# Patient Record
Sex: FEMALE | Race: BLACK OR AFRICAN AMERICAN | Employment: OTHER | URBAN - METROPOLITAN AREA
[De-identification: names, ages, dates, MRNs, and addresses within clinical notes are randomized per-mention and may not be internally consistent; named-entity substitution may affect disease eponyms.]

---

## 2017-02-22 ENCOUNTER — HOSPITAL ENCOUNTER (EMERGENCY)
Age: 72
Discharge: HOME | End: 2017-02-22
Attending: EMERGENCY MEDICINE
Payer: MEDICARE

## 2017-02-22 ENCOUNTER — APPOINTMENT (OUTPATIENT)
Dept: CT IMAGING | Age: 72
End: 2017-02-22
Attending: EMERGENCY MEDICINE
Payer: MEDICARE

## 2017-02-22 VITALS
HEIGHT: 60 IN | SYSTOLIC BLOOD PRESSURE: 145 MMHG | TEMPERATURE: 98.3 F | HEART RATE: 72 BPM | WEIGHT: 130 LBS | RESPIRATION RATE: 16 BRPM | OXYGEN SATURATION: 99 % | DIASTOLIC BLOOD PRESSURE: 85 MMHG | BODY MASS INDEX: 25.52 KG/M2

## 2017-02-22 DIAGNOSIS — R56.9 SEIZURE (HCC): Primary | ICD-10-CM

## 2017-02-22 LAB
ALBUMIN SERPL BCP-MCNC: 3.8 G/DL (ref 3.5–5)
ALBUMIN/GLOB SERPL: 0.8 {RATIO} (ref 1.1–2.2)
ALP SERPL-CCNC: 94 U/L (ref 45–117)
ALT SERPL-CCNC: 33 U/L (ref 12–78)
ANION GAP BLD CALC-SCNC: 9 MMOL/L (ref 5–15)
APPEARANCE UR: CLEAR
AST SERPL W P-5'-P-CCNC: 22 U/L (ref 15–37)
ATRIAL RATE: 101 BPM
BACTERIA URNS QL MICRO: NEGATIVE /HPF
BASOPHILS # BLD AUTO: 0 K/UL (ref 0–0.1)
BASOPHILS # BLD: 0 % (ref 0–1)
BILIRUB SERPL-MCNC: 0.8 MG/DL (ref 0.2–1)
BILIRUB UR QL: NEGATIVE
BUN SERPL-MCNC: 17 MG/DL (ref 6–20)
BUN/CREAT SERPL: 20 (ref 12–20)
CALCIUM SERPL-MCNC: 9.5 MG/DL (ref 8.5–10.1)
CALCULATED P AXIS, ECG09: 38 DEGREES
CALCULATED R AXIS, ECG10: -7 DEGREES
CALCULATED T AXIS, ECG11: 33 DEGREES
CHLORIDE SERPL-SCNC: 105 MMOL/L (ref 97–108)
CO2 SERPL-SCNC: 26 MMOL/L (ref 21–32)
COLOR UR: ABNORMAL
CREAT SERPL-MCNC: 0.84 MG/DL (ref 0.55–1.02)
DIAGNOSIS, 93000: NORMAL
EOSINOPHIL # BLD: 0 K/UL (ref 0–0.4)
EOSINOPHIL NFR BLD: 0 % (ref 0–7)
EPITH CASTS URNS QL MICRO: ABNORMAL /LPF
ERYTHROCYTE [DISTWIDTH] IN BLOOD BY AUTOMATED COUNT: 13.9 % (ref 11.5–14.5)
GLOBULIN SER CALC-MCNC: 4.6 G/DL (ref 2–4)
GLUCOSE SERPL-MCNC: 97 MG/DL (ref 65–100)
GLUCOSE UR STRIP.AUTO-MCNC: NEGATIVE MG/DL
HCT VFR BLD AUTO: 38.3 % (ref 35–47)
HGB BLD-MCNC: 12.5 G/DL (ref 11.5–16)
HGB UR QL STRIP: NEGATIVE
KETONES UR QL STRIP.AUTO: NEGATIVE MG/DL
LEUKOCYTE ESTERASE UR QL STRIP.AUTO: NEGATIVE
LYMPHOCYTES # BLD AUTO: 49 % (ref 12–49)
LYMPHOCYTES # BLD: 2.8 K/UL (ref 0.8–3.5)
MCH RBC QN AUTO: 27.5 PG (ref 26–34)
MCHC RBC AUTO-ENTMCNC: 32.6 G/DL (ref 30–36.5)
MCV RBC AUTO: 84.4 FL (ref 80–99)
MONOCYTES # BLD: 0.4 K/UL (ref 0–1)
MONOCYTES NFR BLD AUTO: 6 % (ref 5–13)
NEUTS SEG # BLD: 2.6 K/UL (ref 1.8–8)
NEUTS SEG NFR BLD AUTO: 45 % (ref 32–75)
NITRITE UR QL STRIP.AUTO: NEGATIVE
P-R INTERVAL, ECG05: 144 MS
PH UR STRIP: 6 [PH] (ref 5–8)
PLATELET # BLD AUTO: 184 K/UL (ref 150–400)
POTASSIUM SERPL-SCNC: 3.6 MMOL/L (ref 3.5–5.1)
PROT SERPL-MCNC: 8.4 G/DL (ref 6.4–8.2)
PROT UR STRIP-MCNC: ABNORMAL MG/DL
Q-T INTERVAL, ECG07: 344 MS
QRS DURATION, ECG06: 66 MS
QTC CALCULATION (BEZET), ECG08: 446 MS
RBC # BLD AUTO: 4.54 M/UL (ref 3.8–5.2)
RBC #/AREA URNS HPF: ABNORMAL /HPF (ref 0–5)
SODIUM SERPL-SCNC: 140 MMOL/L (ref 136–145)
SP GR UR REFRACTOMETRY: 1.02 (ref 1–1.03)
UA: UC IF INDICATED,UAUC: ABNORMAL
UROBILINOGEN UR QL STRIP.AUTO: 1 EU/DL (ref 0.2–1)
VENTRICULAR RATE, ECG03: 101 BPM
WBC # BLD AUTO: 5.8 K/UL (ref 3.6–11)
WBC URNS QL MICRO: ABNORMAL /HPF (ref 0–4)

## 2017-02-22 PROCEDURE — 93005 ELECTROCARDIOGRAM TRACING: CPT

## 2017-02-22 PROCEDURE — 81001 URINALYSIS AUTO W/SCOPE: CPT | Performed by: EMERGENCY MEDICINE

## 2017-02-22 PROCEDURE — 74011250636 HC RX REV CODE- 250/636: Performed by: EMERGENCY MEDICINE

## 2017-02-22 PROCEDURE — 80177 DRUG SCRN QUAN LEVETIRACETAM: CPT | Performed by: EMERGENCY MEDICINE

## 2017-02-22 PROCEDURE — 36415 COLL VENOUS BLD VENIPUNCTURE: CPT | Performed by: EMERGENCY MEDICINE

## 2017-02-22 PROCEDURE — 85025 COMPLETE CBC W/AUTO DIFF WBC: CPT | Performed by: EMERGENCY MEDICINE

## 2017-02-22 PROCEDURE — 99285 EMERGENCY DEPT VISIT HI MDM: CPT

## 2017-02-22 PROCEDURE — 80053 COMPREHEN METABOLIC PANEL: CPT | Performed by: EMERGENCY MEDICINE

## 2017-02-22 PROCEDURE — 96365 THER/PROPH/DIAG IV INF INIT: CPT

## 2017-02-22 PROCEDURE — 74011000258 HC RX REV CODE- 258: Performed by: EMERGENCY MEDICINE

## 2017-02-22 PROCEDURE — 70450 CT HEAD/BRAIN W/O DYE: CPT

## 2017-02-22 RX ORDER — ACETAMINOPHEN 160 MG/5ML
640 LIQUID ORAL
COMMUNITY
End: 2020-02-04

## 2017-02-22 RX ORDER — DOCUSATE SODIUM 50 MG/5ML
100 LIQUID ORAL 2 TIMES DAILY
COMMUNITY
End: 2020-01-23

## 2017-02-22 RX ORDER — LORATADINE 10 MG/1
10 TABLET ORAL
COMMUNITY

## 2017-02-22 RX ORDER — ASPIRIN 325 MG
2 TABLET, DELAYED RELEASE (ENTERIC COATED) ORAL
COMMUNITY
End: 2020-01-23

## 2017-02-22 RX ORDER — AMLODIPINE BESYLATE 2.5 MG/1
5 TABLET ORAL DAILY
COMMUNITY
End: 2020-02-04

## 2017-02-22 RX ORDER — LEVETIRACETAM 500 MG/1
1000 TABLET, EXTENDED RELEASE ORAL DAILY
COMMUNITY
End: 2020-01-23

## 2017-02-22 RX ADMIN — SODIUM CHLORIDE 1000 MG: 900 INJECTION, SOLUTION INTRAVENOUS at 09:38

## 2017-02-22 RX ADMIN — SODIUM CHLORIDE 1000 ML: 900 INJECTION, SOLUTION INTRAVENOUS at 09:38

## 2017-02-22 NOTE — ED NOTES
Discharge instructions given to [facility] representative. V/S stable @ time of discharge. Pt wheeled out of unit by [facility] representative.

## 2017-02-22 NOTE — ED TRIAGE NOTES
Pt from [facility] as respit pt. Pt has known epilepsy, had a grand mal seizure ~ 5 minutes, pt was getting dressed for the day. Pt was lying in the bed, did not fall, no evidence of aspiration. Pt has \"profound intellectual disability\" at baseline, can only speak a few words, significant gait imbalances and is wheelchair bound. .

## 2017-02-22 NOTE — PROGRESS NOTES
Noted TRST score. EMR reviewed. History significant for seizures, HTN, and neurological disorder. Patient presents [facility] s[facility]p seizure. Patient is w/ [facility] and placed for respite stay at facility. Noted contact name/number for [facility]. CM attempted to assess patient however nonverbal.     Case discussed w/ Pedro Orts. Informed [place] to f/u w/ [facility]. CM placed call to SW - message left 1115 regarding ETA. Call received from [place], [address] informed of ETA by 1200. Noted 1215 alternative call placed back to [facility] regarding status of ride. Informed patient was to discharge from [place] today and go home -  may have stopped at facility to  belongings before patient. Contact made w/ ED Registration and  on site [id number]. Updates provided aKvitha Ta - informed [facility] is all inclusive care and would provide facilitation back into community w/ family. Care Management Interventions  Mode of Transport at Discharge: [address]  [address] Time of Discharge: [address] (CM Consult):  Other (TRST)  MyChart Signup: No  Discharge Durable Medical Equipment: No  Physical Therapy Consult: No  Occupational Therapy Consult: No  Speech Therapy Consult: No  Current Support Network: Nursing Facility (respite stay at [facility])  Confirm Follow Up Transport: Other (see comment) ([facility] transport)  Plan discussed with Pt/Family/Caregiver: No  Discharge Location  Discharge Placement: Home

## 2017-02-22 NOTE — DISCHARGE INSTRUCTIONS
We hope that we have addressed all of your medical concerns. The examination and treatment you received in the Emergency Department were for an emergent problem and were not intended as complete care. It is important that you follow up with your healthcare provider(s) for ongoing care. If your symptoms worsen or do not improve as expected, and you are unable to reach your usual health care provider(s), you should return to the Emergency Department. Today's healthcare is undergoing tremendous change, and patient satisfaction surveys are one of the many tools to assess the quality of medical care. You may receive a survey from the [employer] regarding your experience in the Emergency Department. I hope that your experience has been completely positive, particularly the medical care that I provided. As such, please participate in the survey; anything less than excellent does not meet my expectations or intentions. [facility] and [address] [place] [address] participate in nationally recognized quality of care measures. If your blood pressure is greater than 120/80, as reported below, we urge that you seek medical care to address the potential of high blood pressure, commonly known as hypertension. Hypertension can be hereditary or can be caused by certain medical conditions, pain, stress, or \"white coat syndrome. \"       Please make an appointment with your health care provider(s) for follow up of your Emergency Department visit. VITALS:   Patient Vitals for the past 8 hrs:   Temp Pulse Resp BP SpO2   02/22/17 1000 - 73 17 139/89 99 %   02/22/17 0903 98.2 °F (36.8 °C) (!) 102 16 130/82 98 %          Thank you for allowing us to provide you with medical care today. We realize that you have many choices for your emergency care needs. Please choose us in the future for any continued health care needs.       Regards,           Kevin Valle, MD    [address].   Office: [phone]            Recent Results (from the past 24 hour(s))   CBC WITH AUTOMATED DIFF    Collection Time: 02/22/17  9:10 AM   Result Value Ref Range    WBC 5.8 3.6 - 11.0 K/uL    RBC 4.54 3.80 - 5.20 M/uL    HGB 12.5 11.5 - 16.0 g/dL    HCT 38.3 35.0 - 47.0 %    MCV 84.4 80.0 - 99.0 FL    MCH 27.5 26.0 - 34.0 PG    MCHC 32.6 30.0 - 36.5 g/dL    RDW 13.9 11.5 - 14.5 %    PLATELET 837 928 - 018 K/uL    NEUTROPHILS 45 32 - 75 %    LYMPHOCYTES 49 12 - 49 %    MONOCYTES 6 5 - 13 %    EOSINOPHILS 0 0 - 7 %    BASOPHILS 0 0 - 1 %    ABS. NEUTROPHILS 2.6 1.8 - 8.0 K/UL    ABS. LYMPHOCYTES 2.8 0.8 - 3.5 K/UL    ABS. MONOCYTES 0.4 0.0 - 1.0 K/UL    ABS. EOSINOPHILS 0.0 0.0 - 0.4 K/UL    ABS. BASOPHILS 0.0 0.0 - 0.1 K/UL   METABOLIC PANEL, COMPREHENSIVE    Collection Time: 02/22/17  9:10 AM   Result Value Ref Range    Sodium 140 136 - 145 mmol/L    Potassium 3.6 3.5 - 5.1 mmol/L    Chloride 105 97 - 108 mmol/L    CO2 26 21 - 32 mmol/L    Anion gap 9 5 - 15 mmol/L    Glucose 97 65 - 100 mg/dL    BUN 17 6 - 20 MG/DL    Creatinine 0.84 0.55 - 1.02 MG/DL    BUN/Creatinine ratio 20 12 - 20      GFR est AA >60 >60 ml/min/1.73m2    GFR est non-AA >60 >60 ml/min/1.73m2    Calcium 9.5 8.5 - 10.1 MG/DL    Bilirubin, total 0.8 0.2 - 1.0 MG/DL    ALT (SGPT) 33 12 - 78 U/L    AST (SGOT) 22 15 - 37 U/L    Alk.  phosphatase 94 45 - 117 U/L    Protein, total 8.4 (H) 6.4 - 8.2 g/dL    Albumin 3.8 3.5 - 5.0 g/dL    Globulin 4.6 (H) 2.0 - 4.0 g/dL    A-G Ratio 0.8 (L) 1.1 - 2.2     URINALYSIS W/ REFLEX CULTURE    Collection Time: 02/22/17  9:20 AM   Result Value Ref Range    Color YELLOW/STRAW      Appearance CLEAR CLEAR      Specific gravity 1.024 1.003 - 1.030      pH (UA) 6.0 5.0 - 8.0      Protein TRACE (A) NEG mg/dL    Glucose NEGATIVE  NEG mg/dL    Ketone NEGATIVE  NEG mg/dL    Bilirubin NEGATIVE  NEG      Blood NEGATIVE  NEG      Urobilinogen 1.0 0.2 - 1.0 EU/dL    Nitrites NEGATIVE NEG      Leukocyte Esterase NEGATIVE  NEG      WBC 0-4 0 - 4 /hpf    RBC 0-5 0 - 5 /hpf    Epithelial cells MODERATE (A) FEW /lpf    Bacteria NEGATIVE  NEG /hpf    UA:UC IF INDICATED CULTURE NOT INDICATED BY UA RESULT CNI         Ct Head Wo Cont    Result Date: 2/22/2017  HEAD CT WITHOUT CONTRAST: 2/22/2017 9:33 AM INDICATION: seizure pt unable to answer questions. HISTORY (per electronic medical record): Received from skilled nursing facility after a grand mal seizure lasting 5 minutes. No fall; lying in bed. Patient has known epilepsy. Profound intellectual disability at baseline, only able to speak a few words. Wheelchair-bound. COMPARISON: None. PROCEDURE: Axial images of the head were obtained without contrast. Coronal and sagittal reformats were performed. CT dose reduction was achieved through use of a standardized protocol tailored for this examination and automatic exposure control for dose modulation. Adaptive statistical iterative reconstruction (ASIR) was utilized. FINDINGS: The ventricles and sulci are appropriate in size and configuration for age. No loss of gray-white differentiation to suggest late acute or early subacute infarction. No mass effect or intracranial hemorrhage. IMPRESSION: No acute intracranial abnormality. Seizure: Care Instructions  Your Care Instructions    Seizures are caused by abnormal patterns of electrical signals in the brain. They are different for each person. Seizures can affect movement, speech, vision, or awareness. Some people have only slight shaking of a hand and do not pass out. Other people may pass out and have violent shaking of the whole body. Some people appear to stare into space. They are awake, but they can't respond normally. Later, they may not remember what happened. You may need tests to identify the type and cause of the seizures. A seizure may occur only once, or you may have them more than one time.  Taking medicines as directed and following up with your doctor may help keep you from having more seizures. The doctor has checked you carefully, but problems can develop later. If you notice any problems or new symptoms, get medical treatment right away. Follow-up care is a key part of your treatment and safety. Be sure to make and go to all appointments, and call your doctor if you are having problems. It's also a good idea to know your test results and keep a list of the medicines you take. How can you care for yourself at home? · Be safe with medicines. Take your medicines exactly as prescribed. Call your doctor if you think you are having a problem with your medicine. · Do not do any activity that could be dangerous to you or others until your doctor says it is safe to do so. For example, do not drive a car, operate machinery, swim, or climb ladders. · Be sure that anyone treating you for any health problem knows that you have had a seizure and what medicines you are taking for it. · Identify and avoid things that may make you more likely to have a seizure. These may include lack of sleep, alcohol or drug use, stress, or not eating. · Make sure you go to your follow-up appointment. When should you call for help? Call 911 anytime you think you may need emergency care. For example, call if:  · You have another seizure. · You have more than one seizure in 24 hours. · You have new symptoms, such as trouble walking, speaking, or thinking clearly. Call your doctor now or seek immediate medical care if:  · You are not acting normally. Watch closely for changes in your health, and be sure to contact your doctor if you have any problems. Where can you learn more? Go to http://maryam-heber.info/. Enter S798 in the search box to learn more about \"Seizure: Care Instructions. \"  Current as of: February 19, 2016  Content Version: 11.1  © 3738-8096 [employer].  Care instructions adapted under license by [employer] (which disclaims liability or warranty for this information). If you have questions about a medical condition or this instruction, always ask your healthcare professional. [address] any warranty or liability for your use of this information.

## 2017-02-22 NOTE — ED NOTES
Pt's facility called to inform that pt will be returning, staff reports that pt is to be discharged today. Staff states that they will call this RN back, waiting for call.      11:04 AM  Rhonda Andrew with [facility] to pick pt up. [phone]

## 2017-02-22 NOTE — ED NOTES
Bedside report received from [facility]. All questions answered. Pt resting comfortably. V/S stable, and no distress noted. Call bell within reach.  Pt awaiting [facility] to transport back to facility

## 2017-02-22 NOTE — ED PROVIDER NOTES
HPI Comments: 73 y.o. female with past medical history significant for HTN, neurological disorder, seizures, epilepsy, UTI, vitamin D deficiency, and gait instability who presents via EMS with chief complaint of seizure. Per EMS, pt presents from [facility] as a respite pt where the pt had a seizure for approximately 5 minutes while laying in bed. EMS reports that pt did not fall and did not hit their head. EMS reports that pt has intellectual disability and is wheelchair bound due to gait imblanaces. Pt doesn't have teeth and eats a soft diet. Pt takes keppra, amlodipine, and claritin. EMS unsure if pt had her morning medications  There are no other acute medical concerns at this time. PCP: None    Note written by Malini Bryson, as dictated by Juliocesar Gates MD 10:33 AM    Full history, physical exam, and ROS unable to be obtained due to: patient nonverbal      The history is provided by the EMS personnel. The history is limited by the condition of the patient. No  was used. Past Medical History:   Diagnosis Date    Epilepsy ([address]     Gait instability     Hypertension     Neurological disorder     Seizures (HCC)     UTI (urinary tract infection)     Vitamin D deficiency        History reviewed. No pertinent surgical history. History reviewed. No pertinent family history. Social History     Social History    Marital status: UNKNOWN     Spouse name: N/A    Number of children: N/A    Years of education: N/A     Occupational History    Not on file. Social History Main Topics    Smoking status: Unknown If Ever Smoked    Smokeless tobacco: Not on file    Alcohol use No    Drug use: Not on file    Sexual activity: Not on file     Other Topics Concern    Not on file     Social History Narrative    No narrative on file         ALLERGIES: Review of patient's allergies indicates no known allergies.     Review of Systems   Unable to perform ROS: Patient nonverbal       Vitals:    02/22/17 0903   BP: 130/82   Pulse: (!) 102   Resp: 16   Temp: 98.2 °F (36.8 °C)   SpO2: 98%   Weight: 59 kg (130 lb)   Height: 5' (1.524 m)            Physical Exam   Constitutional: She appears well-developed and well-nourished. No distress. HENT:   Head: Normocephalic and atraumatic. Eyes: Conjunctivae and EOM are normal. Pupils are equal, round, and reactive to light. Neck: Normal range of motion. Neck supple. Cardiovascular: Regular rhythm, normal heart sounds and intact distal pulses. Exam reveals no friction rub. No murmur heard. tachycardic   Pulmonary/Chest: Effort normal and breath sounds normal. No respiratory distress. She has no wheezes. She has no rales. She exhibits no tenderness. Abdominal: Soft. Bowel sounds are normal. She exhibits no distension. There is no tenderness. There is no rebound and no guarding. Musculoskeletal: Normal range of motion. She exhibits no edema or tenderness. Neurological: She is alert. No cranial nerve deficit. She exhibits normal muscle tone. Coordination normal.   Contracted; nonverbal   Skin: Skin is warm and dry. She is not diaphoretic. No pallor. Psychiatric: She has a normal mood and affect. Her behavior is normal.   Nursing note and vitals reviewed. MDM  Number of Diagnoses or Management Options  Diagnosis management comments: Seizure did not get meds today. Will check keppra level but wont come back now  .will load here. Check for infectious etiology as well though sounds like pt has hx of seizures and may just be break through.  Given unable to give hx ct head as could have had a fall and subdural       Amount and/or Complexity of Data Reviewed  Clinical lab tests: ordered and reviewed  Tests in the radiology section of CPT®: ordered and reviewed  Independent visualization of images, tracings, or specimens: yes (ekg)    Patient Progress  Patient progress: stable    ED Course       Procedures  EKG interpretation: (Preliminary)  Rhythm: sinus tachycardia; and regular . Rate (approx.): 110; Axis: normal; P wave: normal; QRS interval: normal ; ST/T wave: non-specific changes;    PROGRESS NOTE:  10:42 AM  Attempted to call cell phone of Pt's sister, person unreachable and no way to leave a voicemail on the number given. 11:00 AM  Pt with no seizures here. Dc back to nursing facility and follow up with neurology. Will give our neurology number as do not know who pt sees and she is nonverbal or she can see her neurologist. keppra level pending.

## 2017-02-22 NOTE — PROGRESS NOTES
Admission Medication Reconciliation:    Information obtained from: [facility]    Chief Complaint for this Admission:  Seizure    Allergies:  Review of patient's allergies indicates no known allergies. Comments/Recommendations: Reviewed SNR MAR for PTA medications  1) Added Acetaminophen, Levetiracetam, Docusate, Amlodipine, Ensure, Cholecalciferol, Claritin  2) Patient was unable to provide further history due to her current state   3) Nurse at [facility] reported that she has taken all of her medication yesterday    Prior to Admission Medications   Prescriptions Last Dose Informant Patient Reported? Taking? Cholecalciferol, Vitamin D3, 50,000 unit cap 2/21/2017  Yes Yes   Sig: Take 2 Caps by mouth. First day of each month   LACTOSE-REDUCED FOOD (ENSURE PLUS PO) 2/21/2017  Yes Yes   Sig: Take  by mouth. Drink 1 can two times a day   acetaminophen (TYLENOL) 160 mg/5 mL liquid 2/21/2017  Yes Yes   Sig: Take 640 mg by mouth three (3) times daily as needed for Fever or Pain. amLODIPine (NORVASC) 2.5 mg tablet 2/21/2017  Yes Yes   Sig: Take 2.5 mg by mouth daily. docusate (COLACE) 50 mg/5 mL liquid 2/21/2017  Yes Yes   Sig: Take 100 mg by mouth two (2) times a day. levETIRAcetam (KEPPRA XR) 500 mg ER tablet 2/21/2017  Yes Yes   Sig: Take 1,000 mg by mouth daily. loratadine (CLARITIN) 10 mg tablet 2/21/2017  Yes Yes   Sig: Take 10 mg by mouth daily as needed (Congestion, cough).       Facility-Administered Medications: None     Ramirez Faria  PharmD Candidate 2017  SMI[facility]

## 2017-02-23 LAB — LEVETIRACETAM SERPL-MCNC: 0.4 UG/ML (ref 10–40)

## 2020-01-23 ENCOUNTER — HOSPITAL ENCOUNTER (INPATIENT)
Age: 75
LOS: 12 days | Discharge: NURSING, REHAB OR LONG-TERM CARE | DRG: 470 | End: 2020-02-04
Attending: EMERGENCY MEDICINE | Admitting: INTERNAL MEDICINE
Payer: MEDICARE

## 2020-01-23 ENCOUNTER — APPOINTMENT (OUTPATIENT)
Dept: GENERAL RADIOLOGY | Age: 75
DRG: 470 | End: 2020-01-23
Attending: EMERGENCY MEDICINE
Payer: MEDICARE

## 2020-01-23 DIAGNOSIS — S72.001A CLOSED FRACTURE OF RIGHT HIP, INITIAL ENCOUNTER (HCC): Primary | ICD-10-CM

## 2020-01-23 PROBLEM — S72.009A HIP FRACTURE (HCC): Status: ACTIVE | Noted: 2020-01-23

## 2020-01-23 LAB
ABO + RH BLD: NORMAL
ALBUMIN SERPL-MCNC: 3.5 G/DL (ref 3.5–5)
ALBUMIN/GLOB SERPL: 0.7 {RATIO} (ref 1.1–2.2)
ALP SERPL-CCNC: 73 U/L (ref 45–117)
ALT SERPL-CCNC: 25 U/L (ref 12–78)
ANION GAP SERPL CALC-SCNC: 7 MMOL/L (ref 5–15)
APPEARANCE UR: CLEAR
APTT PPP: 26.2 SEC (ref 22.1–32)
AST SERPL-CCNC: 28 U/L (ref 15–37)
ATRIAL RATE: 95 BPM
BACTERIA URNS QL MICRO: ABNORMAL /HPF
BASOPHILS # BLD: 0 K/UL (ref 0–0.1)
BASOPHILS NFR BLD: 0 % (ref 0–1)
BILIRUB SERPL-MCNC: 1 MG/DL (ref 0.2–1)
BILIRUB UR QL: NEGATIVE
BLOOD GROUP ANTIBODIES SERPL: NORMAL
BUN SERPL-MCNC: 16 MG/DL (ref 6–20)
BUN/CREAT SERPL: 23 (ref 12–20)
CALCIUM SERPL-MCNC: 9.5 MG/DL (ref 8.5–10.1)
CALCULATED P AXIS, ECG09: 101 DEGREES
CALCULATED R AXIS, ECG10: -7 DEGREES
CALCULATED T AXIS, ECG11: -33 DEGREES
CHLORIDE SERPL-SCNC: 106 MMOL/L (ref 97–108)
CO2 SERPL-SCNC: 27 MMOL/L (ref 21–32)
COLOR UR: ABNORMAL
COMMENT, HOLDF: NORMAL
COMMENT, HOLDF: NORMAL
CREAT SERPL-MCNC: 0.71 MG/DL (ref 0.55–1.02)
DIAGNOSIS, 93000: NORMAL
DIFFERENTIAL METHOD BLD: NORMAL
EOSINOPHIL # BLD: 0.1 K/UL (ref 0–0.4)
EOSINOPHIL NFR BLD: 1 % (ref 0–7)
EPITH CASTS URNS QL MICRO: ABNORMAL /LPF
ERYTHROCYTE [DISTWIDTH] IN BLOOD BY AUTOMATED COUNT: 13.2 % (ref 11.5–14.5)
GLOBULIN SER CALC-MCNC: 4.9 G/DL (ref 2–4)
GLUCOSE SERPL-MCNC: 104 MG/DL (ref 65–100)
GLUCOSE UR STRIP.AUTO-MCNC: NEGATIVE MG/DL
HCT VFR BLD AUTO: 38.7 % (ref 35–47)
HGB BLD-MCNC: 11.9 G/DL (ref 11.5–16)
HGB UR QL STRIP: NEGATIVE
IMM GRANULOCYTES # BLD AUTO: 0 K/UL (ref 0–0.04)
IMM GRANULOCYTES NFR BLD AUTO: 0 % (ref 0–0.5)
INR PPP: 1 (ref 0.9–1.1)
KETONES UR QL STRIP.AUTO: NEGATIVE MG/DL
LEUKOCYTE ESTERASE UR QL STRIP.AUTO: ABNORMAL
LYMPHOCYTES # BLD: 2 K/UL (ref 0.8–3.5)
LYMPHOCYTES NFR BLD: 32 % (ref 12–49)
MCH RBC QN AUTO: 27.2 PG (ref 26–34)
MCHC RBC AUTO-ENTMCNC: 30.7 G/DL (ref 30–36.5)
MCV RBC AUTO: 88.4 FL (ref 80–99)
MONOCYTES # BLD: 0.6 K/UL (ref 0–1)
MONOCYTES NFR BLD: 9 % (ref 5–13)
NEUTS SEG # BLD: 3.4 K/UL (ref 1.8–8)
NEUTS SEG NFR BLD: 58 % (ref 32–75)
NITRITE UR QL STRIP.AUTO: NEGATIVE
NRBC # BLD: 0 K/UL (ref 0–0.01)
NRBC BLD-RTO: 0 PER 100 WBC
P-R INTERVAL, ECG05: 142 MS
PH UR STRIP: 5.5 [PH] (ref 5–8)
PLATELET # BLD AUTO: 150 K/UL (ref 150–400)
PMV BLD AUTO: 9.9 FL (ref 8.9–12.9)
POTASSIUM SERPL-SCNC: 3.6 MMOL/L (ref 3.5–5.1)
PROT SERPL-MCNC: 8.4 G/DL (ref 6.4–8.2)
PROT UR STRIP-MCNC: ABNORMAL MG/DL
PROTHROMBIN TIME: 10.3 SEC (ref 9–11.1)
Q-T INTERVAL, ECG07: 328 MS
QRS DURATION, ECG06: 50 MS
QTC CALCULATION (BEZET), ECG08: 412 MS
RBC # BLD AUTO: 4.38 M/UL (ref 3.8–5.2)
RBC #/AREA URNS HPF: ABNORMAL /HPF (ref 0–5)
SAMPLES BEING HELD,HOLD: NORMAL
SAMPLES BEING HELD,HOLD: NORMAL
SODIUM SERPL-SCNC: 140 MMOL/L (ref 136–145)
SP GR UR REFRACTOMETRY: 1.03 (ref 1–1.03)
SPECIMEN EXP DATE BLD: NORMAL
THERAPEUTIC RANGE,PTTT: NORMAL SECS (ref 58–77)
UR CULT HOLD, URHOLD: NORMAL
UROBILINOGEN UR QL STRIP.AUTO: 2 EU/DL (ref 0.2–1)
VENTRICULAR RATE, ECG03: 95 BPM
WBC # BLD AUTO: 6 K/UL (ref 3.6–11)
WBC URNS QL MICRO: ABNORMAL /HPF (ref 0–4)

## 2020-01-23 PROCEDURE — 74011250636 HC RX REV CODE- 250/636: Performed by: INTERNAL MEDICINE

## 2020-01-23 PROCEDURE — 85025 COMPLETE CBC W/AUTO DIFF WBC: CPT

## 2020-01-23 PROCEDURE — 71045 X-RAY EXAM CHEST 1 VIEW: CPT

## 2020-01-23 PROCEDURE — 81001 URINALYSIS AUTO W/SCOPE: CPT

## 2020-01-23 PROCEDURE — 74011250637 HC RX REV CODE- 250/637: Performed by: INTERNAL MEDICINE

## 2020-01-23 PROCEDURE — 80053 COMPREHEN METABOLIC PANEL: CPT

## 2020-01-23 PROCEDURE — 77030038269 HC DRN EXT URIN PURWCK BARD -A

## 2020-01-23 PROCEDURE — 65270000029 HC RM PRIVATE

## 2020-01-23 PROCEDURE — 36415 COLL VENOUS BLD VENIPUNCTURE: CPT

## 2020-01-23 PROCEDURE — 93005 ELECTROCARDIOGRAM TRACING: CPT

## 2020-01-23 PROCEDURE — 99285 EMERGENCY DEPT VISIT HI MDM: CPT

## 2020-01-23 PROCEDURE — 85730 THROMBOPLASTIN TIME PARTIAL: CPT

## 2020-01-23 PROCEDURE — 85610 PROTHROMBIN TIME: CPT

## 2020-01-23 PROCEDURE — 73502 X-RAY EXAM HIP UNI 2-3 VIEWS: CPT

## 2020-01-23 PROCEDURE — 86900 BLOOD TYPING SEROLOGIC ABO: CPT

## 2020-01-23 RX ORDER — FACIAL-BODY WIPES
10 EACH TOPICAL
COMMUNITY

## 2020-01-23 RX ORDER — MORPHINE SULFATE 2 MG/ML
1 INJECTION, SOLUTION INTRAMUSCULAR; INTRAVENOUS
Status: DISCONTINUED | OUTPATIENT
Start: 2020-01-23 | End: 2020-01-24

## 2020-01-23 RX ORDER — ONDANSETRON 2 MG/ML
4 INJECTION INTRAMUSCULAR; INTRAVENOUS
Status: DISCONTINUED | OUTPATIENT
Start: 2020-01-23 | End: 2020-01-24

## 2020-01-23 RX ORDER — SODIUM CHLORIDE 0.9 % (FLUSH) 0.9 %
5-40 SYRINGE (ML) INJECTION AS NEEDED
Status: DISCONTINUED | OUTPATIENT
Start: 2020-01-23 | End: 2020-01-24

## 2020-01-23 RX ORDER — SODIUM CHLORIDE 0.9 % (FLUSH) 0.9 %
5-40 SYRINGE (ML) INJECTION EVERY 8 HOURS
Status: DISCONTINUED | OUTPATIENT
Start: 2020-01-23 | End: 2020-01-24

## 2020-01-23 RX ORDER — ACETAMINOPHEN 325 MG/1
650 TABLET ORAL
Status: DISCONTINUED | OUTPATIENT
Start: 2020-01-23 | End: 2020-01-24

## 2020-01-23 RX ORDER — DOCUSATE SODIUM 100 MG/1
100 CAPSULE, LIQUID FILLED ORAL DAILY
COMMUNITY

## 2020-01-23 RX ORDER — LEVETIRACETAM 750 MG/1
1500 TABLET, EXTENDED RELEASE ORAL DAILY
COMMUNITY
End: 2020-02-04

## 2020-01-23 RX ORDER — NALOXONE HYDROCHLORIDE 0.4 MG/ML
0.4 INJECTION, SOLUTION INTRAMUSCULAR; INTRAVENOUS; SUBCUTANEOUS AS NEEDED
Status: DISCONTINUED | OUTPATIENT
Start: 2020-01-23 | End: 2020-01-24

## 2020-01-23 RX ORDER — LEVETIRACETAM 500 MG/1
1000 TABLET ORAL DAILY
Status: DISCONTINUED | OUTPATIENT
Start: 2020-01-23 | End: 2020-01-24

## 2020-01-23 RX ADMIN — MORPHINE SULFATE 1 MG: 2 INJECTION, SOLUTION INTRAMUSCULAR; INTRAVENOUS at 20:52

## 2020-01-23 NOTE — ED PROVIDER NOTES
69 y.o. female with past medical history significant for HTN, Epilepsy, and cerebral Palsy who presents from [facility] via EMS with chief complaint of hip pain. Patient is a poor historian, non-verbal at baseline. Per EMS, patient's sister noticed that \"she had difficulty ambulating noting patient was experiencing pain to her right hip. \" Unknown if patient had recent GLF. Patient received an X-Ray at [facility] showing possible right hip fracture. Patient presents to [facility] ED for evaluation of right hip pain and possible right hip fracture. There are no other acute medical concerns at this time. Full history, physical exam, and ROS unable to be obtained due to:  Non-verbal at baseline. Social hx: No tobacco use, No EtOH use, No illicit drug use. Note written by Malini Bush, as dictated by Liborio Owusu MD 2:10 PM     The history is provided by the EMS personnel. No  was used. Past Medical History:   Diagnosis Date    Epilepsy ([facility]     Gait instability     Hypertension     Neurological disorder     Seizures (HCC)     UTI (urinary tract infection)     Vitamin D deficiency        No past surgical history on file. No family history on file.     Social History     Socioeconomic History    Marital status: UNKNOWN     Spouse name: Not on file    Number of children: Not on file    Years of education: Not on file    Highest education level: Not on file   Occupational History    Not on file   Social Needs    Financial resource strain: Not on file    Food insecurity:     Worry: Not on file     Inability: Not on file    Transportation needs:     Medical: Not on file     Non-medical: Not on file   Tobacco Use    Smoking status: Unknown If Ever Smoked   Substance and Sexual Activity    Alcohol use: No    Drug use: Not on file    Sexual activity: Not on file   Lifestyle    Physical activity:     Days per week: Not on file     Minutes per session: Not on file    Stress: Not on file   Relationships    Social connections:     Talks on phone: Not on file     Gets together: Not on file     Attends Sikhism service: Not on file     Active member of club or organization: Not on file     Attends meetings of clubs or organizations: Not on file     Relationship status: Not on file    Intimate partner violence:     Fear of current or ex partner: Not on file     Emotionally abused: Not on file     Physically abused: Not on file     Forced sexual activity: Not on file   Other Topics Concern    Not on file   Social History Narrative    Not on file         ALLERGIES: Patient has no known allergies. Review of Systems   Unable to perform ROS: Patient nonverbal       Vitals:    01/23/20 1405   BP: (!) 129/102   Pulse: 86   Resp: 16   Temp: 99.9 °F (37.7 °C)   SpO2: 97%            Physical Exam  Vitals signs and nursing note reviewed. Constitutional:       General: She is not in acute distress. Appearance: She is well-developed. HENT:      Head: Normocephalic and atraumatic. Nose: Nose normal.   Eyes:      General: No scleral icterus. Conjunctiva/sclera: Conjunctivae normal.      Pupils: Pupils are equal, round, and reactive to light. Neck:      Musculoskeletal: Normal range of motion and neck supple. Thyroid: No thyromegaly. Vascular: No JVD. Trachea: No tracheal deviation. Comments: No carotid bruits noted. Cardiovascular:      Rate and Rhythm: Normal rate and regular rhythm. Heart sounds: Normal heart sounds. No murmur. No friction rub. No gallop. Pulmonary:      Effort: Pulmonary effort is normal. No respiratory distress. Breath sounds: Normal breath sounds. No wheezing or rales. Chest:      Chest wall: No tenderness. Abdominal:      General: Bowel sounds are normal. There is no distension. Palpations: Abdomen is soft. There is no mass. Tenderness: There is no tenderness.  There is no guarding or rebound. Musculoskeletal: Normal range of motion. General: No tenderness. Comments: No edema to bilateral lower extremities. Lymphadenopathy:      Cervical: No cervical adenopathy. Skin:     General: Skin is warm and dry. Findings: No erythema or rash. Neurological:      Mental Status: She is alert. Cranial Nerves: No cranial nerve deficit. Coordination: Coordination normal.      Deep Tendon Reflexes: Reflexes are normal and symmetric. Comments: Moving arms. Noncommunicative. Doesn't respond to verbal commands. Psychiatric:         Behavior: Behavior normal.         Thought Content: Thought content normal.         Judgment: Judgment normal.     Note written by Hilda Nolan, as dictated by Sang Dover MD 2:10 PM      MDM  Number of Diagnoses or Management Options  Closed fracture of right hip, initial encounter [facility]): new and requires workup     Amount and/or Complexity of Data Reviewed  Clinical lab tests: ordered and reviewed  Tests in the radiology section of CPT®: ordered and reviewed  Decide to obtain previous medical records or to obtain history from someone other than the patient: yes  Review and summarize past medical records: yes  Discuss the patient with other providers: yes  Independent visualization of images, tracings, or specimens: yes    Risk of Complications, Morbidity, and/or Mortality  Presenting problems: high  Diagnostic procedures: high  Management options: high    Patient Progress  Patient progress: stable         Procedures  ED EKG interpretation:  Rhythm: sinus rhythm; and regular . Rate (approx.): 95 bpm; Poor tracing, baseline artifact, no ectopy. Note written by Malini Elizalde, as dictated by Sang Dover MD 2:10 PM     PROGRESS NOTE:  4:30 PM  Provider notes pt has right hip fracture on X-Ray. Pt is nonverbal so provider does not know if pt has hx of hip fracture, there is no hx of hip fracture in pt's chart.  Of note at baseline pt does ambulate with a walker at her nursing home. 4:37 PM  Provider sent picture of pt's X-Ray to Dr. Emili Last (per Dr. Clare Cao request). Hospitalist Benji Collins for Admission  4:39 PM    ED Room Number: ER05/05  Patient Name and age:  Vincent Bowles 69 y.o.  female  Working Diagnosis:   1. Closed fracture of right hip, initial encounter [facility])      Readmission: no  Isolation Requirements:  no  Recommended Level of Care:  med/surg  Code Status:  full  Other:    Department:[facility] Adult ED - 21     Patient is ambulatory in the nursing home with a walker. Consulting the hospitalist for admission. Photograph of the x-ray has been sent by perfect serve to Dr. Jackeline Beavers and to Pilo Mckee. PROGRESS NOTE:  5:21 PM  Provider is consulting Hospitalist for admission, Ortho is here and will likely do pt's hip replacement.

## 2020-01-23 NOTE — ED TRIAGE NOTES
Arrives via EMS from [facility] for c/o known right hip fracture on xray at facility. Unknown if recent fall. Two days ago sister noted difficulty walking with pain to right hip. Hx Cerebral Palsy, non-verbal. Normally ambulates short distances, wheelchair for long distances.

## 2020-01-23 NOTE — CONSULTS
Geriatric Fracture Consult  Patient: Rick Duenas  YOB: 1945   MRN: [id number]      Consult Date: 1/23/2020     Chief Complaint: Right hip pain  ED Presentation Time: < 8 hours  Mechanism of Injury: Unknown  Ambulatory Status: Per discussion with patient's sister, Juana Somers normally walks without assisted devices but is unstable. When at [place] during the day, she uses wheelchair  Past Medical History:   Past Medical History:   Diagnosis Date    Epilepsy [facility])     Gait instability     Hypertension     Neurological disorder     Se[facility] [address]     UTI (urinary tract infection)     Vitamin D deficiency        Allergies: No Known Allergies   Past Surgical History: No past surgical history on file.    Social History:   Social History     Socioeconomic History    Marital status: UNKNOWN     Spouse name: Not on file    Number of children: Not on file    Years of education: Not on file    Highest education level: Not on file   Occupational History    Not on file   Social Needs    Financial resource strain: Not on file    Food insecurity:     Worry: Not on file     Inability: Not on file    Transportation needs:     Medical: Not on file     Non-medical: Not on file   Tobacco Use    Smoking status: Unknown If Ever Smoked   Substance and Sexual Activity    Alcohol use: No    Drug use: Not on file    Sexual activity: Not on file   Lifestyle    Physical activity:     Days per week: Not on file     Minutes per session: Not on file    Stress: Not on file   Relationships    Social connections:     Talks on phone: Not on file     Gets together: Not on file     Attends Ep[employer] service: Not on file     Active member of club or organization: Not on file     Attends meetings of clubs or organizations: Not on file     Relationship status: Not on file    Intimate partner violence:     Fear of current or ex partner: Not on file     Emotionally abused: Not on file     Physically abused: Not on file     Forced sexual activity: Not on file   Other Topics Concern    Not on file   Social History Narrative    Not on file      Dwelling Status: Lives with her sister Merari Negro, [place])  Current Anticoagulant Medications: none  History of falls: unknown  Prior Fractures: unknown    Labs:    Lab Results   Component Value Date/Time    HGB 11.9 01/23/2020 04:31 PM    WBC 6.0 01/23/2020 04:31 PM    Albumin 3.5 01/23/2020 02:30 PM     X-RAYS:   XR HIP RT W OR WO PELV 2-3 VWS 1/23/2020 16:32  INDICATION: Trauma. Right hip pain. COMPARISON: None. FINDINGS: An AP view of the pelvis and a frogleg lateral view of the right hip  demonstrate a subcapital right proximal femur fracture with varus angulation. There is osteopenia. IMPRESSION: Subcapital right proximal femur fracture. Physical Exam:   General: 69yo female, mentally and physically disabled, non-verbal  CNS: non-verbal, somnolent, unable to assess    Vascular: pedal pulses normal and no edema   Skin: no obvious wounds on lower extremities  Extremities: laying on right side, right LE appears shortened, pain with right hip motion  Neurologic: unable to assess    Assessment: Subcapital right proximal femur fracture    Plan: This fracture is best treated surgically with partial hip replacement. I spoke via phone with her sister, Merari Negro [phone] who consents to treatment and will be here in the morning to sign surgical consent (Right hip hemiarthroplasty). Posted case with OR for 3:30 on 1/24/20. Will make her NPO after midnight.  Discussed with attending orthopedist, Dr. Darya Zamora: All contact information in chart is incorrect and needs to be updated  Emergency contact number [phone] is a wrong number  Home phone [phone] is a facility where she has not lived in several years       Signed by: ANITRA Rodrigez   Today's Date: January 23, 2020

## 2020-01-23 NOTE — H&P
History & Physical    Primary Care Provider: None  Source of Information: chart and NH     History of Presenting Illness:   Jassi Borjas is a 69 y.o. female who has a history of epilepsy and cerebral palsy and is presenting from a facility [facility] with hip pain. Patient is not able to give me history as she is nonverbal.  It was patient sister who told the EMS that the patient is complaining of pain and difficulty walking because of her the right hip nobody knows that if she had fallen. She was sent here by the nursing home because they did a right hip fracture after the complaint but the sister ended noticed that there was hip fracture on the right side. She was sent to [facility] for the same    Not able to rule out any other medical problems or any other symptoms as the patient is nonverbal       Review of Systems:  Nonverbal not able to obtain review of system    Past Medical History:   Diagnosis Date    Epilepsy ([facility] [address]     Gait instability     Hypertension     Neurological disorder     Seizures ([facility] [address]     UTI (urinary tract infection)     Vitamin D deficiency       No past surgical history on file. Prior to Admission medications    Medication Sig Start Date End Date Taking? Authorizing Provider   bisacodyL (DULCOLAX, BISACODYL,) 10 mg suppository Insert 10 mg into rectum daily as needed. Yes Provider, Historical   docusate sodium (COLACE) 100 mg capsule Take 100 mg by mouth daily. Yes Provider, Historical   levETIRAcetam (KEPPRA XR) 750 mg ER tablet Take 1,500 mg by mouth daily. Yes Provider, Historical   loratadine (CLARITIN) 10 mg tablet Take 10 mg by mouth daily as needed (Congestion, cough). Yes Provider, Historical   acetaminophen (TYLENOL) 160 mg/5 mL liquid Take 640 mg by mouth three (3) times daily as needed for Fever or Pain (Mild pain (PSR 1-3), low fever). Provider, Historical   amLODIPine (NORVASC) 2.5 mg tablet Take 5 mg by mouth daily. Provider, Historical     No Known Allergies   No family history on file. SOCIAL HISTORY:  Patient resides:  Independently    Assisted Living    SNF x   With family care       Smoking history:   None x   Former    Chronic      Alcohol history:   None x   Social    Chronic      Ambulates:   Independently    w/cane    w/walker    w/wc xx   CODE STATUS:  DNR    Full x   Other      Objective:     Physical Exam:     Visit Vitals  /72   Pulse 86   Temp 99.9 °F (37.7 °C)   Resp 16   SpO2 97%           General:  Alert, nonverbal does not respond to verbal commands. Is moving all extremities. Head:  Normocephalic, without obvious abnormality, atraumatic. Eyes:  Conjunctivae/corneas clear. PERRL, EOMs intact. Nose: Nares normal. Septum midline. Mucosa normal. No drainage or sinus tenderness. Throat: Lips, mucosa, and tongue normal. Teeth and gums normal.   Neck: Supple, symmetrical, trachea midline, no adenopathy, thyroid: no enlargement/tenderness/nodules, no carotid bruit and no JVD. Back:   Symmetric, no curvature. ROM normal. No CVA tenderness. Lungs:   Clear to auscultation bilaterally. Chest wall:  No tenderness or deformity. Heart:  Regular rate and rhythm, S1, S2 normal, no murmur, click, rub or gallop. Abdomen:   Soft, non-tender. Bowel sounds normal. No masses,  No organomegaly. Extremities:  Contractures and externally rotated right lower extremity   Pulses: 2+ and symmetric all extremities. Skin: Skin color, texture, turgor normal. No rashes or lesions   Neurologic: CNII-XII intact. EKG:  normal EKG, normal sinus rhythm, unchanged from previous tracings.       Data Review:     Recent Days:  Recent Labs     01/23/20  1631   WBC 6.0   HGB 11.9   HCT 38.7        Recent Labs     01/23/20  1430      K 3.6      CO2 27   *   BUN 16   CREA 0.71   CA 9.5   ALB 3.5   SGOT 28   ALT 25     No results for input(s): PH, PCO2, PO2, HCO3, FIO2 in the last 72 hours. 24 Hour Results:  Recent Results (from the past 24 hour(s))   METABOLIC PANEL, COMPREHENSIVE    Collection Time: 01/23/20  2:30 PM   Result Value Ref Range    Sodium 140 136 - 145 mmol/L    Potassium 3.6 3.5 - 5.1 mmol/L    Chloride 106 97 - 108 mmol/L    CO2 27 21 - 32 mmol/L    Anion gap 7 5 - 15 mmol/L    Glucose 104 (H) 65 - 100 mg/dL    BUN 16 6 - 20 MG/DL    Creatinine 0.71 0.55 - 1.02 MG/DL    BUN/Creatinine ratio 23 (H) 12 - 20      GFR est AA >60 >60 ml/min/1.73m2    GFR est non-AA >60 >60 ml/min/1.73m2    Calcium 9.5 8.5 - 10.1 MG/DL    Bilirubin, total 1.0 0.2 - 1.0 MG/DL    ALT (SGPT) 25 12 - 78 U/L    AST (SGOT) 28 15 - 37 U/L    Alk. phosphatase 73 45 - 117 U/L    Protein, total 8.4 (H) 6.4 - 8.2 g/dL    Albumin 3.5 3.5 - 5.0 g/dL    Globulin 4.9 (H) 2.0 - 4.0 g/dL    A-G Ratio 0.7 (L) 1.1 - 2.2     SAMPLES BEING HELD    Collection Time: 01/23/20  2:30 PM   Result Value Ref Range    SAMPLES BEING HELD 1RED     COMMENT        Add-on orders for these samples will be processed based on acceptable specimen integrity and analyte stability, which may vary by analyte.    EKG, 12 LEAD, INITIAL    Collection Time: 01/23/20  3:15 PM   Result Value Ref Range    Ventricular Rate 95 BPM    Atrial Rate 95 BPM    P-R Interval 142 ms    QRS Duration 50 ms    Q-T Interval 328 ms    QTC Calculation (Bezet) 412 ms    Calculated P Axis 101 degrees    Calculated R Axis -7 degrees    Calculated T Axis -33 degrees    Diagnosis       Normal sinus rhythm  Inferior infarct (cited on or before 23-JAN-2020)  When compared with ECG of 22-FEB-2017 08:56,  ST now depressed in Inferior leads  T wave inversion now evident in Inferior leads  Nonspecific T wave abnormality, worse in Lateral leads  Baseline artifact  Confirmed by Robert Brooks MD, Richard Amador ([id number]) on 1/23/2020 4:21:32 PM     CBC WITH AUTOMATED DIFF    Collection Time: 01/23/20  4:31 PM   Result Value Ref Range    WBC 6.0 3.6 - 11.0 K/uL    RBC 4.38 3.80 - 5.20 M/uL    HGB 11.9 11.5 - 16.0 g/dL    HCT 38.7 35.0 - 47.0 %    MCV 88.4 80.0 - 99.0 FL    MCH 27.2 26.0 - 34.0 PG    MCHC 30.7 30.0 - 36.5 g/dL    RDW 13.2 11.5 - 14.5 %    PLATELET 794 191 - 621 K/uL    MPV 9.9 8.9 - 12.9 FL    NRBC 0.0 0  WBC    ABSOLUTE NRBC 0.00 0.00 - 0.01 K/uL    NEUTROPHILS 58 32 - 75 %    LYMPHOCYTES 32 12 - 49 %    MONOCYTES 9 5 - 13 %    EOSINOPHILS 1 0 - 7 %    BASOPHILS 0 0 - 1 %    IMMATURE GRANULOCYTES 0 0.0 - 0.5 %    ABS. NEUTROPHILS 3.4 1.8 - 8.0 K/UL    ABS. LYMPHOCYTES 2.0 0.8 - 3.5 K/UL    ABS. MONOCYTES 0.6 0.0 - 1.0 K/UL    ABS. EOSINOPHILS 0.1 0.0 - 0.4 K/UL    ABS. BASOPHILS 0.0 0.0 - 0.1 K/UL    ABS. IMM. GRANS. 0.0 0.00 - 0.04 K/UL    DF AUTOMATED     SAMPLES BEING HELD    Collection Time: 01/23/20  4:31 PM   Result Value Ref Range    SAMPLES BEING HELD 1PST,1BLUE     COMMENT        Add-on orders for these samples will be processed based on acceptable specimen integrity and analyte stability, which may vary by analyte. Imaging:     Assessment:     Active Problems:    Hip fracture ([facility] [address] (1/23/2020)           Plan:     1. Subcapital right proximal femur fracture. Ortho has been consulted. No family bedside.   We are admitting to Ortho unit n.p.o. after midnight  Not much risk factors identified at this point of time also with a limited history    #2 epilepsy  Continue Keppra    #3  Hypertension  Hold amlodipine    #4 cerebral palsy    DVT prophylaxis with SCD for now and start Lovenox post surgery  Full code  Disposal to be decided       Signed By: Tessa Solorio MD     January 23, 2020

## 2020-01-23 NOTE — PROGRESS NOTES
Admission Medication Reconciliation:    Information obtained from:  [facility] documents   RxQuery data available¹:  NO    Comments/Recommendations: Updated PTA meds/reviewed patient's allergies. PTA med list compiled from [facility]  transfer documents, patient is nonverbal and there is no one at bedside to assist with information. Administration times not updated. Transfer documents returned to ER-05. Medication changes (since last review): Added:  Dulcolax suppository    Revised:  APAP: added parameters  Amlodipine dose increased  Docusate liquid to capsule form  Keppra dose increased    Thank you for allowing me to participate in the care of your patient. Abdiaziz BarreraD, RN #[id number] [facility] benefit data reflects medications filled and processed through the patient's insurance, however   this data does NOT capture whether the medication was picked up or is currently being taken by the patient. Allergies:  Patient has no known allergies. Significant PMH/Disease States:   Past Medical History:   Diagnosis Date    Epilepsy ([facility] [address]     Gait instability     Hypertension     Neurological disorder     Seizures ([facility] [address]     UTI (urinary tract infection)     Vitamin D deficiency      Chief Complaint for this Admission:    Chief Complaint   Patient presents with    Hip Injury     Prior to Admission Medications:   Prior to Admission Medications   Prescriptions Last Dose Informant Taking?   acetaminophen (TYLENOL) 160 mg/5 mL liquid   No   Sig: Take 640 mg by mouth three (3) times daily as needed for Fever or Pain (Mild pain (PSR 1-3), low fever). amLODIPine (NORVASC) 2.5 mg tablet   No   Sig: Take 5 mg by mouth daily. bisacodyL (DULCOLAX, BISACODYL,) 10 mg suppository   Yes   Sig: Insert 10 mg into rectum daily as needed. docusate sodium (COLACE) 100 mg capsule   Yes   Sig: Take 100 mg by mouth daily.    levETIRAcetam (KEPPRA XR) 750 mg ER tablet   Yes   Sig: Take 1,500 mg by mouth daily. loratadine (CLARITIN) 10 mg tablet   Yes   Sig: Take 10 mg by mouth daily as needed (Congestion, cough). Facility-Administered Medications: None       Please contact the main inpatient pharmacy with any questions or concerns at [phone] and we will direct you to the clinical pharmacist covering this patient's care while in-house.    OSCAR Meraz

## 2020-01-24 ENCOUNTER — APPOINTMENT (OUTPATIENT)
Dept: GENERAL RADIOLOGY | Age: 75
DRG: 470 | End: 2020-01-24
Attending: PHYSICIAN ASSISTANT
Payer: MEDICARE

## 2020-01-24 ENCOUNTER — APPOINTMENT (OUTPATIENT)
Dept: GENERAL RADIOLOGY | Age: 75
DRG: 470 | End: 2020-01-24
Attending: ORTHOPAEDIC SURGERY
Payer: MEDICARE

## 2020-01-24 ENCOUNTER — ANESTHESIA EVENT (OUTPATIENT)
Dept: SURGERY | Age: 75
DRG: 470 | End: 2020-01-24
Payer: MEDICARE

## 2020-01-24 ENCOUNTER — ANESTHESIA (OUTPATIENT)
Dept: SURGERY | Age: 75
DRG: 470 | End: 2020-01-24
Payer: MEDICARE

## 2020-01-24 LAB
ALBUMIN SERPL-MCNC: 3.1 G/DL (ref 3.5–5)
ALBUMIN/GLOB SERPL: 0.7 {RATIO} (ref 1.1–2.2)
ALP SERPL-CCNC: 65 U/L (ref 45–117)
ALT SERPL-CCNC: 22 U/L (ref 12–78)
ANION GAP SERPL CALC-SCNC: 8 MMOL/L (ref 5–15)
AST SERPL-CCNC: 24 U/L (ref 15–37)
BASOPHILS # BLD: 0 K/UL (ref 0–0.1)
BASOPHILS NFR BLD: 0 % (ref 0–1)
BILIRUB SERPL-MCNC: 0.8 MG/DL (ref 0.2–1)
BUN SERPL-MCNC: 14 MG/DL (ref 6–20)
BUN/CREAT SERPL: 25 (ref 12–20)
CALCIUM SERPL-MCNC: 9.2 MG/DL (ref 8.5–10.1)
CHLORIDE SERPL-SCNC: 106 MMOL/L (ref 97–108)
CO2 SERPL-SCNC: 27 MMOL/L (ref 21–32)
CREAT SERPL-MCNC: 0.55 MG/DL (ref 0.55–1.02)
DIFFERENTIAL METHOD BLD: ABNORMAL
EOSINOPHIL # BLD: 0.1 K/UL (ref 0–0.4)
EOSINOPHIL NFR BLD: 2 % (ref 0–7)
ERYTHROCYTE [DISTWIDTH] IN BLOOD BY AUTOMATED COUNT: 13.1 % (ref 11.5–14.5)
GLOBULIN SER CALC-MCNC: 4.3 G/DL (ref 2–4)
GLUCOSE SERPL-MCNC: 96 MG/DL (ref 65–100)
HCT VFR BLD AUTO: 37.4 % (ref 35–47)
HGB BLD-MCNC: 11.8 G/DL (ref 11.5–16)
IMM GRANULOCYTES # BLD AUTO: 0 K/UL (ref 0–0.04)
IMM GRANULOCYTES NFR BLD AUTO: 0 % (ref 0–0.5)
LYMPHOCYTES # BLD: 1.7 K/UL (ref 0.8–3.5)
LYMPHOCYTES NFR BLD: 29 % (ref 12–49)
MCH RBC QN AUTO: 27.3 PG (ref 26–34)
MCHC RBC AUTO-ENTMCNC: 31.6 G/DL (ref 30–36.5)
MCV RBC AUTO: 86.6 FL (ref 80–99)
MONOCYTES # BLD: 0.8 K/UL (ref 0–1)
MONOCYTES NFR BLD: 14 % (ref 5–13)
NEUTS SEG # BLD: 3.1 K/UL (ref 1.8–8)
NEUTS SEG NFR BLD: 55 % (ref 32–75)
NRBC # BLD: 0 K/UL (ref 0–0.01)
NRBC BLD-RTO: 0 PER 100 WBC
PLATELET # BLD AUTO: 141 K/UL (ref 150–400)
PMV BLD AUTO: 9.3 FL (ref 8.9–12.9)
POTASSIUM SERPL-SCNC: 3.2 MMOL/L (ref 3.5–5.1)
PROT SERPL-MCNC: 7.4 G/DL (ref 6.4–8.2)
RBC # BLD AUTO: 4.32 M/UL (ref 3.8–5.2)
SODIUM SERPL-SCNC: 141 MMOL/L (ref 136–145)
WBC # BLD AUTO: 5.7 K/UL (ref 3.6–11)

## 2020-01-24 PROCEDURE — 74011250636 HC RX REV CODE- 250/636: Performed by: NURSE ANESTHETIST, CERTIFIED REGISTERED

## 2020-01-24 PROCEDURE — 77030038269 HC DRN EXT URIN PURWCK BARD -A

## 2020-01-24 PROCEDURE — 77030011640 HC PAD GRND REM COVD -A: Performed by: ORTHOPAEDIC SURGERY

## 2020-01-24 PROCEDURE — 74011250636 HC RX REV CODE- 250/636: Performed by: NURSE PRACTITIONER

## 2020-01-24 PROCEDURE — 65270000029 HC RM PRIVATE

## 2020-01-24 PROCEDURE — 76010000131 HC OR TIME 2 TO 2.5 HR: Performed by: ORTHOPAEDIC SURGERY

## 2020-01-24 PROCEDURE — 77030018836 HC SOL IRR NACL ICUM -A: Performed by: ORTHOPAEDIC SURGERY

## 2020-01-24 PROCEDURE — 76210000017 HC OR PH I REC 1.5 TO 2 HR: Performed by: ORTHOPAEDIC SURGERY

## 2020-01-24 PROCEDURE — 74011000258 HC RX REV CODE- 258: Performed by: NURSE PRACTITIONER

## 2020-01-24 PROCEDURE — 77030020365 HC SOL INJ SOD CL 0.9% 50ML: Performed by: ORTHOPAEDIC SURGERY

## 2020-01-24 PROCEDURE — 0SRR01A REPLACEMENT OF RIGHT HIP JOINT, FEMORAL SURFACE WITH METAL SYNTHETIC SUBSTITUTE, UNCEMENTED, OPEN APPROACH: ICD-10-PCS | Performed by: ORTHOPAEDIC SURGERY

## 2020-01-24 PROCEDURE — 74011000250 HC RX REV CODE- 250: Performed by: NURSE ANESTHETIST, CERTIFIED REGISTERED

## 2020-01-24 PROCEDURE — 74011250636 HC RX REV CODE- 250/636: Performed by: INTERNAL MEDICINE

## 2020-01-24 PROCEDURE — 73501 X-RAY EXAM HIP UNI 1 VIEW: CPT

## 2020-01-24 PROCEDURE — 74011000258 HC RX REV CODE- 258: Performed by: INTERNAL MEDICINE

## 2020-01-24 PROCEDURE — 36415 COLL VENOUS BLD VENIPUNCTURE: CPT

## 2020-01-24 PROCEDURE — 77030036660

## 2020-01-24 PROCEDURE — 77030006822 HC BLD SAW SAG BRSM -B: Performed by: ORTHOPAEDIC SURGERY

## 2020-01-24 PROCEDURE — 85025 COMPLETE CBC W/AUTO DIFF WBC: CPT

## 2020-01-24 PROCEDURE — 77030008684 HC TU ET CUF COVD -B: Performed by: ANESTHESIOLOGY

## 2020-01-24 PROCEDURE — 77030002933 HC SUT MCRYL J&J -A: Performed by: ORTHOPAEDIC SURGERY

## 2020-01-24 PROCEDURE — 80053 COMPREHEN METABOLIC PANEL: CPT

## 2020-01-24 PROCEDURE — 77030018846 HC SOL IRR STRL H20 ICUM -A: Performed by: ORTHOPAEDIC SURGERY

## 2020-01-24 PROCEDURE — 77030008462 HC STPLR SKN PROX J&J -A: Performed by: ORTHOPAEDIC SURGERY

## 2020-01-24 PROCEDURE — C1776 JOINT DEVICE (IMPLANTABLE): HCPCS | Performed by: ORTHOPAEDIC SURGERY

## 2020-01-24 PROCEDURE — 74011250636 HC RX REV CODE- 250/636: Performed by: ORTHOPAEDIC SURGERY

## 2020-01-24 PROCEDURE — 77030031139 HC SUT VCRL2 J&J -A: Performed by: ORTHOPAEDIC SURGERY

## 2020-01-24 PROCEDURE — 74011250637 HC RX REV CODE- 250/637: Performed by: PHYSICIAN ASSISTANT

## 2020-01-24 PROCEDURE — 77030020788: Performed by: ORTHOPAEDIC SURGERY

## 2020-01-24 PROCEDURE — 76060000035 HC ANESTHESIA 2 TO 2.5 HR: Performed by: ORTHOPAEDIC SURGERY

## 2020-01-24 PROCEDURE — 77030013079 HC BLNKT BAIR HGGR 3M -A: Performed by: ANESTHESIOLOGY

## 2020-01-24 PROCEDURE — 77030026438 HC STYL ET INTUB CARD -A: Performed by: ANESTHESIOLOGY

## 2020-01-24 PROCEDURE — 74011250636 HC RX REV CODE- 250/636: Performed by: PHYSICIAN ASSISTANT

## 2020-01-24 PROCEDURE — 74011000250 HC RX REV CODE- 250: Performed by: ORTHOPAEDIC SURGERY

## 2020-01-24 DEVICE — SELF CENTERING BI-POLAR HEAD 28MM ID 43MM OD
Type: IMPLANTABLE DEVICE | Site: HIP | Status: FUNCTIONAL
Brand: SELF CENTERING

## 2020-01-24 DEVICE — SUMMIT FEMORAL STEM 12/14 TAPER TAPER ED W/POROCOAT SIZE 4 STD 140MM
Type: IMPLANTABLE DEVICE | Site: HIP | Status: FUNCTIONAL
Brand: SUMMIT POROCOAT

## 2020-01-24 DEVICE — ARTICUL/EZE FEMORAL HEAD DIAMETER 28MM +1.5 12/14 TAPER
Type: IMPLANTABLE DEVICE | Site: HIP | Status: FUNCTIONAL
Brand: ARTICUL/EZE

## 2020-01-24 DEVICE — STEM FEM PRSS FT HIP BPLR/UPLR CEM: Type: IMPLANTABLE DEVICE | Status: FUNCTIONAL

## 2020-01-24 RX ORDER — FACIAL-BODY WIPES
10 EACH TOPICAL
Status: DISCONTINUED | OUTPATIENT
Start: 2020-01-24 | End: 2020-01-24 | Stop reason: SDUPTHER

## 2020-01-24 RX ORDER — MORPHINE SULFATE 2 MG/ML
1 INJECTION, SOLUTION INTRAMUSCULAR; INTRAVENOUS
Status: ACTIVE | OUTPATIENT
Start: 2020-01-24 | End: 2020-01-25

## 2020-01-24 RX ORDER — ACETAMINOPHEN 325 MG/1
650 TABLET ORAL EVERY 6 HOURS
Status: DISCONTINUED | OUTPATIENT
Start: 2020-01-24 | End: 2020-01-29 | Stop reason: SDUPTHER

## 2020-01-24 RX ORDER — FACIAL-BODY WIPES
10 EACH TOPICAL DAILY PRN
Status: DISCONTINUED | OUTPATIENT
Start: 2020-01-26 | End: 2020-02-04 | Stop reason: HOSPADM

## 2020-01-24 RX ORDER — FENTANYL CITRATE 50 UG/ML
INJECTION, SOLUTION INTRAMUSCULAR; INTRAVENOUS AS NEEDED
Status: DISCONTINUED | OUTPATIENT
Start: 2020-01-24 | End: 2020-01-24 | Stop reason: HOSPADM

## 2020-01-24 RX ORDER — ROCURONIUM BROMIDE 10 MG/ML
INJECTION, SOLUTION INTRAVENOUS AS NEEDED
Status: DISCONTINUED | OUTPATIENT
Start: 2020-01-24 | End: 2020-01-24 | Stop reason: HOSPADM

## 2020-01-24 RX ORDER — POLYETHYLENE GLYCOL 3350 17 G/17G
17 POWDER, FOR SOLUTION ORAL DAILY
Status: DISCONTINUED | OUTPATIENT
Start: 2020-01-25 | End: 2020-02-04 | Stop reason: HOSPADM

## 2020-01-24 RX ORDER — AMOXICILLIN 250 MG
1 CAPSULE ORAL 2 TIMES DAILY
Status: DISCONTINUED | OUTPATIENT
Start: 2020-01-24 | End: 2020-02-04 | Stop reason: HOSPADM

## 2020-01-24 RX ORDER — SODIUM CHLORIDE 0.9 % (FLUSH) 0.9 %
5-40 SYRINGE (ML) INJECTION AS NEEDED
Status: DISCONTINUED | OUTPATIENT
Start: 2020-01-24 | End: 2020-02-04 | Stop reason: HOSPADM

## 2020-01-24 RX ORDER — ENOXAPARIN SODIUM 100 MG/ML
40 INJECTION SUBCUTANEOUS EVERY 24 HOURS
Status: DISCONTINUED | OUTPATIENT
Start: 2020-01-25 | End: 2020-02-04 | Stop reason: HOSPADM

## 2020-01-24 RX ORDER — PHENYLEPHRINE HCL IN 0.9% NACL 0.4MG/10ML
SYRINGE (ML) INTRAVENOUS AS NEEDED
Status: DISCONTINUED | OUTPATIENT
Start: 2020-01-24 | End: 2020-01-24 | Stop reason: HOSPADM

## 2020-01-24 RX ORDER — ONDANSETRON 2 MG/ML
INJECTION INTRAMUSCULAR; INTRAVENOUS AS NEEDED
Status: DISCONTINUED | OUTPATIENT
Start: 2020-01-24 | End: 2020-01-24 | Stop reason: HOSPADM

## 2020-01-24 RX ORDER — DEXAMETHASONE SODIUM PHOSPHATE 4 MG/ML
INJECTION, SOLUTION INTRA-ARTICULAR; INTRALESIONAL; INTRAMUSCULAR; INTRAVENOUS; SOFT TISSUE AS NEEDED
Status: DISCONTINUED | OUTPATIENT
Start: 2020-01-24 | End: 2020-01-24 | Stop reason: HOSPADM

## 2020-01-24 RX ORDER — SODIUM CHLORIDE 9 MG/ML
125 INJECTION, SOLUTION INTRAVENOUS CONTINUOUS
Status: DISCONTINUED | OUTPATIENT
Start: 2020-01-24 | End: 2020-01-24

## 2020-01-24 RX ORDER — LORATADINE 10 MG/1
10 TABLET ORAL
Status: DISCONTINUED | OUTPATIENT
Start: 2020-01-24 | End: 2020-02-04 | Stop reason: HOSPADM

## 2020-01-24 RX ORDER — HYDROXYZINE HYDROCHLORIDE 10 MG/1
10 TABLET, FILM COATED ORAL
Status: DISCONTINUED | OUTPATIENT
Start: 2020-01-24 | End: 2020-02-04 | Stop reason: HOSPADM

## 2020-01-24 RX ORDER — AMLODIPINE BESYLATE 5 MG/1
5 TABLET ORAL DAILY
Status: DISCONTINUED | OUTPATIENT
Start: 2020-01-25 | End: 2020-01-28

## 2020-01-24 RX ORDER — FERROUS SULFATE, DRIED 160(50) MG
1 TABLET, EXTENDED RELEASE ORAL
Status: DISCONTINUED | OUTPATIENT
Start: 2020-01-25 | End: 2020-02-04 | Stop reason: HOSPADM

## 2020-01-24 RX ORDER — CEFAZOLIN SODIUM/WATER 2 G/20 ML
2 SYRINGE (ML) INTRAVENOUS EVERY 8 HOURS
Status: COMPLETED | OUTPATIENT
Start: 2020-01-24 | End: 2020-01-25

## 2020-01-24 RX ORDER — LIDOCAINE HYDROCHLORIDE 20 MG/ML
INJECTION, SOLUTION EPIDURAL; INFILTRATION; INTRACAUDAL; PERINEURAL AS NEEDED
Status: DISCONTINUED | OUTPATIENT
Start: 2020-01-24 | End: 2020-01-24 | Stop reason: HOSPADM

## 2020-01-24 RX ORDER — TRAMADOL HYDROCHLORIDE 50 MG/1
50 TABLET ORAL
Status: DISCONTINUED | OUTPATIENT
Start: 2020-01-24 | End: 2020-01-30

## 2020-01-24 RX ORDER — ASPIRIN 325 MG
325 TABLET, DELAYED RELEASE (ENTERIC COATED) ORAL EVERY 12 HOURS
Status: DISCONTINUED | OUTPATIENT
Start: 2020-01-24 | End: 2020-01-25

## 2020-01-24 RX ORDER — SODIUM CHLORIDE, SODIUM LACTATE, POTASSIUM CHLORIDE, CALCIUM CHLORIDE 600; 310; 30; 20 MG/100ML; MG/100ML; MG/100ML; MG/100ML
INJECTION, SOLUTION INTRAVENOUS
Status: DISCONTINUED | OUTPATIENT
Start: 2020-01-24 | End: 2020-01-24 | Stop reason: HOSPADM

## 2020-01-24 RX ORDER — POTASSIUM CHLORIDE AND SODIUM CHLORIDE 900; 300 MG/100ML; MG/100ML
INJECTION, SOLUTION INTRAVENOUS CONTINUOUS
Status: DISCONTINUED | OUTPATIENT
Start: 2020-01-24 | End: 2020-01-25

## 2020-01-24 RX ORDER — CEFAZOLIN SODIUM 1 G/3ML
INJECTION, POWDER, FOR SOLUTION INTRAMUSCULAR; INTRAVENOUS AS NEEDED
Status: DISCONTINUED | OUTPATIENT
Start: 2020-01-24 | End: 2020-01-24 | Stop reason: HOSPADM

## 2020-01-24 RX ORDER — NALOXONE HYDROCHLORIDE 0.4 MG/ML
0.4 INJECTION, SOLUTION INTRAMUSCULAR; INTRAVENOUS; SUBCUTANEOUS AS NEEDED
Status: DISCONTINUED | OUTPATIENT
Start: 2020-01-24 | End: 2020-02-04 | Stop reason: HOSPADM

## 2020-01-24 RX ORDER — PROPOFOL 10 MG/ML
INJECTION, EMULSION INTRAVENOUS AS NEEDED
Status: DISCONTINUED | OUTPATIENT
Start: 2020-01-24 | End: 2020-01-24 | Stop reason: HOSPADM

## 2020-01-24 RX ORDER — SODIUM CHLORIDE 0.9 % (FLUSH) 0.9 %
5-40 SYRINGE (ML) INJECTION EVERY 8 HOURS
Status: DISCONTINUED | OUTPATIENT
Start: 2020-01-24 | End: 2020-02-04 | Stop reason: HOSPADM

## 2020-01-24 RX ADMIN — FENTANYL CITRATE 50 MCG: 50 INJECTION, SOLUTION INTRAMUSCULAR; INTRAVENOUS at 12:19

## 2020-01-24 RX ADMIN — PROPOFOL 100 MG: 10 INJECTION, EMULSION INTRAVENOUS at 11:30

## 2020-01-24 RX ADMIN — SENNOSIDES AND DOCUSATE SODIUM 1 TABLET: 8.6; 5 TABLET ORAL at 19:54

## 2020-01-24 RX ADMIN — FENTANYL CITRATE 50 MCG: 50 INJECTION, SOLUTION INTRAMUSCULAR; INTRAVENOUS at 11:30

## 2020-01-24 RX ADMIN — LEVETIRACETAM 1000 MG: 100 INJECTION, SOLUTION INTRAVENOUS at 02:02

## 2020-01-24 RX ADMIN — Medication 10 ML: at 02:02

## 2020-01-24 RX ADMIN — ONDANSETRON HYDROCHLORIDE 4 MG: 2 INJECTION, SOLUTION INTRAMUSCULAR; INTRAVENOUS at 13:16

## 2020-01-24 RX ADMIN — CEFAZOLIN 2 G: 330 INJECTION, POWDER, FOR SOLUTION INTRAMUSCULAR; INTRAVENOUS at 11:47

## 2020-01-24 RX ADMIN — ASPIRIN 325 MG: 325 TABLET, DELAYED RELEASE ORAL at 19:54

## 2020-01-24 RX ADMIN — Medication 2 G: at 19:54

## 2020-01-24 RX ADMIN — LEVETIRACETAM 1000 MG: 100 INJECTION, SOLUTION INTRAVENOUS at 15:47

## 2020-01-24 RX ADMIN — SUGAMMADEX 200 MG: 100 INJECTION, SOLUTION INTRAVENOUS at 13:16

## 2020-01-24 RX ADMIN — ACETAMINOPHEN 650 MG: 325 TABLET ORAL at 19:54

## 2020-01-24 RX ADMIN — Medication 80 MCG: at 12:55

## 2020-01-24 RX ADMIN — ROCURONIUM BROMIDE 25 MG: 10 SOLUTION INTRAVENOUS at 12:32

## 2020-01-24 RX ADMIN — MEPERIDINE HYDROCHLORIDE 25 MG: 50 INJECTION INTRAMUSCULAR; INTRAVENOUS; SUBCUTANEOUS at 13:24

## 2020-01-24 RX ADMIN — LIDOCAINE HYDROCHLORIDE 60 MG: 20 INJECTION, SOLUTION EPIDURAL; INFILTRATION; INTRACAUDAL; PERINEURAL at 11:30

## 2020-01-24 RX ADMIN — SODIUM CHLORIDE, POTASSIUM CHLORIDE, SODIUM LACTATE AND CALCIUM CHLORIDE: 600; 310; 30; 20 INJECTION, SOLUTION INTRAVENOUS at 11:13

## 2020-01-24 RX ADMIN — SODIUM CHLORIDE 125 ML/HR: 900 INJECTION, SOLUTION INTRAVENOUS at 14:30

## 2020-01-24 RX ADMIN — ROCURONIUM BROMIDE 25 MG: 10 SOLUTION INTRAVENOUS at 11:30

## 2020-01-24 RX ADMIN — DEXAMETHASONE SODIUM PHOSPHATE 8 MG: 4 INJECTION, SOLUTION INTRAMUSCULAR; INTRAVENOUS at 11:56

## 2020-01-24 RX ADMIN — POTASSIUM CHLORIDE AND SODIUM CHLORIDE: 900; 300 INJECTION, SOLUTION INTRAVENOUS at 19:52

## 2020-01-24 NOTE — PERIOP NOTES
Pt resting quietly, vitals stable. Pt opens eyes to name, some noted arm and leg movement but is overall very contracted. No signs of pain or distress when repositioned for pure wick use. Will continue to monitor. 1434: Spoke with Darrin AYOUB, pt can go back to room 606.    1532: Pt to go to 5s, ok per Darrin AYOUB via [employer]. Orders placed. 1600: Sister Sascha Jenkins at bedside, she states patient will occaisonally talk to her, she can normally tell if patient is having pain based on facial expression and guarding. She states patient can swallow pills in apple sauce but sometimes refuses, she also feeds herself with a spoon. Will relay this info for 5s nurse.
TRANSFER - OUT REPORT:    Verbal report given to Barbara PORTILLO(name) on Benito Wilson  being transferred to (unit) for routine post - op       Report consisted of patients Situation, Background, Assessment and   Recommendations(SBAR). Time Pre op antibiotic given:1147  Anesthesia Stop time: 5530    Information from the following report(s) SBAR and OR Summary was reviewed with the receiving nurse. Opportunity for questions and clarification was provided. Is the patient on 02? NO       L/Min        Other     Is the patient on a monitor? NO    Is the nurse transporting with the patient? NO    Surgical Waiting Area notified of patient's transfer from PACU?  YES      The following personal items collected during your admission accompanied patient upon transfer:   Dental Appliance:    Vision: Visual Aid: None  Hearing Aid:    Jewelry:    Clothing:    Other Valuables:    Valuables sent to safe:
Alert-The patient is alert, awake and responds to voice. The patient is oriented to time, place, and person. The triage nurse is able to obtain subjective information.

## 2020-01-24 NOTE — BRIEF OP NOTE
BRIEF OPERATIVE NOTE    Date of Procedure: 1/24/2020   Preoperative Diagnosis: RIGHT HIP FRACTURE  Postoperative Diagnosis: RIGHT HIP FRACTURE    Procedure(s):  RIGHT HIP HEMIARTHROPLASTY  Surgeon(s) and Role:     * Fely Myers MD - Primary         Surgical Assistant: Homar Sibley below    Surgical Staff:  Circ-1: Valentino Huynh RN  Circ-Relief: Jona Capellan RN  Radiology Technician: Shikha Walton  Scrub Tech-1: Roma Tovar  Scrub RN-Relief: Valentino Huynh RN  Retractor Jones: Kurt Barry  Surg Asst-1: Carla Santoyo  Event Time In Time Out   Incision Start 01/24/2020 1158    Incision Close       Anesthesia: General   Estimated Blood Loss: 100cc  Specimens: * No specimens in log *   Findings: displaced femoral neck   Complications: none  Implants:   Implant Name Type Inv.  Item Serial No.  Lot No. LRB No. Used Action   HEAD FEM [facility] [id number] GRY --  - SNA  HEAD FEM [facility] [id number] GRY --  NA [facility] OR[employer]OP[employer] [id number] Right 1 Implanted   STEM FEM 12/14 TAPR 4 -- [place] - SNA  STEM FEM 12/14 TAPR 4 -- [facility] NA [facility] [id number] Right [id number] Implanted   HEAD FEM +1.5 25MM 12/14 [place] - SNA  HEAD FEM +1.5 25MM 12/14 [place] NA [facility] [employer] [id number] Right [id number] Implanted

## 2020-01-24 NOTE — PROGRESS NOTES
Problem: Falls - Risk of  Goal: *Absence of Falls  Description  Document Debbie Maher Fall Risk and appropriate interventions in the flowsheet. Outcome: Progressing Towards Goal  Note: Fall Risk Interventions:  Mobility Interventions: Communicate number of staff needed for ambulation/transfer, PT Consult for mobility concerns, PT Consult for assist device competence, Utilize gait belt for transfers/ambulation    Mentation Interventions: Adequate sleep, hydration, pain control, Door open when patient unattended, Evaluate medications/consider consulting pharmacy    Medication Interventions: Evaluate medications/consider consulting pharmacy    Elimination Interventions: Call light in reach, Toileting schedule/hourly rounds              Problem: Risk for Spread of Infection  Goal: Prevent transmission of infectious organism to others  Description  Prevent the transmission of infectious organisms to other patients, staff members, and visitors. Outcome: Progressing Towards Goal     Problem: Pressure Injury - Risk of  Goal: *Prevention of pressure injury  Description  Document Romeo Scale and appropriate interventions in the flowsheet.   Outcome: Progressing Towards Goal  Note: Pressure Injury Interventions:       Moisture Interventions: Absorbent underpads, Apply protective barrier, creams and emollients, Check for incontinence Q2 hours and as needed    Activity Interventions: Increase time out of bed, Pressure redistribution bed/mattress(bed type)    Mobility Interventions: Float heels, Pressure redistribution bed/mattress (bed type)    Nutrition Interventions: Document food/fluid/supplement intake

## 2020-01-24 NOTE — PROGRESS NOTES
[address] [facility] Adult  Hospitalist Group                                                                                          Hospitalist Progress Note  Soto Alvarado MD  Answering service: [phone] from in house phone        Date of Service:  2020  NAME:  Roselia Irwin  :  1945  MRN:  [id number]      Admission Summary:   Roselia Irwin is a 69 y.o. female who has a history of epilepsy and cerebral palsy and is presenting from a facility [facility] with hip pain. Patient is not able to give me history as she is nonverbal.  It was patient sister who told the EMS that the patient is complaining of pain and difficulty walking because of her the right hip nobody knows that if she had fallen. She was sent here by the nursing home because they did a right hip fracture after the complaint but the sister ended noticed that there was hip fracture on the right side. She was sent to [place] for the same    Interval history / Subjective:      Patient seen and examined today. Drowsy postop     Assessment & Plan:     . Subcapital right proximal femur fracture. Status post right hip hemiarthroplasty by Ortho on 2020. Start Lovenox  management per Ortho    #2 epilepsy  Continue Keppra     #3  Hypertension  Hold amlodipine     #4 cerebral palsy      Code status: full   DVT prophylaxis: Lovenox    Care Plan discussed with: Patient/Family  Disposition: TBD     Hospital Problems  Never Reviewed          Codes Class Noted POA    Hip fracture [facility]) ICD-10-CM: V25.106N  ICD-9-CM: 820.8  2020 Unknown                Review of Systems:   A comprehensive review of systems was negative except for that written in the HPI. Vital Signs:    Last 24hrs VS reviewed since prior progress note.  Most recent are:  Visit Vitals  BP (P) 130/78   Pulse (P) 100   Temp (P) 98.2 °F (36.8 °C)   Resp (P) 14   Wt 50.9 kg (112 lb 3.4 oz)   SpO2 (P) 95%   BMI 21.92 kg/m²         Intake/Output Summary (Last 24 hours) at 1/24/2020 1748  Last data filed at 1/24/2020 1429  Gross per 24 hour   Intake 1100 ml   Output 275 ml   Net 825 ml        Physical Examination:             Constitutional:  non verbal and drowsy    ENT:  Oral mucous moist, oropharynx benign. Resp:  CTA bilaterally. No wheezing/rhonchi/rales. No accessory muscle use   CV:  Regular rhythm, normal rate, no murmurs, gallops, rubs    GI:  Soft, non distended, non tender. normoactive bowel sounds, no hepatosplenomegaly     Musculoskeletal:  No edema, warm, 2+ pulses throughout    Neurologic: Contractures            Data Review:    Review and/or order of clinical lab test      Labs:     Recent Labs     01/24/20  0325 01/23/20  1631   WBC 5.7 6.0   HGB 11.8 11.9   HCT 37.4 38.7   * 150     Recent Labs     01/24/20  0325 01/23/20  1430    140   K 3.2* 3.6    106   CO2 27 27   BUN 14 16   CREA 0.55 0.71   GLU 96 104*   CA 9.2 9.5     Recent Labs     01/24/20  0325 01/23/20  1430   SGOT 24 28   ALT 22 25   AP 65 73   TBILI 0.8 1.0   TP 7.4 8.4*   ALB 3.1* 3.5   GLOB 4.3* 4.9*     Recent Labs     01/23/20  1631   INR 1.0   PTP 10.3   APTT 26.2      No results for input(s): FE, TIBC, PSAT, FERR in the last 72 hours. No results found for: FOL, RBCF   No results for input(s): PH, PCO2, PO2 in the last 72 hours. No results for input(s): CPK, CKNDX, TROIQ in the last 72 hours.     No lab exists for component: CPKMB  No results found for: CHOL, CHOLX, CHLST, CHOLV, HDL, HDLP, LDL, LDLC, DLDLP, TGLX, TRIGL, TRIGP, CHHD, CHHDX  No results found for: GLUCPOC  Lab Results   Component Value Date/Time    Color DARK YELLOW 01/23/2020 02:30 PM    Appearance CLEAR 01/23/2020 02:30 PM    Specific gravity 1.027 01/23/2020 02:30 PM    Specific gravity 1.024 02/22/2017 09:20 AM    pH (UA) 5.5 01/23/2020 02:30 PM    Protein TRACE (A) 01/23/2020 02:30 PM    Glucose NEGATIVE  01/23/2020 02:30 PM    Ketone NEGATIVE  01/23/2020 02:30 PM    Bilirubin NEGATIVE 01/23/2020 02:30 PM    Urobilinogen 2.0 (H) 01/23/2020 02:30 PM    Nitrites NEGATIVE  01/23/2020 02:30 PM    Leukocyte Esterase TRACE (A) 01/23/2020 02:30 PM    Epithelial cells FEW 01/23/2020 02:30 PM    Bacteria 1+ (A) 01/23/2020 02:30 PM    WBC 0-4 01/23/2020 02:30 PM    RBC 0-5 01/23/2020 02:30 PM         Medications Reviewed:     Current Facility-Administered Medications   Medication Dose Route Frequency    [START ON 1/25/2020] amLODIPine (NORVASC) tablet 5 mg  5 mg Oral DAILY    loratadine (CLARITIN) tablet 10 mg  10 mg Oral DAILY PRN    0.9% sodium chloride infusion  125 mL/hr IntraVENous CONTINUOUS    sodium chloride (NS) flush 5-40 mL  5-40 mL IntraVENous Q8H    sodium chloride (NS) flush 5-40 mL  5-40 mL IntraVENous PRN    naloxone (NARCAN) injection 0.4 mg  0.4 mg IntraVENous PRN    [START ON 1/25/2020] calcium-vitamin D (OS-BROOKLYN) 500 mg-200 unit tablet  1 Tab Oral TID WITH MEALS    senna-docusate (PERICOLACE) 8.6-50 mg per tablet 1 Tab  1 Tab Oral BID    [START ON 1/25/2020] polyethylene glycol (MIRALAX) packet 17 g  17 g Oral DAILY    [START ON 1/26/2020] bisacodyL (DULCOLAX) suppository 10 mg  10 mg Rectal DAILY PRN    acetaminophen (TYLENOL) tablet 650 mg  650 mg Oral Q6H    traMADol (ULTRAM) tablet 50 mg  50 mg Oral Q6H PRN    morphine injection 1 mg  1 mg IntraVENous Q4H PRN    hydroxyzine HCL (ATARAX) tablet 10 mg  10 mg Oral Q8H PRN    ceFAZolin (ANCEF) 2 g/20 mL in sterile water IV syringe  2 g IntraVENous Q8H    aspirin delayed-release tablet 325 mg  325 mg Oral Q12H     ______________________________________________________________________  EXPECTED LENGTH OF STAY: - - -  ACTUAL LENGTH OF STAY:          1                 Esme Barton MD

## 2020-01-24 NOTE — ED NOTES
Pt turned and positioned on her back. Pt appears to have pain with movement. Orders released and will administer pain medication.

## 2020-01-24 NOTE — PROGRESS NOTES
TRANSFER - IN REPORT:    Verbal report received from [address] [place] [address] (name) on Joceline Goldsmith  being received from ED (unit) for routine progression of care      Report consisted of patients Situation, Background, Assessment and   Recommendations(SBAR). Information from the following report(s) SBAR, Kardex, ED Summary and MAR was reviewed with the receiving nurse. Opportunity for questions and clarification was provided. Assessment completed upon patients arrival to unit and care assumed.

## 2020-01-24 NOTE — ANESTHESIA PREPROCEDURE EVALUATION
Relevant Problems   No relevant active problems       Anesthetic History   No history of anesthetic complications            Review of Systems / Medical History  Patient summary reviewed, nursing notes reviewed and pertinent labs reviewed    Pulmonary  Within defined limits                 Neuro/Psych   Within defined limits  seizures        Comments: Cerebral palsy Cardiovascular  Within defined limits  Hypertension                   GI/Hepatic/Renal  Within defined limits              Endo/Other  Within defined limits           Other Findings              Physical Exam    Airway  Mallampati: II  TM Distance: > 6 cm  Neck ROM: decreased range of motion        Cardiovascular               Dental         Pulmonary                 Abdominal         Other Findings            Anesthetic Plan    ASA: 3  Anesthesia type: general          Induction: Intravenous  Anesthetic plan and risks discussed with: Patient and Family      Per surgeon

## 2020-01-24 NOTE — ROUTINE PROCESS
TRANSFER - OUT REPORT: 
 
Verbal report given to Estelita(name) on Kim Woodward  being transferred to [facility](unit) for routine progression of care Report consisted of patients Situation, Background, Assessment and  
Recommendations(SBAR). Information from the following report(s) SBAR, Kardex, ED Summary and MAR was reviewed with the receiving nurse. Lines:  
Peripheral IV 01/23/20 Right Hand (Active) Opportunity for questions and clarification was provided. Patient transported with: 
 [facility]

## 2020-01-24 NOTE — PROGRESS NOTES
ORTHO FRACTURE PROGRESS NOTE    2020  Admit Date:   2020    Post Op day: Day of Surgery    Subjective:    Katina Villaseñor is non-communicative but appears comfortable. Spoke with sister regarding surgical plan and possibility of surgery time moving up with Dr Efren Goldmann. Sister agrees and is willing to do verbal phone consent as she cannot make it here this morning. No new issues noted overnight.   NPO    PT/OT:   Gait:                    Vital Signs:    Patient Vitals for the past 8 hrs:   BP Temp Pulse Resp SpO2 Weight   20 1041      50.9 kg (112 lb 3.4 oz)   20 0823 121/84 99.2 °F (37.3 °C) 80 17 97 %    20 0315 103/65 99.7 °F (37.6 °C) 65 18 96 %      Temp (24hrs), Av.1 °F (37.3 °C), Min:97.7 °F (36.5 °C), Max:99.9 °F (37.7 °C)      Pain Control:   Pain Assessment  Pain Scale 1: Adult Nonverbal Pain Scale    Meds:    Current Facility-Administered Medications   Medication Dose Route Frequency    levETIRAcetam (KEPPRA) 1,000 mg in 0.9% sodium chloride 100 mL IVPB  1,000 mg IntraVENous DAILY    sodium chloride (NS) flush 5-40 mL  5-40 mL IntraVENous Q8H    sodium chloride (NS) flush 5-40 mL  5-40 mL IntraVENous PRN    acetaminophen (TYLENOL) tablet 650 mg  650 mg Oral Q4H PRN    naloxone (NARCAN) injection 0.4 mg  0.4 mg IntraVENous PRN    morphine injection 1 mg  1 mg IntraVENous Q4H PRN    ondansetron (ZOFRAN) injection 4 mg  4 mg IntraVENous Q4H PRN       LAB:    Recent Labs     20  0325 20  1631   HCT 37.4 38.7   HGB 11.8 11.9   INR  --  1.0       Transfuse PRBC's:      Assessment & Physician's Comment:  Right leg somewhat shortened compared to left  Does not respond to sensory or motor function requests  Neurovascular checks within normal limits  Orientation:  Disoriented (baseline)    Active Problems:    Hip fracture ([address] (2020)        Plan:    Consent for right hip hemiarthroplasty w Dr Efren Goldmann to be signed via phone by patient sister  Remain NPO  To OR this am  Mechanical DVT prophylaxis for now  Following    Darcy Dewitt PA-C   Orthopedic Trauma Service  [facility]

## 2020-01-24 NOTE — ANESTHESIA POSTPROCEDURE EVALUATION
Post-Anesthesia Evaluation and Assessment    Patient: Ross Medina MRN: [id number]  SSN: [id number]    YOB: 1945  Age: 69 y.o. Sex: female      I have evaluated the patient and they are stable and ready for discharge from the PACU. Cardiovascular Function/Vital Signs  Visit Vitals  /67 (BP 1 Location: Right arm, BP Patient Position: At rest)   Pulse 69   Temp 36.9 °C (98.4 °F)   Resp 12   Wt 50.9 kg (112 lb 3.4 oz)   SpO2 100%   BMI 21.92 kg/m²       Patient is status post General anesthesia for Procedure(s):  RIGHT HIP HEMIARTHROPLASTY. Nausea/Vomiting: None    Postoperative hydration reviewed and adequate. Pain:  Pain Scale 1: Visual (01/24/20 1339)  Pain Intensity 1: 0 (01/24/20 1339)   Managed    Neurological Status:   Neuro (WDL): Exceptions to WDL (01/24/20 1339)  Neuro  Neurologic State: Drowsy (01/24/20 1339)  Orientation Level: Unable to verbalize (01/24/20 1339)  Cognition: Unable to assess (comment) (01/24/20 1339)  Speech: (non verbal) (01/24/20 1339)  LUE Motor Response: Spontaneous ;Weak (01/24/20 1339)  LLE Motor Response: Spontaneous ;Weak (01/24/20 1339)  RUE Motor Response: Spontaneous ;Weak (01/24/20 1339)  RLE Motor Response: Spontaneous ;Weak (01/24/20 1339)   At baseline    Mental Status, Level of Consciousness: Alert and  oriented to person, place, and time    Pulmonary Status:   O2 Device: Nasal cannula (01/24/20 1341)   Adequate oxygenation and airway patent    Complications related to anesthesia: None    Post-anesthesia assessment completed. No concerns    Signed By: Geovany Pickering MD     January 24, 2020              Procedure(s):  RIGHT HIP HEMIARTHROPLASTY. general    <BSHSIANPOST>    Vitals Value Taken Time   /67 1/24/2020  2:00 PM   Temp 36.9 °C (98.4 °F) 1/24/2020  1:39 PM   Pulse 79 1/24/2020  2:01 PM   Resp 11 1/24/2020  2:01 PM   SpO2 100 % 1/24/2020  2:01 PM   Vitals shown include unvalidated device data.

## 2020-01-25 LAB
ANION GAP SERPL CALC-SCNC: 8 MMOL/L (ref 5–15)
BACTERIA SPEC CULT: NORMAL
BACTERIA SPEC CULT: NORMAL
BUN SERPL-MCNC: 11 MG/DL (ref 6–20)
BUN/CREAT SERPL: 17 (ref 12–20)
CALCIUM SERPL-MCNC: 8.6 MG/DL (ref 8.5–10.1)
CHLORIDE SERPL-SCNC: 113 MMOL/L (ref 97–108)
CO2 SERPL-SCNC: 20 MMOL/L (ref 21–32)
CREAT SERPL-MCNC: 0.65 MG/DL (ref 0.55–1.02)
GLUCOSE SERPL-MCNC: 111 MG/DL (ref 65–100)
HGB BLD-MCNC: 9.5 G/DL (ref 11.5–16)
POTASSIUM SERPL-SCNC: 4.3 MMOL/L (ref 3.5–5.1)
SERVICE CMNT-IMP: NORMAL
SODIUM SERPL-SCNC: 141 MMOL/L (ref 136–145)

## 2020-01-25 PROCEDURE — 74011250637 HC RX REV CODE- 250/637: Performed by: PHYSICIAN ASSISTANT

## 2020-01-25 PROCEDURE — 80048 BASIC METABOLIC PNL TOTAL CA: CPT

## 2020-01-25 PROCEDURE — 97161 PT EVAL LOW COMPLEX 20 MIN: CPT

## 2020-01-25 PROCEDURE — 85018 HEMOGLOBIN: CPT

## 2020-01-25 PROCEDURE — 36415 COLL VENOUS BLD VENIPUNCTURE: CPT

## 2020-01-25 PROCEDURE — 65270000029 HC RM PRIVATE

## 2020-01-25 PROCEDURE — 74011250636 HC RX REV CODE- 250/636: Performed by: HOSPITALIST

## 2020-01-25 PROCEDURE — 74011250636 HC RX REV CODE- 250/636: Performed by: INTERNAL MEDICINE

## 2020-01-25 PROCEDURE — 74011250636 HC RX REV CODE- 250/636: Performed by: PHYSICIAN ASSISTANT

## 2020-01-25 RX ORDER — SODIUM CHLORIDE 9 MG/ML
50 INJECTION, SOLUTION INTRAVENOUS CONTINUOUS
Status: DISCONTINUED | OUTPATIENT
Start: 2020-01-25 | End: 2020-01-30

## 2020-01-25 RX ADMIN — Medication 10 ML: at 21:07

## 2020-01-25 RX ADMIN — ENOXAPARIN SODIUM 40 MG: 40 INJECTION SUBCUTANEOUS at 13:07

## 2020-01-25 RX ADMIN — Medication 10 ML: at 13:08

## 2020-01-25 RX ADMIN — ACETAMINOPHEN 650 MG: 325 TABLET ORAL at 11:21

## 2020-01-25 RX ADMIN — ACETAMINOPHEN 650 MG: 325 TABLET ORAL at 00:47

## 2020-01-25 RX ADMIN — ACETAMINOPHEN 650 MG: 325 TABLET ORAL at 06:43

## 2020-01-25 RX ADMIN — POTASSIUM CHLORIDE AND SODIUM CHLORIDE: 900; 300 INJECTION, SOLUTION INTRAVENOUS at 04:08

## 2020-01-25 RX ADMIN — Medication 2 G: at 03:50

## 2020-01-25 RX ADMIN — SODIUM CHLORIDE 75 ML/HR: 900 INJECTION, SOLUTION INTRAVENOUS at 15:05

## 2020-01-25 RX ADMIN — TRAMADOL HYDROCHLORIDE 50 MG: 50 TABLET, FILM COATED ORAL at 03:50

## 2020-01-25 NOTE — PROGRESS NOTES
Primary Nurse Vaishnavi Patch and Barbara Mchugh RN performed a dual skin assessment on this patient Impairment noted- see wound doc flow sheet  Romeo score is 16  Skin on right arm;   Turned on right side

## 2020-01-25 NOTE — PROGRESS NOTES
[facility] FRACTURE PROGRESS NOTE    2020  Admit Date:   2020    Post Op day: 1 Day Post-Op    Subjective:    Rosendo De Los Santos remains non-communicative. Does not appear in distress. No events noted overnight.      PT/OT:   Gait:                    Vital Signs:    Patient Vitals for the past 8 hrs:   BP Temp Pulse Resp SpO2   20 0827 113/75 98 °F (36.7 °C) 84 15 95 %   20 0326 138/77 98.8 °F (37.1 °C) 80 14 100 %     Temp (24hrs), Av.4 °F (36.9 °C), Min:98 °F (36.7 °C), Max:98.8 °F (37.1 °C)      Pain Control:   Pain Assessment  Pain Scale 1: Adult Nonverbal Pain Scale  Pain Intensity 1: 0    Meds:    Current Facility-Administered Medications   Medication Dose Route Frequency    amLODIPine (NORVASC) tablet 5 mg  5 mg Oral DAILY    loratadine (CLARITIN) tablet 10 mg  10 mg Oral DAILY PRN    sodium chloride (NS) flush 5-40 mL  5-40 mL IntraVENous Q8H    sodium chloride (NS) flush 5-40 mL  5-40 mL IntraVENous PRN    naloxone (NARCAN) injection 0.4 mg  0.4 mg IntraVENous PRN    calcium-vitamin D (OS-BROOKLYN) 500 mg-200 unit tablet  1 Tab Oral TID WITH MEALS    senna-docusate (PERICOLACE) 8.6-50 mg per tablet 1 Tab  1 Tab Oral BID    polyethylene glycol (MIRALAX) packet 17 g  17 g Oral DAILY    [START ON 2020] bisacodyL (DULCOLAX) suppository 10 mg  10 mg Rectal DAILY PRN    acetaminophen (TYLENOL) tablet 650 mg  650 mg Oral Q6H    traMADol (ULTRAM) tablet 50 mg  50 mg Oral Q6H PRN    morphine injection 1 mg  1 mg IntraVENous Q4H PRN    hydroxyzine HCL (ATARAX) tablet 10 mg  10 mg Oral Q8H PRN    aspirin delayed-release tablet 325 mg  325 mg Oral Q12H    0.9% sodium chloride with KCl 40 mEq/L infusion   IntraVENous CONTINUOUS    enoxaparin (LOVENOX) injection 40 mg  40 mg SubCUTAneous Q24H       LAB:    Recent Labs     20  0407 20  0325 20  1631   HCT  --  37.4 38.7   HGB 9.5* 11.8 11.9   INR  --   --  1.0       Transfuse PRBC's:      Assessment & Physician's Comment:  Non-communicative; lying in right lateral decub position  Right thigh dressing clean without noted drainage  Does not respond to sensory/motor function requests  Distal pulses palpable  Dressing is clean, dry, and intact  Neurovascular checks within normal limits  Orientation:  Disoriented (baseline)    Principal Problem:    Hip fracture ([address] (1/23/2020)        Plan:    PT/OT as able; was minimal ambulator at baseline  Tylenol for pain  Lovenox for DVT prophylaxis  Should remain on at least some fluids as she us unlikely to take liquids PO on her own at this time  Medical management for DVT prophylaxis  Case Management for disposition Chandrakant Castellanos PA-C   Orthopedic Trauma Service  [facility]

## 2020-01-25 NOTE — PROGRESS NOTES
Problem: Mobility Impaired (Adult and Pediatric)  Goal: *Acute Goals and Plan of Care (Insert Text)  Description  FUNCTIONAL STATUS PRIOR TO ADMISSION: Pt non-verbal and unable to provide history. Per chart, pt lives in nursing facility where she ambulated short distances with unsteady gait, used w/c for increased distance    [address]: The patient lived in [address] [place] [address]  Initiated 1/25/2020  1. Patient will move from supine to sit and sit to supine  in bed with moderate assistance  within 7 day(s). 2.  Patient will transfer from bed to chair and chair to bed with mod assistance using the least restrictive device within 7 day(s). 3.  Patient will perform sit to stand with moderate assistance  within 7 day(s). 4.  Patient will ambulate with moderate assistance  for 5 feet with the least restrictive device within 7 day(s). Outcome: Not Met   PHYSICAL THERAPY EVALUATION  Patient: Quinn Madrigal (59 y.o. female)  Date: 1/25/2020  Primary Diagnosis: Hip fracture ([facility] [S72.009A]  Procedure(s) (LRB):  RIGHT HIP HEMIARTHROPLASTY (Right) 1 Day Post-Op   Precautions: fall, WBAT R LE         ASSESSMENT  Based on the objective data described below, the patient presents with global weakness, bilat UE/ LE spasticity (h/o CP), impaired balance, decreased tolerance to activity, pain behavior with movement, decreased function with mobility, increased risk for fall. Pt admitted from NH with R femur fx now s/p hemiarthroplasty POD#1. Pt mobilized with mod/ max A x 2 overall. Tolerated EOB and brief standing trial. Noted facial grimace with movement of R LE. Pt presents below functional baseline. Will benefit from mobility progression as tolerated in acute setting followed by SNF level rehab at d/c.     Current Level of Function Impacting Discharge (mobility/balance): bed mob max A x 2, static sitting balance min A, transfer sit to stand mod A x 2    Functional Outcome Measure: The patient scored 5/100 on the Barthel outcome measure which is indicative of near total dependency of caregiver assist for mobilization and completion of ADLs. Other factors to consider for discharge: pt      Patient will benefit from skilled therapy intervention to address the above noted impairments. PLAN :  Recommendations and Planned Interventions: bed mobility training, transfer training, gait training, therapeutic exercises, patient and family training/education and therapeutic activities      Frequency/Duration: Patient will be followed by physical therapy:  5 times a week to address goals. Recommendation for discharge: (in order for the patient to meet his/her long term goals)  Therapy up to 5 days/week in SNF setting    This discharge recommendation:  Has not yet been discussed the attending provider and/or case management    IF patient discharges home will need the following DME: to be determined (TBD)         SUBJECTIVE:   Patient non-verbal    OBJECTIVE DATA SUMMARY:   HISTORY:    Past Medical History:   Diagnosis Date    [address] ([facility]     Gait instability     Hypertension     Neurological disorder     Seizures ([facility]     UTI (urinary tract infection)     Vitamin D deficiency    History reviewed. No pertinent surgical history. Personal factors and/or comorbidities impacting plan of care: h/o CP and epilepsy, non-verbal         EXAMINATION/PRESENTATION/DECISION MAKING:   Critical Behavior:  Neurologic State: Alert  Orientation Level: Unable to verbalize  Cognition: Unable to assess (comment)(nonverbal)     Hearing:   Auditory  Auditory Impairment: None  Edema: mild edema R LE  Range Of Motion:  AROM: (decr hip/ knee ext bilat, functional for standing with A)           PROM: (decreased bilat LEs, h/o CP)           Strength:    Strength: Generally decreased, functional(decr R>L LE; functional for static stand with A)                    Tone & Sensation:   Tone: Abnormal(increased flexor tone bilat LEs)              Sensation: (unable to test)               Coordination:  Coordination: (unable to test)  Vision:      Functional Mobility:  Bed Mobility:     Supine to Sit: Maximum assistance;Assist x2; Additional time;Bed Modified  Sit to Supine: Maximum assistance;Assist x2  Scooting: Maximum assistance  Transfers:  Sit to Stand: Moderate assistance;Assist x2(assist for lift/ balance)  Stand to Sit: Minimum assistance;Assist x2                       Balance:   Sitting: Impaired(slumped posture, post lean)  Sitting - Static: Fair (occasional)  Sitting - Dynamic: Poor (constant support)  Standing: Impaired; With support  Standing - Static: Poor;Constant support(min A x 2)  Standing - Dynamic : Not tested            Functional Measure:  Barthel Index:    Bathin  Bladder: 0  Bowels: 0  Groomin  Dressin  Feedin  Mobility: 0  Stairs: 0  Toilet Use: 0  Transfer (Bed to Chair and Back): 5  Total: 5/100       The Barthel ADL Index: Guidelines  1. The index should be used as a record of what a patient does, not as a record of what a patient could do. 2. The main aim is to establish degree of independence from any help, physical or verbal, however minor and for whatever reason. 3. The need for supervision renders the patient not independent. 4. A patient's performance should be established using the best available evidence. Asking the patient, friends/relatives and nurses are the usual sources, but direct observation and common sense are also important. However direct testing is not needed. 5. Usually the patient's performance over the preceding 24-48 hours is important, but occasionally longer periods will be relevant. 6. Middle categories imply that the patient supplies over 50 per cent of the effort. 7. Use of aids to be independent is allowed. Pelon Bills., Barthel, D.W. (8610). Functional evaluation: the Barthel Index. [address] [facility] (14)2.   Shamar SoteloNaval Medical Center San Diego, ULISES ISBELL. Dru Cherry., Barbie Mayers., Aman Scales (1999). Measuring the change indisability after inpatient rehabilitation; comparison of the responsiveness of the Barthel Index and Functional [place] Measure. Journal of Neurology, Neurosurgery, and Psychiatry, 66(4), 666-256. ALANNAH Weiss, ESTEFANI Vela, & Robbin Adams M.A. (2004.) Assessment of post-stroke quality of life in cost-effectiveness studies: The usefulness of the Barthel Index and the EuroQoL-5D. Quality of Life Research, 15, 259-56          Physical Therapy Evaluation Charge Determination   History Examination Presentation Decision-Making   MEDIUM  Complexity : 1-2 comorbidities / personal factors will impact the outcome/ POC  LOW Complexity : 1-2 Standardized tests and measures addressing body structure, function, activity limitation and / or participation in recreation  LOW Complexity : Stable, uncomplicated  LOW Complexity : FOTO score of       Based on the above components, the patient evaluation is determined to be of the following complexity level: LOW     Pain Rating:  Pt demo facial grimace with movement of R LE    Activity Tolerance:   Fair  Please refer to the flowsheet for vital signs taken during this treatment. After treatment patient left in no apparent distress:   Supine in bed, Heels elevated for pressure relief, Call bell within reach, Bed / chair alarm activated and Side rails x 3    COMMUNICATION/EDUCATION:   The patients plan of care was discussed with: Registered Nurse. Patient is unable to participate in goal setting and plan of care.     Thank you for this referral.  Pooja Singh, PT   Time Calculation: 16 mins

## 2020-01-25 NOTE — PROGRESS NOTES
[address] [facility] Adult  Hospitalist Group                                                                                          Hospitalist Progress Note  Brice Pinto MD  Answering service: [phone] from in house phone        Date of Service:  2020  NAME:  Amanda Bach  :  1945  MRN:  [id number]      Admission Summary:   Amanda Bach is a 69 y.o. female who has a history of epilepsy and cerebral palsy and is presenting from a facility Replaced by [facility] with hip pain. Patient is not able to give me history as she is nonverbal.  It was patient sister who told the EMS that the patient is complaining of pain and difficulty walking because of her the right hip nobody knows that if she had fallen. She was sent here by the nursing home because they did a right hip fracture after the complaint but the sister ended noticed that there was hip fracture on the right side. She was sent to [facility] for the same    Interval history / Subjective:    F/u right proximal femur fracture  No new issues  Seems pain controlled  Hb dropped to 9.5 today     Assessment & Plan:     Subcapital right proximal femur fracture. Status post right hip hemiarthroplasty by Ortho on 2020.  -On  BID and Lovenox both for ?, will ask ortho  -management per Ortho, appreciate discussion with Ortho team  -PT/OT    epilepsy  Continue Keppra     Hypertension  Hold amlodipine     cerebral palsy  -supportive care    PT/OT SNF      Code status: full   DVT prophylaxis: Lovenox    Plan: SNF    Care Plan discussed with: Patient/Family  Disposition: TBD     Hospital Problems  Date Reviewed: 2020          Codes Class Noted POA    * (Principal) Hip fracture ([facility] ICD-10-CM: Z78.048V  ICD-9-CM: 820.8  2020 Yes                Review of Systems:   A comprehensive review of systems was negative except for that written in the HPI. Vital Signs:    Last 24hrs VS reviewed since prior progress note.  Most recent are:  Visit Vitals  /74 (BP 1 Location: Right arm, BP Patient Position: At rest)   Pulse 79   Temp 98.3 °F (36.8 °C)   Resp 14   Wt 50.9 kg (112 lb 3.4 oz)   SpO2 96%   BMI 21.92 kg/m²         Intake/Output Summary (Last 24 hours) at 1/25/2020 1855  Last data filed at 1/25/2020 1755  Gross per 24 hour   Intake    Output 700 ml   Net -700 ml        Physical Examination:             Constitutional:  non verbal and drowsy    ENT:  Oral mucous moist, oropharynx benign. Resp:  CTA bilaterally. No wheezing/rhonchi/rales. No accessory muscle use   CV:  Regular rhythm, normal rate, no murmurs, gallops, rubs    GI:  Soft, non distended, non tender. normoactive bowel sounds, no hepatosplenomegaly     Musculoskeletal:  No edema, warm, 2+ pulses throughout    Neurologic: Contractures            Data Review:    Review and/or order of clinical lab test      Labs:     Recent Labs     01/25/20  0407 01/24/20  0325 01/23/20  1631   WBC  --  5.7 6.0   HGB 9.5* 11.8 11.9   HCT  --  37.4 38.7   PLT  --  141* 150     Recent Labs     01/25/20  0407 01/24/20  0325 01/23/20  1430    141 140   K 4.3 3.2* 3.6   * 106 106   CO2 20* 27 27   BUN 11 14 16   CREA 0.65 0.55 0.71   * 96 104*   CA 8.6 9.2 9.5     Recent Labs     01/24/20  0325 01/23/20  1430   SGOT 24 28   ALT 22 25   AP 65 73   TBILI 0.8 1.0   TP 7.4 8.4*   ALB 3.1* 3.5   GLOB 4.3* 4.9*     Recent Labs     01/23/20  1631   INR 1.0   PTP 10.3   APTT 26.2      No results for input(s): FE, TIBC, PSAT, FERR in the last 72 hours. No results found for: FOL, RBCF   No results for input(s): PH, PCO2, PO2 in the last 72 hours. No results for input(s): CPK, CKNDX, TROIQ in the last 72 hours.     No lab exists for component: CPKMB  No results found for: CHOL, CHOLX, CHLST, CHOLV, HDL, HDLP, LDL, LDLC, DLDLP, TGLX, TRIGL, TRIGP, CHHD, CHHDX  No results found for: [facility]  Lab Results   Component Value Date/Time    Color DARK YELLOW 01/23/2020 02:30 PM Appearance CLEAR 01/23/2020 02:30 PM    Specific gravity 1.027 01/23/2020 02:30 PM    Specific gravity 1.024 02/22/2017 09:20 AM    pH (UA) 5.5 01/23/2020 02:30 PM    Protein TRACE (A) 01/23/2020 02:30 PM    Glucose NEGATIVE  01/23/2020 02:30 PM    Ketone NEGATIVE  01/23/2020 02:30 PM    Bilirubin NEGATIVE  01/23/2020 02:30 PM    Urobilinogen 2.0 (H) 01/23/2020 02:30 PM    Nitrites NEGATIVE  01/23/2020 02:30 PM    Leukocyte Esterase TRACE (A) 01/23/2020 02:30 PM    Epithelial cells FEW 01/23/2020 02:30 PM    Bacteria 1+ (A) 01/23/2020 02:30 PM    WBC 0-4 01/23/2020 02:30 PM    RBC 0-5 01/23/2020 02:30 PM         Medications Reviewed:     Current Facility-Administered Medications   Medication Dose Route Frequency    0.9% sodium chloride infusion  75 mL/hr IntraVENous CONTINUOUS    amLODIPine (NORVASC) tablet 5 mg  5 mg Oral DAILY    loratadine (CLARITIN) tablet 10 mg  10 mg Oral DAILY PRN    sodium chloride (NS) flush 5-40 mL  5-40 mL IntraVENous Q8H    sodium chloride (NS) flush 5-40 mL  5-40 mL IntraVENous PRN    naloxone (NARCAN) injection 0.4 mg  0.4 mg IntraVENous PRN    calcium-vitamin D (OS-BROOKLYN) 500 mg-200 unit tablet  1 Tab Oral TID WITH MEALS    senna-docusate (PERICOLACE) 8.6-50 mg per tablet 1 Tab  1 Tab Oral BID    polyethylene glycol (MIRALAX) packet 17 g  17 g Oral DAILY    [START ON 1/26/2020] bisacodyL (DULCOLAX) suppository 10 mg  10 mg Rectal DAILY PRN    acetaminophen (TYLENOL) tablet 650 mg  650 mg Oral Q6H    traMADol (ULTRAM) tablet 50 mg  50 mg Oral Q6H PRN    hydroxyzine HCL (ATARAX) tablet 10 mg  10 mg Oral Q8H PRN    aspirin delayed-release tablet 325 mg  325 mg Oral Q12H    enoxaparin (LOVENOX) injection 40 mg  40 mg SubCUTAneous Q24H     ______________________________________________________________________  EXPECTED LENGTH OF STAY: - - -  ACTUAL LENGTH OF STAY:          2                 Theo Tavera MD

## 2020-01-25 NOTE — PROGRESS NOTES
Bedside and Verbal shift change report given to Saúl Duval RN (oncoming nurse) by Sandhya Curry RN (offgoing nurse). Report included the following information SBAR, Kardex and MAR.

## 2020-01-26 LAB — HGB BLD-MCNC: 10.2 G/DL (ref 11.5–16)

## 2020-01-26 PROCEDURE — 97165 OT EVAL LOW COMPLEX 30 MIN: CPT

## 2020-01-26 PROCEDURE — 74011250636 HC RX REV CODE- 250/636: Performed by: HOSPITALIST

## 2020-01-26 PROCEDURE — 97535 SELF CARE MNGMENT TRAINING: CPT

## 2020-01-26 PROCEDURE — 85018 HEMOGLOBIN: CPT

## 2020-01-26 PROCEDURE — 74011250637 HC RX REV CODE- 250/637: Performed by: PHYSICIAN ASSISTANT

## 2020-01-26 PROCEDURE — 74011250636 HC RX REV CODE- 250/636: Performed by: INTERNAL MEDICINE

## 2020-01-26 PROCEDURE — 65270000029 HC RM PRIVATE

## 2020-01-26 PROCEDURE — 36415 COLL VENOUS BLD VENIPUNCTURE: CPT

## 2020-01-26 RX ADMIN — ENOXAPARIN SODIUM 40 MG: 40 INJECTION SUBCUTANEOUS at 12:54

## 2020-01-26 RX ADMIN — Medication 10 ML: at 13:48

## 2020-01-26 RX ADMIN — Medication 10 ML: at 04:47

## 2020-01-26 RX ADMIN — ACETAMINOPHEN 650 MG: 325 TABLET ORAL at 04:44

## 2020-01-26 RX ADMIN — Medication 10 ML: at 22:03

## 2020-01-26 RX ADMIN — SODIUM CHLORIDE 75 ML/HR: 900 INJECTION, SOLUTION INTRAVENOUS at 04:46

## 2020-01-26 RX ADMIN — ACETAMINOPHEN 650 MG: 325 TABLET ORAL at 11:31

## 2020-01-26 RX ADMIN — SODIUM CHLORIDE 50 ML/HR: 900 INJECTION, SOLUTION INTRAVENOUS at 17:59

## 2020-01-26 RX ADMIN — Medication 10 ML: at 11:31

## 2020-01-26 RX ADMIN — OYSTER SHELL CALCIUM WITH VITAMIN D 1 TABLET: 500; 200 TABLET, FILM COATED ORAL at 08:00

## 2020-01-26 RX ADMIN — ACETAMINOPHEN 650 MG: 325 TABLET ORAL at 22:03

## 2020-01-26 NOTE — PROGRESS NOTES
Bedside and Verbal shift change report given to Patrick Vega (oncoming nurse) by Milan Bolaños (offgoing nurse). Report included the following information SBAR.

## 2020-01-26 NOTE — OP NOTES
[address] [place]   OPERATIVE REPORT    Name:  Lonnie Rankin  MR#:  [id number]  :  1945  ACCOUNT #:  [de-identified]  DATE OF SERVICE:  2020      PREOPERATIVE DIAGNOSIS:  Displaced right femoral neck fracture. POSTOPERATIVE DIAGNOSIS:  Displaced right femoral neck fracture. PROCEDURE PERFORMED:  Hemiarthroplasty, right hip. SURGEON:  Adelina Hernandez MD    ASSISTANT:  Bre Matos. ANESTHESIA:  General.    COMPLICATIONS:  None. SPECIMENS REMOVED:  None. IMPLANTS:  DePuy size 4 standard offset [place] stem with a 1.5 hip ball and bipolar head. ESTIMATED BLOOD LOSS:  150 mL. DRAIN:  None. PREOPERATIVE ANTIBIOTIC:  Ancef. COUNTS:  Sponge, instrument, and needle counts were correct at the end of the procedure. INDICATIONS:  This is a 61-year-old woman. She presented to the emergency room and was found to have a displaced femoral neck fracture. Age was undetermined as the patient was nonverbal.    Options were discussed with the power of , and for comfort and return to ambulatory status, it was elected to proceed with a hemiarthroplasty. PROCEDURE:  Anesthetic was initiated. Preoperative dose of antibiotic was given. The right side was confirmed as the operative side, prepped and draped in the usual sterile fashion. Skin was covered with [place] occlusive dressing. Direct anterior exposure was made to the patient's hip through the sartorius tensor interval.  The anterior hip vasculature was suture ligated. Capsule was identified and opened from distal to proximal, protecting the acetabular labrum. Capsule was teed distally. Retractors were placed intraarticular. Femoral neck fracture completely displaced was identified. The femoral neck cleanup osteotomy was made and the head and osteotomy fragment were removed from the acetabulum. The acetabulum was exposed. The ligamentum teres was removed.   All debris was cleared and the bipolar head trial was placed. This was removed. The femur was then externally rotated and released the capsule from inside of the greater trochanter, extended the hip, placed deep retractors, entered the medullary canal of the femur with a box osteotome, broached to a size 4 calcar plane, placed the shortest hip ball and bipolar head and reduced. Fluoroscopic image confirmed satisfactory placement of the implant. The hip was then dislocated and repositioned. The real stem was impacted, the real hip ball was placed, the hip was reduced. Final images obtained. The deep wound was copiously irrigated. The capsule was closed over the femoral head with #2 Vicryl sutures. Irrigated the deep wound again, closed the fascia of the tensor fascia lanre with #2 Vicryl sutures interrupted. Soft tissues were infiltrated with local anesthetic. Skin and subcu were irrigated and closed in standard fashion. Sterile dressings applied. There were no complications. No specimen was sent. Procedure was hemiarthroplasty, right hip. The patient tolerated the procedure well. About 150-200 mL blood loss. There were no complications. The patient was taken to the recovery room stable.         Carlos Manuel Francisco MD [email or web address]  D:  01/26/2020 14:13  T:  01/26/2020 16:24  JOB #:  [id number]

## 2020-01-26 NOTE — PROGRESS NOTES
Nurse Karen Armstrong gave bedside report to oncoming nurse Elton Elizabeth RN by [facility] and adam

## 2020-01-26 NOTE — PROGRESS NOTES
Problem: Self Care Deficits Care Plan (Adult)  Goal: *Acute Goals and Plan of Care (Insert Text)  Description  Occupational Therapy Goals  Initiated: 1/26/2020   1. Patient will perform grooming with min A sitting in chair/chair position in bed within 7 day(s). 2.  Patient will perform upper body dressing with min A within 7 days. 3.  Patient will perform self feeding for 25% of her meals within 7 day(s). 4.  Patient will perform toilet transfers with mod A within 7 day(s). 5.  Patient will perform all aspects of toileting with mod A within 7 day(s). FUNCTIONAL STATUS PRIOR TO ADMISSION: Pt lives with her sister Tomer Oliva ([phone]). Contacted sister this morning to provided PLOF as chart was confusing. Patient lives with her sister and PACE support at home in the morning time to help prepare her for [facility] which is adult day care center. Pt is minimally ambulatory but does proper her w/c independently using her feet. She does ambulate in the home short distances. She is able to assist with upper body dressing but is total for LB ADL. She is total A for bathing activities. She does transfer to and from commode with supervision to min A. She goes to day care 5 days per week. She is nonverbal at baseline. She has a ramp on her home and does not require to climb stairs. All meals are provided by family and facility. She eats only using a spoon. At times, she is assisted with feeding due to time constraints for  in the mornings but she is able to feed herself. She only has a w/c at home and no other functioning equipment.      HOME SUPPORT: see above     Outcome: Progressing Towards Goal     OCCUPATIONAL THERAPY EVALUATION  Patient: Emily Pineda (37 y.o. female)  Date: 1/26/2020  Primary [facility]: [facility] [S72.009A]  Procedure(s) (LRB):  RIGHT HIP HEMIARTHROPLASTY (Right) 2 Days Post-Op   Precautions:   Fall, WBAT, Seizure(nonverbal)    ASSESSMENT  Based on the objective data described below, the patient presents with decreased independence with all self care and functional mobility following admission for fall resulting with right femur fracture. She is functional below her baseline following conversation with sister. Please see above comments regarding PLOF. This date, pt was able to progress to EOB with min A and remained sitting EOB for about 10 minutes requiring CG to supervision at times. She then returned to bed with bed placed in chair position. Patient happy and content during intervention. She also showed no non-verbal signs of pain with EOB activities. Recommend discharge home if family can provided 24 hour care but unclear if family can as she also cares for 79 y/o mother. She may require discharge to SNF setting. Current Level of Function Impacting Discharge (ADLs/self-care): total A    Functional Outcome Measure: The patient scored 5 on the Barthel Index outcome measure which is indicative of significant impairment with ADL activities. Other factors to consider for discharge: cognitive status, PLOF     Patient will benefit from skilled therapy intervention to address the above noted impairments. PLAN :  Recommendations and Planned Interventions: self care training, functional mobility training, therapeutic exercise, balance training, therapeutic activities, endurance activities, patient education, home safety training, and family training/education    Frequency/Duration: Patient will be followed by occupational therapy 3 times a week to address goals.     Recommendation for discharge: (in order for the patient to meet his/her long term goals)  Therapy up to 5 days/week in SNF setting or an intensive home health therapy program    This discharge recommendation:  Has been made in collaboration with the attending provider and/or case management    IF patient discharges home will need the following DME: TBD       SUBJECTIVE:   Patient stated Pt mumbling but no audible respones    OBJECTIVE DATA SUMMARY:   HISTORY:   Past Medical History:   Diagnosis Date    Epilepsy ([address]     Gait instability     Hypertension     Neurological disorder     Seizures (HCC)     UTI (urinary tract infection)     Vitamin D deficiency    History reviewed. No pertinent surgical history. Expanded or extensive additional review of patient history:     Home Situation  Home Environment: Private residence  [address] Name: (Ramp at home)  Wheelchair Ramp: Yes  One/Two Story Residence: One story  Living Alone: No  Support Systems: Family member(s)  Patient Expects to be Discharged to[de-identified] Rehabilitation facility  Current DME Used/Available at Home: Wheelchair  Tub or Shower Type: Tub/Shower combination    Hand dominance: Right    EXAMINATION OF PERFORMANCE DEFICITS:  Cognitive/Behavioral Status:  Neurologic State: Alert  Orientation Level: Oriented to person(answers to her name)  Cognition: (mostly nonverbal)  Perception: Appears intact  Perseveration: No perseveration noted  Safety/Judgement: Lack of insight into deficits    Skin: see nursing notes; dressing intact over hip    Edema: none noted    Hearing:   Auditory  Auditory Impairment: None    Vision/Perceptual:                           Acuity: Within Defined Limits         Range of Motion:    AROM: Generally decreased, functional(limited ROM and contractures noted in elbows and hands)  PROM: Generally decreased, functional(limited ROM and contractures noted in elbows and hands)                      Strength:    Strength: Generally decreased, functional(limited ROM and contractures noted in elbows and hands)                Coordination:  Coordination: Generally decreased, functional(limited ROM and contractures noted in elbows and hands)  Fine Motor Skills-Upper: Right Impaired;Left Impaired    Gross Motor Skills-Upper: Right Impaired;Left Impaired    Tone & Sensation:    Tone: Abnormal(increased flexor tone)  Sensation: (unable to assess due to cognitive status)                      Balance:  Sitting: Impaired  Sitting - Static: Fair (occasional)  Sitting - Dynamic: Fair (occasional)(min A to supervision for sitting balance)  Standing: (did not attempt standing this morning)    Functional Mobility and Transfers for ADLs:  Bed Mobility:  Rolling: Maximum assistance  Supine to Sit: Minimum assistance  Sit to Supine: Total assistance  Scooting: Total assistance    Transfers:  Sit to Stand: (did not progress to standing at this time)  Stand to Sit: (did not progress to standing at this time)  Bed to Chair: (did not progress to standing at this time)  Bathroom Mobility: (did not progress to standing at this time; purewick in place)  Toilet Transfer : (did not progress to standing at this time)    ADL Assessment:  Feeding: Total assistance    Oral Facial Hygiene/Grooming: Total assistance    Bathing: Total assistance    Upper Body Dressing: Total assistance    Lower Body Dressing: Total assistance    Toileting: Total assistance                ADL Intervention and task modifications:   Pt was able to progress to and from the EOB with min A and remained sitting to attempt to engage with ADL activities. Pt handed clothe to wash her face but made no initiation to wash face. Attempted [place] but again pt was  unable to complete tasks. Cognitive Retraining  Safety/Judgement: Lack of insight into deficits    Functional Measure:  Barthel Index:    Bathin  Bladder: 0  Bowels: 0  Groomin  Dressin  Feedin  Mobility: 0  Stairs: 0  Toilet Use: 0  Transfer (Bed to Chair and Back): 5  Total: 5/100        The Barthel ADL Index: Guidelines  1. The index should be used as a record of what a patient does, not as a record of what a patient could do. 2. The main aim is to establish degree of independence from any help, physical or verbal, however minor and for whatever reason.   3. The need for supervision renders the patient not independent. 4. A patient's performance should be established using the best available evidence. Asking the patient, friends/relatives and nurses are the usual sources, but direct observation and common sense are also important. However direct testing is not needed. 5. Usually the patient's performance over the preceding 24-48 hours is important, but occasionally longer periods will be relevant. 6. Middle categories imply that the patient supplies over 50 per cent of the effort. 7. Use of aids to be independent is allowed. Tracy Fields., Barthel, DNicolaW. (5719). Functional evaluation: the Barthel Index. [facility] (14)2. Elly Ban jennifer [place], J.J.M.F, Diego Valle., Chris Avila., Eduin, [address] (1999). Measuring the change indisability after inpatient rehabilitation; comparison of the responsiveness of the Barthel Index and Functional [place] Measure. Journal of Neurology, Neurosurgery, and Psychiatry, 66(4), 289-938. Kee Grimaldo, N.J.A, ESTEFANI Vela, & Linnea Diana MNicolaA. (2004.) Assessment of post-stroke quality of life in cost-effectiveness studies: The usefulness of the Barthel Index and the EuroQoL-5D. Quality of Life Research, 15, 078-72         Occupational Therapy Evaluation Charge Determination   History Examination Decision-Making   HIGH Complexity : Extensive review of history including physical, cognitive and psychosocial history  HIGH Complexity : 5 or more performance deficits relating to physical, cognitive , or psychosocial skils that result in activity limitations and / or participation restrictions HIGH Complexity : Patient presents with comorbidities that affect occupational performance.  Signifigant modification of tasks or assistance (eg, physical or verbal) with assessment (s) is necessary to enable patient to complete evaluation       Based on the above components, the patient evaluation is determined to be of the following complexity level: HIGH   Pain Rating:  No obvious pain     Activity Tolerance:   Good  Please refer to the flowsheet for vital signs taken during this treatment. After treatment patient left in no apparent distress:    Bed in chair position, bed alarm working, call bell within reach     COMMUNICATION/EDUCATION:   The patients plan of care was discussed with: Physical Therapist and Registered Nurse. Patient/family have participated as able in goal setting and plan of care. This patients plan of care is appropriate for delegation to MONIK.     Thank you for this referral.  Svetlana Jensen, OT  Time Calculation: 26 mins

## 2020-01-26 NOTE — PROGRESS NOTES
[facility] FRACTURE PROGRESS NOTE    2020  Admit Date:   2020    Post Op day: 2 Days Post-Op    Subjective:    Kim Woodward remains resting comfortably. Arouses to voice but does not respond to questions. PT/OT:   Gait:                    Vital Signs:    Patient Vitals for the past 8 hrs:   BP Temp Pulse Resp SpO2 Weight   20 0752      57 kg (125 lb 10.6 oz)   20 0735 157/79 97.3 °F (36.3 °C) 78 16 97 %    20 0530 128/66 98.8 °F (37.1 °C) 91 15 97 %      Temp (24hrs), Av °F (36.7 °C), Min:97.3 °F (36.3 °C), Max:98.8 °F (37.1 °C)      Pain Control:   Pain Assessment  Pain Scale 1: Adult Nonverbal Pain Scale  Pain Intensity 1: 0    Meds:    Current Facility-Administered Medications   Medication Dose Route Frequency    0.9% sodium chloride infusion  75 mL/hr IntraVENous CONTINUOUS    amLODIPine (NORVASC) tablet 5 mg  5 mg Oral DAILY    loratadine (CLARITIN) tablet 10 mg  10 mg Oral DAILY PRN    sodium chloride (NS) flush 5-40 mL  5-40 mL IntraVENous Q8H    sodium chloride (NS) flush 5-40 mL  5-40 mL IntraVENous PRN    naloxone (NARCAN) injection 0.4 mg  0.4 mg IntraVENous PRN    calcium-vitamin D (OS-BROOKLYN) 500 mg-200 unit tablet  1 Tab Oral TID WITH MEALS    senna-docusate (PERICOLACE) 8.6-50 mg per tablet 1 Tab  1 Tab Oral BID    polyethylene glycol (MIRALAX) packet 17 g  17 g Oral DAILY    bisacodyL (DULCOLAX) suppository 10 mg  10 mg Rectal DAILY PRN    acetaminophen (TYLENOL) tablet 650 mg  650 mg Oral Q6H    traMADol (ULTRAM) tablet 50 mg  50 mg Oral Q6H PRN    hydroxyzine HCL (ATARAX) tablet 10 mg  10 mg Oral Q8H PRN    enoxaparin (LOVENOX) injection 40 mg  40 mg SubCUTAneous Q24H       LAB:    Recent Labs     20  0321  20  0325 20  1631   HCT  --   --  37.4 38.7   HGB 10.2*   < > 11.8 11.9   INR  --   --   --  1.0    < > = values in this interval not displayed.        Transfuse PRBC's:      Assessment & Physician's Comment:  Right thigh soft and compressible  Calves soft  Does not respond to sensory or motor function requests but is seen to move lower extremities actively  Distal pulses palpable  Dressing is with mild drainge noted at upper margin of bandage but not saturated  Neurovascular checks within normal limits  Orientation:  Disoriented (baseline)    Principal Problem:    Hip fracture ([address] (1/23/2020)        Plan:    Cont PT/OT as able - WBAT but does not mobilize much at baseline  Tylenol for pain  Ice packs to hip PRN  Lovenox for DVT prophylaxis  Medical management per primary team  Case Management for disposition planning - likely SNF  OK to discharge from Ortho perspective  Follow-up with Dr Tania Ferguson in about 3 weeks    Cally Veliz PA-C   Orthopedic Trauma Service  [address] [facility]

## 2020-01-26 NOTE — PROGRESS NOTES
[facility] [facility] Adult  Hospitalist Group                                                                                          Hospitalist Progress Note  Talat Main MD  Answering service: [phone] from in house phone        Date of Service:  2020  NAME:  Kim Woodward  :  1945  MRN:  [id number]      Admission Summary:   Kim Woodward is a 69 y.o. female who has a history of epilepsy and cerebral palsy and is presenting from a facility [facility] with hip pain. Patient is not able to give me history as she is nonverbal.  It was patient sister who told the EMS that the patient is complaining of pain and difficulty walking because of her the right hip nobody knows that if she had fallen. She was sent here by the nursing home because they did a right hip fracture after the complaint but the sister ended noticed that there was hip fracture on the right side. She was sent to [facility] for the same    Interval history / Subjective:    F/u right proximal femur fracture  No new issues resting in bed  Seems pain controlled  Will need rehab placement. Assessment & Plan:     Subcapital right proximal femur fracture. Status post right hip hemiarthroplasty by Ortho on 2020.  -On  BID and Lovenox both for ?, will ask ortho  -management per Ortho, appreciate discussion with Ortho team  -PT/OT    epilepsy  Continue Keppra     Hypertension  Hold amlodipine     cerebral palsy  -supportive care    PT/OT SNF      Code status: full   DVT prophylaxis: Lovenox    Plan: SNF    Care Plan discussed with: Patient/Family  Disposition: TBD     Hospital Problems  Date Reviewed: 2020          Codes Class Noted POA    * (Principal) [facility] ([facility] ICD-10-CM: T43.594D  ICD-9-CM: 820.8  2020 Yes                Review of Systems:   A comprehensive review of systems was negative except for that written in the HPI.        Vital Signs:    Last 24hrs VS reviewed since prior progress note. Most recent are:  Visit Vitals  /79 (BP 1 Location: Left leg, BP Patient Position: At rest)   Pulse 78   Temp 97.3 °F (36.3 °C)   Resp 16   Wt 57 kg (125 lb 10.6 oz)   SpO2 97%   BMI 24.54 kg/m²         Intake/Output Summary (Last 24 hours) at 1/26/2020 1302  Last data filed at 1/26/2020 0530  Gross per 24 hour   Intake    Output 1450 ml   Net -1450 ml        Physical Examination:             Constitutional:  non verbal and drowsy    ENT:  Oral mucous moist, oropharynx benign. Resp:  CTA bilaterally. No wheezing/rhonchi/rales. No accessory muscle use   CV:  Regular rhythm, normal rate, no murmurs, gallops, rubs    GI:  Soft, non distended, non tender. normoactive bowel sounds, no hepatosplenomegaly     Musculoskeletal:  No edema, warm, 2+ pulses throughout    Neurologic: Contractures            Data Review:    Review and/or order of clinical lab test      Labs:     Recent Labs     01/26/20 0321 01/25/20 0407 01/24/20 0325 01/23/20  1631   WBC  --   --  5.7 6.0   HGB 10.2* 9.5* 11.8 11.9   HCT  --   --  37.4 38.7   PLT  --   --  141* 150     Recent Labs     01/25/20  0407 01/24/20  0325 01/23/20  1430    141 140   K 4.3 3.2* 3.6   * 106 106   CO2 20* 27 27   BUN 11 14 16   CREA 0.65 0.55 0.71   * 96 104*   CA 8.6 9.2 9.5     Recent Labs     01/24/20  0325 01/23/20  1430   SGOT 24 28   ALT 22 25   AP 65 73   TBILI 0.8 1.0   TP 7.4 8.4*   ALB 3.1* 3.5   GLOB 4.3* 4.9*     Recent Labs     01/23/20  1631   INR 1.0   PTP 10.3   APTT 26.2      No results for input(s): FE, TIBC, PSAT, FERR in the last 72 hours. No results found for: FOL, RBCF   No results for input(s): PH, PCO2, PO2 in the last 72 hours. No results for input(s): CPK, CKNDX, TROIQ in the last 72 hours.     No lab exists for component: CPKMB  No results found for: CHOL, CHOLX, CHLST, CHOLV, HDL, HDLP, LDL, LDLC, DLDLP, TGLX, TRIGL, TRIGP, CHHD, CHHDX  No results found for: Adaline Feeling  Lab Results   Component Value Date/Time    Color DARK YELLOW 01/23/2020 02:30 PM    Appearance CLEAR 01/23/2020 02:30 PM    Specific gravity 1.027 01/23/2020 02:30 PM    Specific gravity 1.024 02/22/2017 09:20 AM    pH (UA) 5.5 01/23/2020 02:30 PM    Protein TRACE (A) 01/23/2020 02:30 PM    Glucose NEGATIVE  01/23/2020 02:30 PM    Ketone NEGATIVE  01/23/2020 02:30 PM    Bilirubin NEGATIVE  01/23/2020 02:30 PM    Urobilinogen 2.0 (H) 01/23/2020 02:30 PM    Nitrites NEGATIVE  01/23/2020 02:30 PM    Leukocyte Esterase TRACE (A) 01/23/2020 02:30 PM    Epithelial cells FEW 01/23/2020 02:30 PM    Bacteria 1+ (A) 01/23/2020 02:30 PM    WBC 0-4 01/23/2020 02:30 PM    RBC 0-5 01/23/2020 02:30 PM         Medications Reviewed:     Current Facility-Administered Medications   Medication Dose Route Frequency    0.9% sodium chloride infusion  75 mL/hr IntraVENous CONTINUOUS    amLODIPine (NORVASC) tablet 5 mg  5 mg Oral DAILY    loratadine (CLARITIN) tablet 10 mg  10 mg Oral DAILY PRN    sodium chloride (NS) flush 5-40 mL  5-40 mL IntraVENous Q8H    sodium chloride (NS) flush 5-40 mL  5-40 mL IntraVENous PRN    naloxone (NARCAN) injection 0.4 mg  0.4 mg IntraVENous PRN    calcium-vitamin D (OS-BROOKLYN) 500 mg-200 unit tablet  1 Tab Oral TID WITH MEALS    senna-docusate (PERICOLACE) 8.6-50 mg per tablet 1 Tab  1 Tab Oral BID    polyethylene glycol (MIRALAX) packet 17 g  17 g Oral DAILY    bisacodyL (DULCOLAX) suppository 10 mg  10 mg Rectal DAILY PRN    acetaminophen (TYLENOL) tablet 650 mg  650 mg Oral Q6H    traMADol (ULTRAM) tablet 50 mg  50 mg Oral Q6H PRN    hydroxyzine HCL (ATARAX) tablet 10 mg  10 mg Oral Q8H PRN    enoxaparin (LOVENOX) injection 40 mg  40 mg SubCUTAneous Q24H     ______________________________________________________________________  EXPECTED LENGTH OF STAY: - - -  ACTUAL LENGTH OF STAY:          3                 Geovanni Alarcon MD

## 2020-01-26 NOTE — PROGRESS NOTES
Bedside and Verbal shift change report given to Alexis Cortez (oncoming nurse) by Ronit Arechiga RN (offgoing nurse). Report included the following information SBAR, Kardex and MAR.

## 2020-01-27 LAB
ERYTHROCYTE [DISTWIDTH] IN BLOOD BY AUTOMATED COUNT: 12.8 % (ref 11.5–14.5)
HCT VFR BLD AUTO: 30.1 % (ref 35–47)
HGB BLD-MCNC: 9.6 G/DL (ref 11.5–16)
MCH RBC QN AUTO: 27.2 PG (ref 26–34)
MCHC RBC AUTO-ENTMCNC: 31.9 G/DL (ref 30–36.5)
MCV RBC AUTO: 85.3 FL (ref 80–99)
NRBC # BLD: 0 K/UL (ref 0–0.01)
NRBC BLD-RTO: 0 PER 100 WBC
PLATELET # BLD AUTO: 157 K/UL (ref 150–400)
PMV BLD AUTO: 9.6 FL (ref 8.9–12.9)
RBC # BLD AUTO: 3.53 M/UL (ref 3.8–5.2)
WBC # BLD AUTO: 9 K/UL (ref 3.6–11)

## 2020-01-27 PROCEDURE — 74011250636 HC RX REV CODE- 250/636: Performed by: INTERNAL MEDICINE

## 2020-01-27 PROCEDURE — 74011250637 HC RX REV CODE- 250/637: Performed by: PHYSICIAN ASSISTANT

## 2020-01-27 PROCEDURE — 85027 COMPLETE CBC AUTOMATED: CPT

## 2020-01-27 PROCEDURE — 74011250636 HC RX REV CODE- 250/636: Performed by: HOSPITALIST

## 2020-01-27 PROCEDURE — 97530 THERAPEUTIC ACTIVITIES: CPT

## 2020-01-27 PROCEDURE — 65270000029 HC RM PRIVATE

## 2020-01-27 PROCEDURE — 36415 COLL VENOUS BLD VENIPUNCTURE: CPT

## 2020-01-27 RX ADMIN — OYSTER SHELL CALCIUM WITH VITAMIN D 1 TABLET: 500; 200 TABLET, FILM COATED ORAL at 07:00

## 2020-01-27 RX ADMIN — Medication 10 ML: at 06:43

## 2020-01-27 RX ADMIN — SENNOSIDES AND DOCUSATE SODIUM 1 TABLET: 8.6; 5 TABLET ORAL at 18:22

## 2020-01-27 RX ADMIN — SENNOSIDES AND DOCUSATE SODIUM 1 TABLET: 8.6; 5 TABLET ORAL at 10:11

## 2020-01-27 RX ADMIN — ACETAMINOPHEN 650 MG: 325 TABLET ORAL at 06:41

## 2020-01-27 RX ADMIN — OYSTER SHELL CALCIUM WITH VITAMIN D 1 TABLET: 500; 200 TABLET, FILM COATED ORAL at 12:47

## 2020-01-27 RX ADMIN — OYSTER SHELL CALCIUM WITH VITAMIN D 1 TABLET: 500; 200 TABLET, FILM COATED ORAL at 18:22

## 2020-01-27 RX ADMIN — ACETAMINOPHEN 650 MG: 325 TABLET ORAL at 12:47

## 2020-01-27 RX ADMIN — ENOXAPARIN SODIUM 40 MG: 40 INJECTION SUBCUTANEOUS at 12:47

## 2020-01-27 RX ADMIN — SODIUM CHLORIDE 50 ML/HR: 900 INJECTION, SOLUTION INTRAVENOUS at 18:24

## 2020-01-27 RX ADMIN — AMLODIPINE BESYLATE 5 MG: 5 TABLET ORAL at 10:11

## 2020-01-27 RX ADMIN — POLYETHYLENE GLYCOL 3350 17 G: 17 POWDER, FOR SOLUTION ORAL at 10:11

## 2020-01-27 RX ADMIN — ACETAMINOPHEN 650 MG: 325 TABLET ORAL at 18:22

## 2020-01-27 NOTE — PROGRESS NOTES
ABBE: Referral pending with [facility] and rehab (formerly known as [facility] and rehab). [facility] patient. Patient has hx of cerebral palsy, non-verbal. Sister Jarocho Hooker is caregiver/main contact #[phone]     Care Management Interventions  PCP Verified by CM: Yes  Mode of Transport at Discharge: BLS  Transition of Care Consult (CM Consult): Discharge Planning, SNF  MyChart Signup: No  Physical Therapy Consult: Yes  Occupational Therapy Consult: Yes  Speech Therapy Consult: No  Current Support Network: Has Personal Caregivers, Lives with Caregiver  The Plan for Transition of Care is Related to the Following Treatment Goals : SNF  The Patient and/or Patient Representative was Provided with a Choice of Provider and Agrees with the Discharge Plan?: Yes  Freedom of Choice List was Provided with Basic Dialogue that Supports the Patient's Individualized Plan of Care/Goals, Treatment Preferences and Shares the Quality Data Associated with the Providers?: Yes  Discharge Location  Discharge Placement: Home with home health     Reason for Admission:   Hip fracture                   RUR Score:   10%                  Plan for utilizing home health:  Patient's siste/caregiver is requesting SNF at [facility]. .                      Current Advanced Directive/Advance Care Plan: not on file                         Transition of Care Plan:      SNF/rehab                Chart reviewed. Noted patient has history of CP and is non-verbal. Patient's address is listed as being at [facility]. JOE spoke with Tuan Leggett at [address] who stated that this patient was not admitted from their facility. She was only at [facility] once back in 2017 for respite care. CM called Omar De La Cruz with [phone] #[phone] and left voicemail message. Awaiting response. CM noted contact for sister Gerson Charles [phone], CM called sister, she will be at [facility] in 20 minutes. CM met with patient's sister Cecy Delacruz.  Patient lives at home with sister. CM updated patient's home address in the hospital system to [place]. [facility]. Patient has aids through [facility] that come every morning and evening to assist with bathing and dressing. Patient's sister helps with light ADLs and feeding. Sister stated that patient was ambulating to the bathroom without assist prior to admission. Patient has a wheelchair that's at [facility] currently. Patient's  at [facility] is [place], per the sister. Sister is hopeful that patient can go to [facility]. CM sent referral via [employer]. CM will follow. CM spoke with [place] with [employer], they do not contract with [facility], however they do contract with [employer] ([facility] and [facility]). CM spoke with sister, she would like a referral sent to [facility]. CM sent referral.    CM offered screening for Medicaid Long-Term Services & Supports. Sister consented to screening.     LELA MoralesW/CRM

## 2020-01-27 NOTE — PROGRESS NOTES
Problem: Mobility Impaired (Adult and Pediatric)  Goal: *Acute Goals and Plan of Care (Insert Text)  Description  FUNCTIONAL STATUS PRIOR TO ADMISSION: Pt non-verbal and unable to provide history. Per chart, pt lives in nursing facility where she ambulated short distances with unsteady gait, used w/c for increased distance    [address]: The patient lived in [address] [place] [address]  Initiated 1/25/2020  1. Patient will move from supine to sit and sit to supine  in bed with moderate assistance  within 7 day(s). 2.  Patient will transfer from bed to chair and chair to bed with mod assistance using the least restrictive device within 7 day(s). 3.  Patient will perform sit to stand with moderate assistance  within 7 day(s). 4.  Patient will ambulate with moderate assistance  for 5 feet with the least restrictive device within 7 day(s). Outcome: Progressing Towards Goal   PHYSICAL THERAPY TREATMENT  Patient: Jose Sorto (51 y.o. female)  Date: 1/27/2020  Diagnosis: Hip fracture ([facility] [S72.009A]   Hip fracture ([facility]  Procedure(s) (LRB):  RIGHT HIP HEMIARTHROPLASTY (Right) 3 Days Post-Op  Precautions: Fall, WBAT, Seizure(nonverbal)  Chart, physical therapy assessment, plan of care and goals were reviewed. ASSESSMENT  Patient continues with skilled PT services and is progressing towards goals. She was able to sit for a longer period today, but still indicating pain in the R hip with sitting and was not safe to attempt standing today. Discussed with sister, who is primary caregiver. She was able to ambulate and going to a day program through [facility] during the week. Patient did not use any assistive device for short distance walking, but she does have a wheelchair for longer distances and for use at [facility].   We will progress to OOB to chair tomorrow if she is able to tolerate it from a safety standpoint, but per sister, she frequently gets up on her own at home.  Recommend SNF level rehab when medically ready. Current Level of Function Impacting Discharge (mobility/balance): mod to max assist for bed mobility    Other factors to consider for discharge: nonverbal, but was ambulatory prior to admission         PLAN :  Patient continues to benefit from skilled intervention to address the above impairments. Continue treatment per established plan of care. to address goals. Recommendation for discharge: (in order for the patient to meet his/her long term goals)  Therapy up to 5 days/week in SNF setting    This discharge recommendation:  Has been made in collaboration with the attending provider and/or case management    IF patient discharges home will need the following DME: to be determined (TBD)       SUBJECTIVE:   Patient stated noneverbal.    OBJECTIVE DATA SUMMARY:   Critical Behavior:  Neurologic State: Alert  Orientation Level: Unable to verbalize  Cognition: Impaired decision making  Safety/Judgement: Lack of insight into deficits  Functional Mobility Training:  Bed Mobility:  Rolling: Maximum assistance  Supine to Sit: Moderate assistance  Sit to Supine: Total assistance  Scooting: Maximum assistance(patient did make some effort to assist with LLE)        Transfers:  Sit to Stand: (unsafe to attempt)                                Balance:  Sitting: Impaired; With support  Sitting - Static: Fair (occasional)(initially needed constant support)  Sitting - Dynamic: Fair (occasional)  Standing: (unable to attempt secondary to indication of pain R hip)  Ambulation/Gait Training:                                                        Stairs: Therapeutic Exercises:   Sitting balance  Pain Rating:  Grimaced in sitting    Activity Tolerance:   Fair  Please refer to the flowsheet for vital signs taken during this treatment.     After treatment patient left in no apparent distress:   Call bell within reach, Bed / chair alarm activated, Caregiver / family present, Side rails x 3, and bed in chair position     COMMUNICATION/COLLABORATION:   The patients plan of care was discussed with: Registered Nurse and     Daisy Kuhn, PT   Time Calculation: 23 mins

## 2020-01-27 NOTE — PROGRESS NOTES
[facility] [facility] Adult  Hospitalist Group                                                                                          Hospitalist Progress Note  Gomez Mejia MD  Answering service: [phone] OR [phone] from in house phone        Date of Service:  2020  NAME:  Silvia Lira  :  1945  MRN:  [id number]      Admission Summary:   Silvia Lira is a 69 y.o. female who has a history of epilepsy and cerebral palsy and is presenting from a facility [facility] with hip pain. Patient is not able to give me history as she is nonverbal.  It was patient sister who told the EMS that the patient is complaining of pain and difficulty walking because of her the right hip nobody knows that if she had fallen. She was sent here by the nursing home because they did a right hip fracture after the complaint but the sister ended noticed that there was hip fracture on the right side. She was sent to [address] [place] [address] for the same    Interval history / Subjective:    F/u right proximal femur fracture  No new issues resting in bed  Seems pain controlled  Will need rehab placement. Discussed with RN will discuss with case management     Assessment & Plan:     Subcapital right proximal femur fracture. Status post right hip hemiarthroplasty by Ortho on 2020.  -management per Ortho, appreciate discussion with Ortho team  -PT/OT    epilepsy  Continue Keppra     Hypertension  Hold amlodipine     cerebral palsy  -supportive care    PT/OT SNF      Code status: full   DVT prophylaxis: Lovenox    Plan: SNF    Care Plan discussed with: Patient/Family  Disposition: TBD     Hospital Problems  Date Reviewed: 2020          Codes Class Noted POA    * (Principal) Hip fracture ([facility] ICD-10-CM: V08.972G  ICD-9-CM: 820.8  2020 Yes                Review of Systems:   A comprehensive review of systems was negative except for that written in the HPI.        Vital Signs:    Last 24hrs VS reviewed since prior progress note. Most recent are:  Visit Vitals  /70 (BP 1 Location: Left arm, BP Patient Position: At rest)   Pulse 85   Temp 97.6 °F (36.4 °C)   Resp 16   Ht 5' (1.524 m)   Wt 57 kg (125 lb 10.6 oz)   SpO2 97%   BMI 24.54 kg/m²         Intake/Output Summary (Last 24 hours) at 1/27/2020 1025  Last data filed at 1/27/2020 0451  Gross per 24 hour   Intake    Output 1350 ml   Net -1350 ml        Physical Examination:             Constitutional:  non verbal and drowsy    ENT:  Oral mucous moist, oropharynx benign. Resp:  CTA bilaterally. No wheezing/rhonchi/rales. No accessory muscle use   CV:  Regular rhythm, normal rate, no murmurs, gallops, rubs    GI:  Soft, non distended, non tender. normoactive bowel sounds, no hepatosplenomegaly     Musculoskeletal:  No edema, warm, 2+ pulses throughout    Neurologic: Contractures            Data Review:    Review and/or order of clinical lab test      Labs:     Recent Labs     01/27/20  0221 01/26/20  0321   WBC 9.0  --    HGB 9.6* 10.2*   HCT 30.1*  --      --      Recent Labs     01/25/20  0407      K 4.3   *   CO2 20*   BUN 11   CREA 0.65   *   CA 8.6     No results for input(s): SGOT, GPT, ALT, AP, TBIL, TBILI, TP, ALB, GLOB, GGT, AML, LPSE in the last 72 hours. No lab exists for component: AMYP, HLPSE  No results for input(s): INR, PTP, APTT, INREXT, INREXT in the last 72 hours. No results for input(s): FE, TIBC, PSAT, FERR in the last 72 hours. No results found for: FOL, RBCF   No results for input(s): PH, PCO2, PO2 in the last 72 hours. No results for input(s): CPK, CKNDX, TROIQ in the last 72 hours.     No lab exists for component: CPKMB  No results found for: CHOL, CHOLX, CHLST, CHOLV, HDL, HDLP, LDL, LDLC, DLDLP, TGLX, TRIGL, TRIGP, CHHD, CHHDX  No results found for: [facility]  Lab Results   Component Value Date/Time    Color DARK YELLOW 01/23/2020 02:30 PM    Appearance CLEAR 01/23/2020 02:30 PM    Specific gravity 1.027 01/23/2020 02:30 PM    Specific gravity 1.024 02/22/2017 09:20 AM    pH (UA) 5.5 01/23/2020 02:30 PM    Protein TRACE (A) 01/23/2020 02:30 PM    Glucose NEGATIVE  01/23/2020 02:30 PM    Ketone NEGATIVE  01/23/2020 02:30 PM    Bilirubin NEGATIVE  01/23/2020 02:30 PM    Urobilinogen 2.0 (H) 01/23/2020 02:30 PM    Nitrites NEGATIVE  01/23/2020 02:30 PM    Leukocyte Esterase TRACE (A) 01/23/2020 02:30 PM    Epithelial cells FEW 01/23/2020 02:30 PM    Bacteria 1+ (A) 01/23/2020 02:30 PM    WBC 0-4 01/23/2020 02:30 PM    RBC 0-5 01/23/2020 02:30 PM         Medications Reviewed:     Current Facility-Administered Medications   Medication Dose Route Frequency    0.9% sodium chloride infusion  50 mL/hr IntraVENous CONTINUOUS    amLODIPine (NORVASC) tablet 5 mg  5 mg Oral DAILY    loratadine (CLARITIN) tablet 10 mg  10 mg Oral DAILY PRN    sodium chloride (NS) flush 5-40 mL  5-40 mL IntraVENous Q8H    sodium chloride (NS) flush 5-40 mL  5-40 mL IntraVENous PRN    naloxone (NARCAN) injection 0.4 mg  0.4 mg IntraVENous PRN    calcium-vitamin D (OS-BROOKLYN) 500 mg-200 unit tablet  1 Tab Oral TID WITH MEALS    senna-docusate (PERICOLACE) 8.6-50 mg per tablet 1 Tab  1 Tab Oral BID    polyethylene glycol (MIRALAX) packet 17 g  17 g Oral DAILY    bisacodyL (DULCOLAX) suppository 10 mg  10 mg Rectal DAILY PRN    acetaminophen (TYLENOL) tablet 650 mg  650 mg Oral Q6H    traMADol (ULTRAM) tablet 50 mg  50 mg Oral Q6H PRN    hydroxyzine HCL (ATARAX) tablet 10 mg  10 mg Oral Q8H PRN    enoxaparin (LOVENOX) injection 40 mg  40 mg SubCUTAneous Q24H     ______________________________________________________________________  EXPECTED LENGTH OF STAY: - - -  ACTUAL LENGTH OF STAY:          4                 Melissa Parkinson MD

## 2020-01-27 NOTE — PROGRESS NOTES
NUTRITION COMPLETE ASSESSMENT    RECOMMENDATIONS:   1. RD downgrade diet to pureed  2. Staff/family feed and encourage/assist to take PO fluids throughout day  3. RD add Ensure Compact TID     Interventions/Plan:   Food/Nutrient Delivery:  Modify diet/texture/consistency/nutrients(RD downgrade to pureed) Commercial supplement(Ensure Compact TID) Feeding assistance at [facility]) (Rx Oscal D;  Rx Decara PTA)    Nutrition Education:     Coordination of Care:    Nutrition Counseling:        Assessment:   Reason for Assessment:   [x] Provider Consult: Geriatric hip fx    Diet: Puree(RD downgraded texture to puree)  Supplements: RD add Ensure Compact TID  Nutritionally Significant Medications: [x] Reviewed & Includes: Oscal D, Miralax, Pericolace  Meal Intake: No data found. Subjective:  Pt non-verbal;  Per RN not drinking much; PA also concerned for poor fluid intake. Hx UTIs. No family at bedside. Lives with sister with [facility]. Per family/OT able to feed self with spoon at baseline, but also fed since eats so slowly. Objective:  Past Medical History:   Diagnosis Date    Epilepsy ([facility]     Gait instability     Hypertension     Neurological disorder     Seizures (HCC)     UTI (urinary tract infection)     Vitamin D deficiency      Vitamin D deficiency w/Rx Decara PTA; Rx Oscal D now. Admit R-hip fx, s/p R-hemiarthroplasty (1/24/2020). BMI 24 WNL however, ? UBW or recent wt trends. No family present and no recent prior encounters for trend. Admit wt ~126#, last recorded wt 130# (2/22/2017). Pt has order for \"Ensure\" BID from [facility]. Currently not self-initiating drinking/eating. RD offered Ensure Enlive and accepted very small sips, then gave-up after ~2 Oz. RD to add smaller more nutrient-dense Ensure Compact TID = 660 kcal and 27 gm pro and will need sips/bites throughout day to meet needs. Total Feeder with poor appetite/intake. IV running at 50 mL/hr for fluids. Edentulours and initial diet dental soft. RD downgraded to pureed. ? Diet type PTA. ? Diet preferences/tolerances. RD passing meds with ice cream which is accepted well. Last BM 1/24/2020 with Rx Miralax and Pericolace. Noted Rx Diocto BID PTA via [facility]. Estimated Nutrition Needs:   Kcals/day: 1200 Kcals/day  Protein: 70 g(1.2 gm/kg)  Fluid: 1200 ml(Minimum 1 mL/kcal)     Based On: Mey Villarreal(MSJ x 1.2)  Weight Used: Actual wt(57 kg)    Pt expected to meet estimated nutrient needs: [x]  No, not without feeding, ONS and appetite improvement. Comparative Standards:  ;  ;      Nutrition Diagnosis:   1. Inadequate oral intake related to mentation, dentition as evidenced by non-verbal; edentulous, poor PO intake, does not self-inititate eating or drinking; IV for fluids; Ensure BID PTA. 2. Increased nutrient needs related to Vitamin D, protein as evidenced by Hx Vitamin D deficiency with Rx Decara PT and increased protein needs for geriatric surgical healing with poor appetite. Goals:     Consume 100% ONS minimum BID and minimum 50% all meals through discharge.       Monitoring & Evaluation:    - Oral fluids amount, Liquid meal replacement, Protein intake, Vitamin intake, IV fluids, Total energy intake   - Weight/weight change, Lean body mass, fat free mass, Vitamin profile, GI   - Acceptance of assist with eating    Previous Nutrition Goals Met:  N/A  Previous Recommendations:      N/A    Education & Discharge Needs:   [x] None Identified: Pt non-verbal; neurological    [x] Participated in care plan, discharge planning, and/or interdisciplinary rounds        Cultural, Anglican and ethnic food preferences identified:    N/A    Skin Integrity: [x]Intact R-hip incision   Edema: [x]None   Last BM: 1/24/2020  Food Allergies: [x]NKA    Anthropometrics:    Weight Loss Metrics 1/26/2020 2/22/2017   Today's Wt 125 lb 10.6 oz 130 lb   BMI 24.54 kg/m2 25.39 kg/m2      Last 3 Recorded Weights in this Encounter    01/24/20 1041 01/26/20 0752   Weight: 50.9 kg (112 lb 3.4 oz) 57 kg (125 lb 10.6 oz)      Weight Source: Bed  Height: 5' (152.4 cm), Height Source: (ED prior encounter 2/22/2017)  Body mass index is 24.54 kg/m². IBW : 45.4 kg (100 lb),    Usual Body Weight: (Unknown; Last wt 130# (2/22/2017)),      Labs:    Lab Results   Component Value Date/Time    Sodium 141 01/25/2020 04:07 AM    Potassium 4.3 01/25/2020 04:07 AM    Chloride 113 (H) 01/25/2020 04:07 AM    CO2 20 (L) 01/25/2020 04:07 AM    Glucose 111 (H) 01/25/2020 04:07 AM    BUN 11 01/25/2020 04:07 AM    Creatinine 0.65 01/25/2020 04:07 AM    Calcium 8.6 01/25/2020 04:07 AM    Albumin 3.1 (L) 01/24/2020 03:25 AM     No results found for: HBA1C, HGBE8, YYQ2RBZL, RQL5GMSX  Lab Results   Component Value Date/Time    Glucose 111 (H) 01/25/2020 04:07 AM      Lab Results   Component Value Date/Time    ALT (SGPT) 22 01/24/2020 03:25 AM    AST (SGOT) 24 01/24/2020 03:25 AM    Alk.  phosphatase 65 01/24/2020 03:25 AM    Bilirubin, total 0.8 01/24/2020 03:25 AM        Greer Hewitt RD

## 2020-01-27 NOTE — PROGRESS NOTES
ORTHO PROGRESS NOTE      SUBJECTIVE:  Leisa Hatch does not appear to have pain. OBJECTIVE:  Patient Vitals for the past 24 hrs:   Temp Pulse BP   01/27/20 0818 97.6 °F (36.4 °C) 85 121/70   01/27/20 0210 98.1 °F (36.7 °C) 83 152/87   01/26/20 2005 99.6 °F (37.6 °C) 96 127/80   01/26/20 1430 98.9 °F (37.2 °C) 84 134/89       Awake, non-verbal, no distress. Lying in bed. No family present. Respirations unlabored. Dressing has contained bloody drainage. Adhesive from her diaper has torn outer layer of aquacel. Thigh swollen but soft. Recent Labs     01/27/20  0221  01/25/20  0407   HGB 9.6*   < > 9.5*   HCT 30.1*  --   --      --   --    BUN  --   --  11   CREA  --   --  0.65   GFRAA  --   --  >60   GFRNA  --   --  >60    < > = values in this interval not displayed. ASSESSMENT:  Procedure: Procedure(s):  RIGHT HIP HEMIARTHROPLASTY  Post Op day: 3 Days Post-Op      PLAN:  I reinforced torn aquacel with tegaderm. PT/OT: WBAT with walker. Analgesics: Tylenol  DVT proph: lovenox  Disp planning. F/U: 3-4 weeks Dr. Cody Smith.     ANITRA Edward  Orthopedic Trauma Service  [facility]

## 2020-01-27 NOTE — PROGRESS NOTES
Bedside and Verbal shift change report given to Cindy Powell (oncoming nurse) by Josee Hu (offgoing nurse). Report included the following information SBAR.

## 2020-01-28 LAB
ANION GAP SERPL CALC-SCNC: 8 MMOL/L (ref 5–15)
BUN SERPL-MCNC: 8 MG/DL (ref 6–20)
BUN/CREAT SERPL: 15 (ref 12–20)
CALCIUM SERPL-MCNC: 8.7 MG/DL (ref 8.5–10.1)
CHLORIDE SERPL-SCNC: 107 MMOL/L (ref 97–108)
CO2 SERPL-SCNC: 23 MMOL/L (ref 21–32)
CREAT SERPL-MCNC: 0.54 MG/DL (ref 0.55–1.02)
ERYTHROCYTE [DISTWIDTH] IN BLOOD BY AUTOMATED COUNT: 12.6 % (ref 11.5–14.5)
GLUCOSE SERPL-MCNC: 111 MG/DL (ref 65–100)
HCT VFR BLD AUTO: 29.1 % (ref 35–47)
HGB BLD-MCNC: 9.5 G/DL (ref 11.5–16)
MCH RBC QN AUTO: 27.3 PG (ref 26–34)
MCHC RBC AUTO-ENTMCNC: 32.6 G/DL (ref 30–36.5)
MCV RBC AUTO: 83.6 FL (ref 80–99)
NRBC # BLD: 0 K/UL (ref 0–0.01)
NRBC BLD-RTO: 0 PER 100 WBC
PLATELET # BLD AUTO: 194 K/UL (ref 150–400)
PMV BLD AUTO: 9.1 FL (ref 8.9–12.9)
POTASSIUM SERPL-SCNC: 3.1 MMOL/L (ref 3.5–5.1)
RBC # BLD AUTO: 3.48 M/UL (ref 3.8–5.2)
SODIUM SERPL-SCNC: 138 MMOL/L (ref 136–145)
WBC # BLD AUTO: 7.6 K/UL (ref 3.6–11)

## 2020-01-28 PROCEDURE — 36415 COLL VENOUS BLD VENIPUNCTURE: CPT

## 2020-01-28 PROCEDURE — 65270000029 HC RM PRIVATE

## 2020-01-28 PROCEDURE — 74011250636 HC RX REV CODE- 250/636: Performed by: INTERNAL MEDICINE

## 2020-01-28 PROCEDURE — 74011000258 HC RX REV CODE- 258: Performed by: HOSPITALIST

## 2020-01-28 PROCEDURE — 85027 COMPLETE CBC AUTOMATED: CPT

## 2020-01-28 PROCEDURE — 74011250637 HC RX REV CODE- 250/637: Performed by: PHYSICIAN ASSISTANT

## 2020-01-28 PROCEDURE — 74011250636 HC RX REV CODE- 250/636: Performed by: HOSPITALIST

## 2020-01-28 PROCEDURE — 80048 BASIC METABOLIC PNL TOTAL CA: CPT

## 2020-01-28 PROCEDURE — 97535 SELF CARE MNGMENT TRAINING: CPT

## 2020-01-28 RX ORDER — LEVETIRACETAM 500 MG/1
1500 TABLET ORAL DAILY
Status: DISCONTINUED | OUTPATIENT
Start: 2020-01-28 | End: 2020-01-28

## 2020-01-28 RX ADMIN — ENOXAPARIN SODIUM 40 MG: 40 INJECTION SUBCUTANEOUS at 12:23

## 2020-01-28 RX ADMIN — SODIUM CHLORIDE 50 ML/HR: 900 INJECTION, SOLUTION INTRAVENOUS at 16:09

## 2020-01-28 RX ADMIN — Medication 10 ML: at 16:11

## 2020-01-28 RX ADMIN — LEVETIRACETAM 1500 MG: 100 INJECTION, SOLUTION INTRAVENOUS at 12:13

## 2020-01-28 NOTE — PROGRESS NOTES
Speech pathology note  Patient refusing all PO intake and oral meds. Attempted to see patient for swallowing evaluation, however patient turned her head away and put her hand up toward her mouth to refuse all PO intake despite offering multiple different foods. Unfortunately, nothing for SLP to add as patient is refusing all PO. If there is concern for dysphagia when patient accepts PO intake, please re-consult. Thank you.     Blossom Lam., CCC-SLP

## 2020-01-28 NOTE — PROGRESS NOTES
Bedside and Verbal shift change report given to [facility] FABIÁN WARREN (oncoming nurse) by Maryann Clark (offgoing nurse). Report included the following information SBAR, Kardex, Procedure Summary, Intake/Output, MAR and Recent Results.

## 2020-01-28 NOTE — PROGRESS NOTES
Spoke to Luna Peoples NP about patient not receiving Keppra for the past couple of days. He stated we would discuss with primary team in the morning.

## 2020-01-28 NOTE — PROGRESS NOTES
Spoke with Dr. Nya Houston this am. Reported that patient's potassium is 3.1 and patient is non verbal and is refusing oral meds and refusing to eat or drink. Called patient's sister, who comes in and assists patient with eating and taking medications. Sister stated she would not be able to come in today because her car is broken down. She stated that the patient would not eat anything yesterday but took her medications. Reported to Dr. Nya Houston that patient also takes keppra for seizures and medication had not been restarted. Dr. Nya Houston assessed patient and ordered needed medications IV including Keppra. Continuing to monitor. No other orders at this time.

## 2020-01-28 NOTE — PROGRESS NOTES
Ortho Daily Progress Note      Patient: Amanda Bach                   MRN: [id number]  Sex: female  YOB: 1945           Age: 69 y.o.    4 Days Post-Op    Procedure(s): RIGHT HIP HEMIARTHROPLASTY     Visit Vitals  /62 (BP 1 Location: Left arm)   Pulse 95   Temp 97.9 °F (36.6 °C)   Resp 16   Ht 5' (1.524 m)   Wt 59.5 kg (131 lb 1.6 oz)   SpO2 96%   BMI 25.60 kg/m²        Lab Results:  HGB   Date/Time Value Ref Range Status   01/28/2020 02:03 AM 9.5 (L) 11.5 - 16.0 g/dL Final     INR   Date/Time Value Ref Range Status   01/23/2020 04:31 PM 1.0 0.9 - 1.1   Final     Comment:     A single therapeutic range for Vit K antagonists may not be optimal for all indications - see June, 2008 issue of Chest, American College of Chest Physicians Evidence-Based Clinical Practice Guidelines, 8th Edition. Physical Exam:    GENERAL: no distress  DRESSING: Aquacel in place right hip  SWELLING: mild  PULSE: intact  MOTION: actively flexing/extending/rotating hip on her own without obvious discomfort        Plan:    DVT prophylaxis: Lovenox injections 40mg sub q once daily until 21 days from surgery, then take EC aspirin 81 mg twice daily for 2 additional weeks.   Weight bearing restrictionWBAT  Pain Control:stable, mild-to-moderate joint symptoms intermittently, reasonably well controlled by current meds  Dispo: lives with sister, plan for [address]  1/28/2020   3:03 PM      .

## 2020-01-28 NOTE — PROGRESS NOTES
Patient continuing to refuse to eat and take any medications. Attempted a couple time with other staff and she still would not eat or take her meds.

## 2020-01-28 NOTE — PROGRESS NOTES
Bedside and Verbal shift change report given to Alena Bennett RN (oncoming nurse) by Kayleen Mejia RN (offgoing nurse). Report included the following information SBAR, Kardex, Procedure Summary, Intake/Output, MAR, Accordion and Recent Results.

## 2020-01-28 NOTE — PROGRESS NOTES
[facility]  Hospitalist Group                                                                                          Hospitalist Progress Note  Stella Kapoor MD  Answering service: [phone] from in house phone        Date of Service:  2020  NAME:  Idania Blandon  :  1945  MRN:  [id number]      Admission Summary:   Idania Blandon is a 69 y.o. female who has a history of epilepsy and cerebral palsy and is presenting from a facility [facility] with hip pain. Patient is not able to give me history as she is nonverbal.  It was patient sister who told the EMS that the patient is complaining of pain and difficulty walking because of her the right hip nobody knows that if she had fallen. She was sent here by the nursing home because they did a right hip fracture after the complaint but the sister ended noticed that there was hip fracture on the right side. She was sent to [facility] for the same    Interval history / Subjective:    F/u right proximal femur fracture    Patient has cerebral palsy, per chart review -she is non verbal at baseline. Per RN,she was refusing meds and food today       Assessment & Plan:     Subcapital right proximal femur fracture.   Status post right hip hemiarthroplasty by Ortho on 2020.  -management per Ortho, appreciate discussion with Ortho team  -PT/OT  -lovenox for dvt prophylaxis    epilepsy  Continue Keppra-change to IV as patient refusing meds this morning     Hypertension:  Hold amlodipine,BP wnl     cerebral palsy  -supportive care    PT/OT SNF      Code status: full   DVT prophylaxis: Lovenox    Plan: SNF    Care Plan discussed with: nurse  Disposition: TBD     Hospital Problems  Date Reviewed: 2020          Codes Class Noted POA    * (Principal) Hip fracture ([facility] [address] ICD-10-CM: I04.337R  ICD-9-CM: 820.8  2020 Yes                Review of Systems:   A comprehensive review of systems was negative except for that written in the HPI. Vital Signs:    Last 24hrs VS reviewed since prior progress note. Most recent are:  Visit Vitals  /72   Pulse 86   Temp 98.2 °F (36.8 °C)   Resp 16   Ht 5' (1.524 m)   Wt 59.5 kg (131 lb 1.6 oz)   SpO2 95%   BMI 25.60 kg/m²         Intake/Output Summary (Last 24 hours) at 1/28/2020 1229  Last data filed at 1/28/2020 0500  Gross per 24 hour   Intake    Output 850 ml   Net -850 ml        Physical Examination:             Constitutional:  non verbal,elderly patient    ENT:  Oral mucous moist, oropharynx benign. EOMI,normal conjunctva   Resp:  CTA bilaterally. No wheezing/rhonchi/rales. No accessory muscle use   CV:  Regular rhythm, normal rate    GI:  Soft, non distended, non tender. normoactive bowel sounds, no hepatosplenomegaly     Musculoskeletal:  contractures extremities    Neurologic: Awake,moves extremities spontaneously,does not follow commands           Data Review:    Review and/or order of clinical lab test      Labs:     Recent Labs     01/28/20  0203 01/27/20  0221   WBC 7.6 9.0   HGB 9.5* 9.6*   HCT 29.1* 30.1*    157     Recent Labs     01/28/20  0203      K 3.1*      CO2 23   BUN 8   CREA 0.54*   *   CA 8.7     No results for input(s): SGOT, GPT, ALT, AP, TBIL, TBILI, TP, ALB, GLOB, GGT, AML, LPSE in the last 72 hours. No lab exists for component: AMYP, HLPSE  No results for input(s): INR, PTP, APTT, INREXT, INREXT in the last 72 hours. No results for input(s): FE, TIBC, PSAT, FERR in the last 72 hours. No results found for: FOL, RBCF   No results for input(s): PH, PCO2, PO2 in the last 72 hours. No results for input(s): CPK, CKNDX, TROIQ in the last 72 hours.     No lab exists for component: CPKMB  No results found for: CHOL, CHOLX, CHLST, CHOLV, HDL, HDLP, LDL, LDLC, DLDLP, TGLX, TRIGL, TRIGP, CHHD, CHHDX  No results found for: [facility]  Lab Results   Component Value Date/Time    Color DARK YELLOW 01/23/2020 02:30 PM    Appearance CLEAR 01/23/2020 02:30 PM    Specific gravity 1.027 01/23/2020 02:30 PM    Specific gravity 1.024 02/22/2017 09:20 AM    pH (UA) 5.5 01/23/2020 02:30 PM    Protein TRACE (A) 01/23/2020 02:30 PM    Glucose NEGATIVE  01/23/2020 02:30 PM    Ketone NEGATIVE  01/23/2020 02:30 PM    Bilirubin NEGATIVE  01/23/2020 02:30 PM    Urobilinogen 2.0 (H) 01/23/2020 02:30 PM    Nitrites NEGATIVE  01/23/2020 02:30 PM    Leukocyte Esterase TRACE (A) 01/23/2020 02:30 PM    Epithelial cells FEW 01/23/2020 02:30 PM    Bacteria 1+ (A) 01/23/2020 02:30 PM    WBC 0-4 01/23/2020 02:30 PM    RBC 0-5 01/23/2020 02:30 PM         Medications Reviewed:     Current Facility-Administered Medications   Medication Dose Route Frequency    levETIRAcetam (KEPPRA) 1,500 mg in 0.9% sodium chloride 100 mL IVPB  1,500 mg IntraVENous DAILY    0.9% sodium chloride infusion  50 mL/hr IntraVENous CONTINUOUS    loratadine (CLARITIN) tablet 10 mg  10 mg Oral DAILY PRN    sodium chloride (NS) flush 5-40 mL  5-40 mL IntraVENous Q8H    sodium chloride (NS) flush 5-40 mL  5-40 mL IntraVENous PRN    naloxone (NARCAN) injection 0.4 mg  0.4 mg IntraVENous PRN    calcium-vitamin D (OS-BROOKLYN) 500 mg-200 unit tablet  1 Tab Oral TID WITH MEALS    senna-docusate (PERICOLACE) 8.6-50 mg per tablet 1 Tab  1 Tab Oral BID    polyethylene glycol (MIRALAX) packet 17 g  17 g Oral DAILY    bisacodyL (DULCOLAX) suppository 10 mg  10 mg Rectal DAILY PRN    acetaminophen (TYLENOL) tablet 650 mg  650 mg Oral Q6H    traMADol (ULTRAM) tablet 50 mg  50 mg Oral Q6H PRN    hydroxyzine HCL (ATARAX) tablet 10 mg  10 mg Oral Q8H PRN    enoxaparin (LOVENOX) injection 40 mg  40 mg SubCUTAneous Q24H     ______________________________________________________________________  EXPECTED LENGTH OF STAY: - - -  ACTUAL LENGTH OF STAY:          5                 Vannessa Evans MD

## 2020-01-28 NOTE — PROGRESS NOTES
Problem: Self Care Deficits Care Plan (Adult)  Goal: *Acute Goals and Plan of Care (Insert Text)  Description  Occupational Therapy Goals  Initiated: 1/26/2020   1. Patient will perform grooming with min A sitting in chair/chair position in bed within 7 day(s). 2.  Patient will perform upper body dressing with min A within 7 days. 3.  Patient will perform self feeding for 25% of her meals within 7 day(s). 4.  Patient will perform toilet transfers with mod A within 7 day(s). 5.  Patient will perform all aspects of toileting with mod A within 7 day(s). FUNCTIONAL STATUS PRIOR TO ADMISSION: Pt lives with her sister Brent Appiah ([phone]). Contacted sister this morning to provided PLOF as chart was confusing. Patient lives with her sister and [facility] support at home in the morning time [facility] Pt is minimally ambulatory but does proper her w/c independently using her feet. She does ambulate in the home short distances. She is able to assist with upper body dressing but is total for LB ADL. She is total A for bathing activities. She does transfer to and from commode with supervision to min A. She goes to day care 5 days per week. She is nonverbal at baseline. She has a ramp on her home and does not require to climb stairs. All meals are provided by family and facility. She eats only using a spoon. At times, she is assisted with feeding due to time constraints for  in the mornings but she is able to feed herself. She only has a w/c at home and no other functioning equipment.      HOME SUPPORT: see above     Outcome: Not Progressing Towards Goal     Problem: Patient Education: Go to Patient Education Activity  Goal: Patient/Family Education  Outcome: Not Progressing Towards Goal   OCCUPATIONAL THERAPY TREATMENT  Patient: Alysha Blake (56 y.o. female)  Date: 1/28/2020  Diagnosis: [facility] [S72.009A]   [facility]  Procedure(s) (LRB):  RIGHT HIP HEMIARTHROPLASTY (Right) 4 Days Post-Op  Precautions: Fall, WBAT, Seizure(nonverbal)  Chart, occupational therapy assessment, plan of care, and goals were reviewed. ASSESSMENT  Patient continues with skilled OT services and is not progressing towards goals. Patient received in fetal position in bed. Repositioned to head of bed with assist of nurse, but resistant to moving out of fetal position on her right side. Patient alert. Participation impacted by impaired command following, impaired AROM of bilateral UEs with noted joint deformities of bilateral hands, need for total assist for self care, patient not eating. Current Level of Function Impacting Discharge (ADLs): total assist for all self care    Other factors to consider for discharge: ambulatory at baseline         PLAN :  Patient continues to benefit from skilled intervention to address the above impairments. Continue treatment per established plan of care. to address goals.     Recommend with staff: encourage to participate in self feeding and self care activities    Recommend next OT session: EOB self care activity    Recommendation for discharge: (in order for the patient to meet his/her long term goals)  Therapy up to 5 days/week in SNF setting unless family is able to care for her at home in her familiar environment    This discharge recommendation:  Has been made in collaboration with the attending provider and/or case management    IF patient discharges home will need the following DME: bedside commode, walker: rollator, and wheelchair       SUBJECTIVE:   Patient stated: Patient is nonverbal    OBJECTIVE DATA SUMMARY:   Cognitive/Behavioral Status:  Neurologic State: Alert  Orientation Level: Unable to verbalize  Cognition: No command following  Perception: (unable to assess)  Perseveration: No perseveration noted  Safety/Judgement: Decreased awareness of environment    Functional Mobility and Transfers for ADLs:  Bed Mobility:  Scooting: Total assistance;Assist x2(to HOB)      ADL Intervention:  Feeding  Feeding Assistance: (chart reflects and nurse reports patient has no been eating)    Grooming  Washing Face: Total assistance (dependent)  Washing Hands: Total assistance (dependent)          Toileting  Bladder Hygiene: Total assistance (dependent)(pure wick)    Cognitive Retraining  Safety/Judgement: Decreased awareness of environment      Activity Tolerance:   Fair  Please refer to the flowsheet for vital signs taken during this treatment.     After treatment patient left in no apparent distress:   Supine in bed, Call bell within reach, and Bed / chair alarm activated    COMMUNICATION/COLLABORATION:   The patients plan of care was discussed with: Occupational Therapist and Registered Nurse    RANCHO Hobson  Time Calculation: 10 mins

## 2020-01-28 NOTE — PROGRESS NOTES
ABBE: Discharge to [address] once Level II screening has been completed. Request for Level II screening has been faxed to [facility]. CM submitted [facility] for processing. This patient is a [facility] patient. [facility] will provide SNF auth and will arrange discharge transportation. Patient has been accepted at [facility]. CM submitted [facility] for processing. Patient needs a Level II screening. CM faxed [facility] to [facility] at #[phone]. CM called Pamela ,  with [facility]. He is aware that a Level II must be completed prior to discharge. Ciera Bee will be the one to obtain the insurance auth for SNF and arrange transportation with [facility] at discharge. Care management will follow. 2:00 PM: CM received call from Michael Brown with [facility], #[phone],  they received the Level II paperwork.     BROOKE Pennington/LENKA

## 2020-01-29 PROCEDURE — 74011250636 HC RX REV CODE- 250/636: Performed by: HOSPITALIST

## 2020-01-29 PROCEDURE — 97530 THERAPEUTIC ACTIVITIES: CPT

## 2020-01-29 PROCEDURE — 74011250636 HC RX REV CODE- 250/636: Performed by: INTERNAL MEDICINE

## 2020-01-29 PROCEDURE — 77030038269 HC DRN EXT URIN PURWCK BARD -A

## 2020-01-29 PROCEDURE — 74011000258 HC RX REV CODE- 258: Performed by: HOSPITALIST

## 2020-01-29 PROCEDURE — 74011250637 HC RX REV CODE- 250/637: Performed by: PHYSICIAN ASSISTANT

## 2020-01-29 PROCEDURE — 74011250637 HC RX REV CODE- 250/637: Performed by: INTERNAL MEDICINE

## 2020-01-29 PROCEDURE — 65270000029 HC RM PRIVATE

## 2020-01-29 RX ADMIN — ACETAMINOPHEN 650 MG: 650 SOLUTION ORAL at 17:45

## 2020-01-29 RX ADMIN — SODIUM CHLORIDE 50 ML/HR: 900 INJECTION, SOLUTION INTRAVENOUS at 16:50

## 2020-01-29 RX ADMIN — ENOXAPARIN SODIUM 40 MG: 40 INJECTION SUBCUTANEOUS at 12:43

## 2020-01-29 RX ADMIN — LEVETIRACETAM 1500 MG: 100 INJECTION, SOLUTION INTRAVENOUS at 10:32

## 2020-01-29 NOTE — PROGRESS NOTES
Problem: Mobility Impaired (Adult and Pediatric)  Goal: *Acute Goals and Plan of Care (Insert Text)  Description  FUNCTIONAL STATUS PRIOR TO ADMISSION: Pt non-verbal and unable to provide history. Per chart, pt lives in nursing facility where she ambulated short distances with unsteady gait, used w/c for increased distance    [address]: The patient lived in [address] [place] [address]  Initiated 1/25/2020  1. Patient will move from supine to sit and sit to supine  in bed with moderate assistance  within 7 day(s). 2.  Patient will transfer from bed to chair and chair to bed with mod assistance using the least restrictive device within 7 day(s). 3.  Patient will perform sit to stand with moderate assistance  within 7 day(s). 4.  Patient will ambulate with moderate assistance  for 5 feet with the least restrictive device within 7 day(s). Outcome: Progressing Towards Goal   PHYSICAL THERAPY TREATMENT  Patient: Lenetta Sever (89 y.o. female)  Date: 1/29/2020  Diagnosis: Hip fracture ([facility] [S72.009A]   Hip fracture ([facility]  Procedure(s) (LRB):  RIGHT HIP HEMIARTHROPLASTY (Right) 5 Days Post-Op  Precautions: Fall, WBAT, Seizure(nonverbal)  Chart, physical therapy assessment, plan of care and goals were reviewed. ASSESSMENT  Patient continues with skilled PT services and is progressing towards goals. She was able to tolerate moving to EOB with minimal grimacing and maintained sitting balance with intermittent assist for several minutes. For transfer to the chair, she needed max assist to pivot, but she did bear some weight on her LLE. For transfer. Although she was able to tolerate sitting with her feet on the floor, she is not safe to remain that way, especially knowing that she tends to scoot out of her wheelchair at home (per sister), so her feet were elevated in the recliner and chair alarm under her.   Continue to recommend SNF level rehab when medically ready.    Current Level of Function Impacting Discharge (mobility/balance): max assist for bed mobility and transfers    Other factors to consider for discharge: nonverbal, was able to mobilize with limited family assist prior to admission         PLAN :  Patient continues to benefit from skilled intervention to address the above impairments. Continue treatment per established plan of care. to address goals. Recommendation for discharge: (in order for the patient to meet his/her long term goals)  Therapy up to 5 days/week in SNF setting    This discharge recommendation:  Has been made in collaboration with the attending provider and/or case management    IF patient discharges home will need the following DME: to be determined (TBD)       SUBJECTIVE:   Patient is nonverbal    OBJECTIVE DATA SUMMARY:   Critical Behavior:  Neurologic State: Alert  Orientation Level: Unable to verbalize  Cognition: Decreased command following, Decreased attention/concentration  Safety/Judgement: Decreased awareness of environment  Functional Mobility Training:  Bed Mobility:     Supine to Sit: Maximum assistance  Sit to Supine: (up in chair after)           Transfers:  Sit to Stand: Maximum assistance  Stand to Sit: Moderate assistance  Stand Pivot Transfers: Maximum assistance                          Balance:  Sitting: Impaired; Without support  Sitting - Static: Fair (occasional)(once sitting, needs cues to use hands to support herself)  Sitting - Dynamic: Poor (constant support)  Standing: Impaired;Pull to stand; With support  Standing - Static: Constant support;Poor  Standing - Dynamic : Constant support;Poor  Ambulation/Gait Training:                                                        Stairs:               Therapeutic Exercises:   Sitting balance  Pain Rating:  Some intermittent grimace initially upon sitting and with transfer, but then appeared calm at rest in the chair    Activity Tolerance:   Fair  Please refer to the flowsheet for vital signs taken during this treatment.     After treatment patient left in no apparent distress:   Sitting in chair, Call bell within reach, and Bed / chair alarm activated    COMMUNICATION/COLLABORATION:   The patients plan of care was discussed with: Registered Nurse    Sudha Ray, PT   Time Calculation: 23 mins

## 2020-01-29 NOTE — PROGRESS NOTES
ORTHO PROGRESS NOTE      SUBJECTIVE:  Ashu Chu remains non-conversant. OBJECTIVE:  Patient Vitals for the past 24 hrs:   Temp Pulse BP   01/29/20 1439 98.2 °F (36.8 °C) 84 140/83   01/29/20 0828 97.4 °F (36.3 °C) 78 147/82   01/29/20 0255 98.7 °F (37.1 °C) 92 122/73   01/28/20 2004 99 °F (37.2 °C) 90 114/55       Awake, no distress. Lying in bed. No family present. Respirations unlabored. No obvious pain with gentle PROM. Thigh soft. Dressing has contained bloody staining. Recent Labs     01/28/20  0203   HGB 9.5*   HCT 29.1*      BUN 8   CREA 0.54*   GFRAA >60   GFRNA >60       ASSESSMENT:  Procedure: Procedure(s):  RIGHT HIP HEMIARTHROPLASTY  Post Op day: 5 Days Post-Op    PLAN:  DVT prophylaxis: Lovenox injections 40mg sub q once daily until 21 days from surgery, then take EC aspirin 81 mg twice daily for 2 additional weeks. WBAT  Tylenol  Disp: plan for SNF    Will sign off. Call again if questions.     ANITRA Valdivia  Orthopedic Trauma Service  [facility]

## 2020-01-29 NOTE — PROGRESS NOTES
NUTRITION FOLLOW-UP ASSESSMENT    RECOMMENDATIONS:   1. Continue pureed diet; adjust PRN if SLP evaluates or if pt able to demonstrate she can chew more solid foods  2. Staff/family feed and encourage/assist to take PO fluids throughout day  3. RD change Ensure Compact TID to Boost pudding (chocolate) and Magic Cup (orange)     Interventions/Plan:   Food/Nutrient Delivery:  Modify diet/texture/consistency/nutrients(RD downgrade to pureed) Commercial supplement(Ensure Compact TID) Feeding assistance at [facility] (Rx Oscal D;  Rx Decara PTA)    Nutrition Education:     Coordination of Care:    Nutrition Counseling:        Assessment:   Reason for Assessment:   [x] Provider Consult: Geriatric hip fx    Diet: Puree  Supplements: RD change Ensure Compact TID to Boost pudding chocolate and Magic Cup (orange)  Nutritionally Significant Medications: [x] Reviewed & Includes: Oscal D, Miralax, Pericolace  Meal Intake: 0% refused meals; 15:15 today accepted 100% Boost pudding (chocolate) + 100% Magic Cup (orange)    Subjective:  Pt non-verbal;  Per RN, sister told her she takes meds in applesauce and likes ravioli. Refused to eat or take meds yesterday, and unwilling to eat or drink for SLP bedside eval.  No family at bedside. Lives with sister with [facility]. Per family/OT able to feed self with spoon at baseline, but also fed since eats so slowly. RD spoon-fed her 100% Boost pudding chocolate and 100% Magic Cup orange at 15:15 today. RN then attempted to give meds with same and no success. May have been hungry initially when RD attempted, but ? No longer hungry or willing to eat/drink. .     Objective:  Past Medical History:   Diagnosis Date    Epilepsy ([facility]     Gait instability     Hypertension     Neurological disorder     Seizures (HCC)     UTI (urinary tract infection)     Vitamin D deficiency      Vitamin D deficiency w/Rx Decara PTA; Rx Oscal D now.   Admit R-hip fx, s/p R-hemiarthroplasty (1/24/2020). BMI 24 WNL however, ? UBW or recent wt trends. No family present and no recent prior encounters for trend. Admit wt ~126#, last recorded wt 130# (2/22/2017). Pt has order for \"Ensure\" BID from [facility]. Currently not self-initiating drinking/eating. Fed by staff. Ensure Compact unopened at bedside. RD was able to feed her 100% Boost pudding and 100% Magic Cup and will change ONS orders however, ? If pt will accept PO again. Continues to need sips/bites throughout day to meet needs. Total Feeder with poor appetite/intake; refused meals and meds. IV running at 50 mL/hr for fluids. Edentulous and RD previously downgraded diet texture from dental soft to pureed. ? Diet type PTA, sister told RN the pt ate ravioli. RN was passing meds with ice cream which was accepted well, but sister claims she takes meds at home . Last BM 1/28/2020 with Rx Miralax and Pericolace. Noted Rx Diocto BID PTA via [employer]. Remains at nutrition risk, edentulous, refusing to eat/drink and refuses to be assisted to eat/drink. One limited success to feed pt now. Estimated Nutrition Needs:   Kcals/day: 1200 Kcals/day  Protein: 70 g(1.2 gm/kg)  Fluid: 1200 ml(Minimum 1 mL/kcal)     Based On: Mey Hickey Navneet(MSJ x 1.2)  Weight Used: Actual wt(57 kg)    Pt expected to meet estimated nutrient needs: [x]  No, not without feeding, ONS and appetite improvement. Comparative Standards:  ;  ;      Nutrition Diagnosis:   1. Inadequate oral intake related to mentation, dentition as evidenced by non-verbal; edentulous, poor PO intake, does not self-inititate eating or drinking; IV for fluids; Ensure BID PTA. 2. Increased nutrient needs related to Vitamin D, protein as evidenced by Hx Vitamin D deficiency with Rx Decara PT and increased protein needs for geriatric surgical healing with poor appetite. Goals:     Accept 100% ONS minimum BID and minimum 25% all meals through discharge.       Monitoring & Evaluation:    - Oral fluids amount, Liquid meal replacement, Protein intake, Vitamin intake, IV fluids, Total energy intake   - Weight/weight change, Lean body mass, fat free mass, Vitamin profile, GI   - Acceptance of assist with eating    Previous Nutrition Goals Met:  No; Refuses to eat/drink and not accepting Ensure Compact as previously ordered. Previous Recommendations:      N/A    Education & Discharge Needs:   [x] None Identified: Pt non-verbal; neurological    [x] Participated in care plan, discharge planning, and/or interdisciplinary rounds        Cultural, Presybeterian and ethnic food preferences identified:    N/A    Skin Integrity: [x]Intact R-hip incision   Edema: [x]None   Last BM: 1/28/2020  Food Allergies: [x]NKA    Anthropometrics:    Weight Loss Metrics 1/28/2020 2/22/2017   Today's Wt 131 lb 1.6 oz 130 lb   BMI 25.6 kg/m2 25.39 kg/m2      Last 3 Recorded Weights in this Encounter    01/24/20 1041 01/26/20 0752 01/28/20 0740   Weight: 50.9 kg (112 lb 3.4 oz) 57 kg (125 lb 10.6 oz) 59.5 kg (131 lb 1.6 oz)      Weight Source: Bed  Height: 5' (152.4 cm), Height Source: (ED prior encounter 2/22/2017)  Body mass index is 25.6 kg/m². IBW : 45.4 kg (100 lb),    Usual Body Weight: (Unknown; Last wt 130# (2/22/2017)),      Labs:    Lab Results   Component Value Date/Time    Sodium 138 01/28/2020 02:03 AM    Potassium 3.1 (L) 01/28/2020 02:03 AM    Chloride 107 01/28/2020 02:03 AM    CO2 23 01/28/2020 02:03 AM    Glucose 111 (H) 01/28/2020 02:03 AM    BUN 8 01/28/2020 02:03 AM    Creatinine 0.54 (L) 01/28/2020 02:03 AM    Calcium 8.7 01/28/2020 02:03 AM    Albumin 3.1 (L) 01/24/2020 03:25 AM     No results found for: HBA1C, HGBE8, GSM9ZXVH, PTP4KQRN, PQD3XASA  Lab Results   Component Value Date/Time    Glucose 111 (H) 01/28/2020 02:03 AM      Lab Results   Component Value Date/Time    ALT (SGPT) 22 01/24/2020 03:25 AM    AST (SGOT) 24 01/24/2020 03:25 AM    Alk.  phosphatase 65 01/24/2020 03:25 AM Bilirubin, total 0.8 01/24/2020 03:25 AM        Yonatan Milton, RD

## 2020-01-29 NOTE — PROGRESS NOTES
Bedside and Verbal shift change report given to Bro Santacruz RN (oncoming nurse) by Breanna Basurto RN (offgoing nurse). Report included the following information SBAR, Kardex, OR Summary, Procedure Summary, Intake/Output, MAR, Accordion and Recent Results.

## 2020-01-29 NOTE — PROGRESS NOTES
Paged Dr. Jitendra Packer,  Patient unable to take any PO meds after several attempts. [facility] with Dr. Jitendra Packer. Per MD, okay to request pharmacy to order liquid tylenol.

## 2020-01-29 NOTE — PROGRESS NOTES
OC: Discharge to [address] once Level II screening has been completed/approved. JOE submitted [facility] for processing. This patient is a [facility] patient. [place] will provide SNF auth and will arrange discharge transportation. [phone]. She will be here around 6 PM to complete the Level II screening. JOE notified Phylicia Sanchez RN.     Moni Hanley, BSW/CRM

## 2020-01-29 NOTE — PROGRESS NOTES
Bedside shift change report given to [facility] KELVIN (oncoming nurse) by Ced Mclaughlin (offgoing nurse). Report included the following information SBAR, Kardex, Intake/Output and MAR.

## 2020-01-30 LAB
ANION GAP SERPL CALC-SCNC: 5 MMOL/L (ref 5–15)
BUN SERPL-MCNC: 6 MG/DL (ref 6–20)
BUN/CREAT SERPL: 13 (ref 12–20)
CALCIUM SERPL-MCNC: 8.7 MG/DL (ref 8.5–10.1)
CHLORIDE SERPL-SCNC: 109 MMOL/L (ref 97–108)
CO2 SERPL-SCNC: 25 MMOL/L (ref 21–32)
CREAT SERPL-MCNC: 0.48 MG/DL (ref 0.55–1.02)
ERYTHROCYTE [DISTWIDTH] IN BLOOD BY AUTOMATED COUNT: 12.8 % (ref 11.5–14.5)
GLUCOSE SERPL-MCNC: 97 MG/DL (ref 65–100)
HCT VFR BLD AUTO: 27.2 % (ref 35–47)
HGB BLD-MCNC: 8.6 G/DL (ref 11.5–16)
MAGNESIUM SERPL-MCNC: 1.6 MG/DL (ref 1.6–2.4)
MCH RBC QN AUTO: 27.1 PG (ref 26–34)
MCHC RBC AUTO-ENTMCNC: 31.6 G/DL (ref 30–36.5)
MCV RBC AUTO: 85.8 FL (ref 80–99)
NRBC # BLD: 0 K/UL (ref 0–0.01)
NRBC BLD-RTO: 0 PER 100 WBC
PLATELET # BLD AUTO: 249 K/UL (ref 150–400)
PMV BLD AUTO: 9.1 FL (ref 8.9–12.9)
POTASSIUM SERPL-SCNC: 3 MMOL/L (ref 3.5–5.1)
RBC # BLD AUTO: 3.17 M/UL (ref 3.8–5.2)
SODIUM SERPL-SCNC: 139 MMOL/L (ref 136–145)
WBC # BLD AUTO: 5.7 K/UL (ref 3.6–11)

## 2020-01-30 PROCEDURE — 74011250637 HC RX REV CODE- 250/637: Performed by: PHYSICIAN ASSISTANT

## 2020-01-30 PROCEDURE — 74011250637 HC RX REV CODE- 250/637: Performed by: INTERNAL MEDICINE

## 2020-01-30 PROCEDURE — 74011250636 HC RX REV CODE- 250/636: Performed by: INTERNAL MEDICINE

## 2020-01-30 PROCEDURE — 74011000258 HC RX REV CODE- 258: Performed by: HOSPITALIST

## 2020-01-30 PROCEDURE — 74011250636 HC RX REV CODE- 250/636: Performed by: HOSPITALIST

## 2020-01-30 PROCEDURE — 77030038269 HC DRN EXT URIN PURWCK BARD -A

## 2020-01-30 PROCEDURE — 65270000029 HC RM PRIVATE

## 2020-01-30 PROCEDURE — 80048 BASIC METABOLIC PNL TOTAL CA: CPT

## 2020-01-30 PROCEDURE — 97116 GAIT TRAINING THERAPY: CPT

## 2020-01-30 PROCEDURE — 36415 COLL VENOUS BLD VENIPUNCTURE: CPT

## 2020-01-30 PROCEDURE — 97530 THERAPEUTIC ACTIVITIES: CPT

## 2020-01-30 PROCEDURE — 85027 COMPLETE CBC AUTOMATED: CPT

## 2020-01-30 PROCEDURE — 83735 ASSAY OF MAGNESIUM: CPT

## 2020-01-30 RX ORDER — POTASSIUM CHLORIDE 750 MG/1
40 TABLET, FILM COATED, EXTENDED RELEASE ORAL EVERY 4 HOURS
Status: DISCONTINUED | OUTPATIENT
Start: 2020-01-30 | End: 2020-01-30

## 2020-01-30 RX ORDER — POTASSIUM CHLORIDE 7.45 MG/ML
10 INJECTION INTRAVENOUS
Status: COMPLETED | OUTPATIENT
Start: 2020-01-30 | End: 2020-01-31

## 2020-01-30 RX ORDER — MAGNESIUM SULFATE HEPTAHYDRATE 40 MG/ML
2 INJECTION, SOLUTION INTRAVENOUS ONCE
Status: COMPLETED | OUTPATIENT
Start: 2020-01-30 | End: 2020-01-30

## 2020-01-30 RX ADMIN — POTASSIUM CHLORIDE 10 MEQ: 10 INJECTION, SOLUTION INTRAVENOUS at 20:28

## 2020-01-30 RX ADMIN — ENOXAPARIN SODIUM 40 MG: 40 INJECTION SUBCUTANEOUS at 12:59

## 2020-01-30 RX ADMIN — OYSTER SHELL CALCIUM WITH VITAMIN D 1 TABLET: 500; 200 TABLET, FILM COATED ORAL at 14:02

## 2020-01-30 RX ADMIN — MAGNESIUM SULFATE HEPTAHYDRATE 2 G: 40 INJECTION, SOLUTION INTRAVENOUS at 13:57

## 2020-01-30 RX ADMIN — POTASSIUM CHLORIDE 10 MEQ: 10 INJECTION, SOLUTION INTRAVENOUS at 19:24

## 2020-01-30 RX ADMIN — POTASSIUM CHLORIDE 10 MEQ: 10 INJECTION, SOLUTION INTRAVENOUS at 20:00

## 2020-01-30 RX ADMIN — LEVETIRACETAM 1500 MG: 100 INJECTION, SOLUTION INTRAVENOUS at 15:21

## 2020-01-30 RX ADMIN — POTASSIUM CHLORIDE 10 MEQ: 10 INJECTION, SOLUTION INTRAVENOUS at 22:50

## 2020-01-30 RX ADMIN — POTASSIUM CHLORIDE 10 MEQ: 10 INJECTION, SOLUTION INTRAVENOUS at 17:44

## 2020-01-30 NOTE — PROGRESS NOTES
Problem: Mobility Impaired (Adult and Pediatric)  Goal: *Acute Goals and Plan of Care (Insert Text)  Description  FUNCTIONAL STATUS PRIOR TO ADMISSION: Pt non-verbal and unable to provide history. Per chart, pt lives in nursing facility where she ambulated short distances with unsteady gait, used w/c for increased distance    [address]: The patient lived in [address] [place] [address]  Initiated 1/25/2020  1. Patient will move from supine to sit and sit to supine  in bed with moderate assistance  within 7 day(s). 2.  Patient will transfer from bed to chair and chair to bed with mod assistance using the least restrictive device within 7 day(s). 3.  Patient will perform sit to stand with moderate assistance  within 7 day(s). 4.  Patient will ambulate with moderate assistance  for 5 feet with the least restrictive device within 7 day(s). Outcome: Progressing Towards Goal   PHYSICAL THERAPY TREATMENT  Patient: Amanda Bach (89 y.o. female)  Date: 1/30/2020  Diagnosis: Hip fracture ([facility] [S72.009A]   Hip fracture ([facility]  Procedure(s) (LRB):  RIGHT HIP HEMIARTHROPLASTY (Right) 6 Days Post-Op  Precautions: Fall, WBAT, Seizure(nonverbal)  Chart, physical therapy assessment, plan of care and goals were reviewed. ASSESSMENT  Patient continues with skilled PT services and is progressing towards goals. She was more alert today and was better able to balance sitting EOB. She needed hand over hand assist to grasp the walker, but then was able to take a few steps with the walker. She initiates a step with the RLE, but needs more support to weight shift onto the R to step with her L. After walking a few feet, she was positioned up in the chair, but recliner chair had been removed from her room. She was not safe to remain in the straight backed chair, as she started to shift her hips forward to scoot out of the chair.   Therefore, she was returned to bed and positioned up in chair position in the bed  Expect she will continue to improve and progress her activity and recommend SNF level rehab to enable a safe transition home with family. Current Level of Function Impacting Discharge (mobility/balance): max assist for a few feet of walking    Other factors to consider for discharge: largely nonverbal         PLAN :  Patient continues to benefit from skilled intervention to address the above impairments. Continue treatment per established plan of care. to address goals. Recommendation for discharge: (in order for the patient to meet his/her long term goals)  Therapy up to 5 days/week in SNF setting    This discharge recommendation:  Has been made in collaboration with the attending provider and/or case management    IF patient discharges home will need the following DME: to be determined (TBD)       SUBJECTIVE:   Patient smiled on arrival and after she was repositioned in bed. Some grimace noted with weight on the RLE    OBJECTIVE DATA SUMMARY:   Critical Behavior:  Neurologic State: Alert, Appropriate for age  Orientation Level: Unable to verbalize  Cognition: Decreased attention/concentration  Safety/Judgement: Decreased awareness of environment  Functional Mobility Training:  Bed Mobility:     Supine to Sit: Moderate assistance; Additional time  Sit to Supine: Maximum assistance           Transfers:  Sit to Stand: Moderate assistance; Adaptive equipment; Additional time  Stand to Sit: Moderate assistance; Adaptive equipment; Additional time        Bed to Chair: Maximum assistance; Adaptive equipment; Additional time                    Balance:  Sitting: Impaired; Without support  Sitting - Static: Occassional  Sitting - Dynamic: Occassional  Standing: Impaired; With support  Standing - Static: Constant support;Poor  Standing - Dynamic : Constant support;Poor  Ambulation/Gait Training:  Distance (ft): 4 Feet (ft)  Assistive Device: Gait belt;Walker, rolling  Ambulation - Level of Assistance: Maximum assistance; Adaptive equipment; Additional time; Moderate assistance(first few steps mod assist, but then max )        Gait Abnormalities: Antalgic;Decreased step clearance; Step to gait;Trunk sway increased        Base of Support: Shift to left;Narrowed; Center of gravity altered  Stance: Right decreased  Speed/Diana: Pace decreased (<100 feet/min); Shuffled  Step Length: Right shortened;Left shortened  Swing Pattern: Right asymmetrical     Interventions: Tactile cues;Manual cues; Verbal cues           Stairs: Therapeutic Exercises:     Pain Rating:  Appeared to have some pain with weight bearing on the RLE and with stand to sit    Activity Tolerance:   Good  Please refer to the flowsheet for vital signs taken during this treatment.     After treatment patient left in no apparent distress:   Call bell within reach, Bed / chair alarm activated, Caregiver / family present, Side rails x 3, and bed in chair position     COMMUNICATION/COLLABORATION:   The patients plan of care was discussed with: Registered Nurse and     Daisy Lemon, PT   Time Calculation: 27 mins

## 2020-01-30 NOTE — PROGRESS NOTES
Spiritual Care Assessment/Progress Note  [facility]      NAME: Kim Woodward      MRN: [id number]  AGE: 69 y.o. SEX: female  Catholic Affiliation: Unknown   Language: English     1/30/2020     Total Time (in minutes): 5     Spiritual Assessment begun in [facility] through conversation with:         []Patient        [] Family    [] Friend(s)        Reason for Consult: Initial/Spiritual assessment, patient floor     Spiritual beliefs: (Please include comment if needed)     [] Identifies with a wesley tradition:         [] Supported by a wesley community:            [] Claims no spiritual orientation:           [] Seeking spiritual identity:                [] Adheres to an individual form of spirituality:           [x] Not able to assess:                           Identified resources for coping:      [] Prayer                               [] Music                  [] Guided Imagery     [] Family/friends                 [] Pet visits     [] Devotional reading                         [x] Unknown     [] Other:                                               Interventions offered during this visit: (See comments for more details)                Plan of Care:     [] Support spiritual and/or cultural needs    [] Support AMD and/or advance care planning process      [] Support grieving process   [] Coordinate Rites and/or Rituals    [] Coordination with community clergy   [] No spiritual needs identified at this time   [] Detailed Plan of Care below (See Comments)  [] Make referral to Music Therapy  [] Make referral to Pet Therapy     [] Make referral to Addiction services  [] Make referral to [facility]  [] Make referral to Spiritual Care Partner  [] No future visits requested        [x] Follow up visits as needed     Comments:  visit for initial spiritual assessment. Staff with patient providing care. Will continue to follow up as needed and upon request as able. Visited by Rev. Martha Meadows Dejon Melton [facility], [facility]   paging service: [phone])

## 2020-01-30 NOTE — PROGRESS NOTES
Bedside and Verbal shift change report given to Pipo hector RN (oncoming nurse) by Jarrod Rodriguez RN (offgoing nurse). Report included the following information SBAR, Kardex, Procedure Summary, Intake/Output, MAR and Recent Results.

## 2020-01-30 NOTE — PROGRESS NOTES
Bedside and Verbal shift change report given to Deepali Campbell RN (oncoming nurse) by Tabatha Delatorre RN (offgoing nurse). Report included the following information SBAR, Kardex, Procedure Summary, Intake/Output, MAR and Recent Results.

## 2020-01-30 NOTE — PROGRESS NOTES
Bedside and Verbal shift change report given to Viktoriya Ayala RN (oncoming nurse) by Juan Francisco Osorio RN (offgoing nurse). Report included the following information SBAR, Kardex, OR Summary, Procedure Summary, Intake/Output, MAR and Recent Results.

## 2020-01-30 NOTE — PROGRESS NOTES
[facility] [facility] Adult  Hospitalist Group                                                                                          Hospitalist Progress Note  Shanell Burton MD  Answering service: [phone] OR 36 from in house phone        Date of Service:  2020  NAME:  Emily Pineda  :  1945  MRN:  [id number]      Admission Summary:   Emily Pineda is a 69 y.o. female who has a history of epilepsy and cerebral palsy and is presenting from a facility [facility] with hip pain. Patient is not able to give me history as she is nonverbal.  It was patient sister who told the EMS that the patient is complaining of pain and difficulty walking because of her the right hip nobody knows that if she had fallen. She was sent here by the nursing home because they did a right hip fracture after the complaint but the sister ended noticed that there was hip fracture on the right side. She was sent to [address] [place] [id number] for the same    Interval history / Subjective:    F/u right proximal femur fracture    Patient has cerebral palsy, per chart review -she is non verbal at baseline. Per RN, intermittent trouble with medications as patient continues to refuse. Level 2 process awaiting for SNF placement. No overnight events. No other concerns. Assessment & Plan:     Subcapital right proximal femur fracture. Status post right hip hemiarthroplasty by Ortho on 2020.  -management per Ortho, appreciate discussion with Ortho team  -PT/OT  -lovenox for dvt prophylaxis    epilepsy  Continue Keppra-change to IV as patient refusing meds this morning     Hypertension:  Hold amlodipine,BP wnl     cerebral palsy  -supportive care    PT/OT SNF    Code status: full   DVT prophylaxis: Lovenox    Plan: SNF    Care Plan discussed with: nurse  Disposition: [place] and rehab, under PACE program, level II awaiting. accepted at [facility].      Hospital Problems  Date Reviewed: 2020          Codes Class Noted POA    * (Principal) Hip fracture [facility]) ICD-10-CM: S72.009A  ICD-9-CM: 820.8  1/23/2020 Yes                Review of Systems:   A comprehensive review of systems was negative except for that written in the HPI. Vital Signs:    Last 24hrs VS reviewed since prior progress note. Most recent are:  Visit Vitals  /83 (BP 1 Location: Left arm)   Pulse 84   Temp 98.2 °F (36.8 °C)   Resp 18   Ht 5' (1.524 m)   Wt 59.5 kg (131 lb 1.6 oz)   SpO2 98%   BMI 25.60 kg/m²         Intake/Output Summary (Last 24 hours) at 1/29/2020 2139  Last data filed at 1/29/2020 1800  Gross per 24 hour   Intake    Output 650 ml   Net -650 ml        Physical Examination:             Constitutional:  non verbal,elderly patient    ENT:  Oral mucous moist, oropharynx benign. EOMI,normal conjunctva   Resp:  CTA bilaterally. No wheezing/rhonchi/rales. No accessory muscle use   CV:  Regular rhythm, normal rate    GI:  Soft, non distended, non tender. normoactive bowel sounds, no hepatosplenomegaly     Musculoskeletal:  contractures extremities    Neurologic: Awake,moves extremities spontaneously,does not follow commands           Data Review:    Review and/or order of clinical lab test      Labs:     Recent Labs     01/28/20  0203 01/27/20  0221   WBC 7.6 9.0   HGB 9.5* 9.6*   HCT 29.1* 30.1*    157     Recent Labs     01/28/20  0203      K 3.1*      CO2 23   BUN 8   CREA 0.54*   *   CA 8.7     No results for input(s): SGOT, GPT, ALT, AP, TBIL, TBILI, TP, ALB, GLOB, GGT, AML, LPSE in the last 72 hours. No lab exists for component: AMYP, HLPSE  No results for input(s): INR, PTP, APTT, INREXT, INREXT in the last 72 hours. No results for input(s): FE, TIBC, PSAT, FERR in the last 72 hours. No results found for: FOL, RBCF   No results for input(s): PH, PCO2, PO2 in the last 72 hours. No results for input(s): CPK, CKNDX, TROIQ in the last 72 hours.     No lab exists for component: CPKMB  No results found for: CHOL, CHOLX, CHLST, CHOLV, HDL, HDLP, LDL, LDLC, DLDLP, TGLX, TRIGL, TRIGP, CHHD, CHHDX  No results found for: GLUCPOC  Lab Results   Component Value Date/Time    Color DARK YELLOW 01/23/2020 02:30 PM    Appearance CLEAR 01/23/2020 02:30 PM    Specific gravity 1.027 01/23/2020 02:30 PM    Specific gravity 1.024 02/22/2017 09:20 AM    pH (UA) 5.5 01/23/2020 02:30 PM    Protein TRACE (A) 01/23/2020 02:30 PM    Glucose NEGATIVE  01/23/2020 02:30 PM    Ketone NEGATIVE  01/23/2020 02:30 PM    Bilirubin NEGATIVE  01/23/2020 02:30 PM    Urobilinogen 2.0 (H) 01/23/2020 02:30 PM    Nitrites NEGATIVE  01/23/2020 02:30 PM    Leukocyte Esterase TRACE (A) 01/23/2020 02:30 PM    Epithelial cells FEW 01/23/2020 02:30 PM    Bacteria 1+ (A) 01/23/2020 02:30 PM    WBC 0-4 01/23/2020 02:30 PM    RBC 0-5 01/23/2020 02:30 PM         Medications Reviewed:     Current Facility-Administered Medications   Medication Dose Route Frequency    acetaminophen (TYLENOL) solution 650 mg  650 mg Oral Q6H    levETIRAcetam (KEPPRA) 1,500 mg in 0.9% sodium chloride 100 mL IVPB  1,500 mg IntraVENous DAILY    0.9% sodium chloride infusion  50 mL/hr IntraVENous CONTINUOUS    loratadine (CLARITIN) tablet 10 mg  10 mg Oral DAILY PRN    sodium chloride (NS) flush 5-40 mL  5-40 mL IntraVENous Q8H    sodium chloride (NS) flush 5-40 mL  5-40 mL IntraVENous PRN    naloxone (NARCAN) injection 0.4 mg  0.4 mg IntraVENous PRN    calcium-vitamin D (OS-BROOKLYN) 500 mg-200 unit tablet  1 Tab Oral TID WITH MEALS    senna-docusate (PERICOLACE) 8.6-50 mg per tablet 1 Tab  1 Tab Oral BID    polyethylene glycol (MIRALAX) packet 17 g  17 g Oral DAILY    bisacodyL (DULCOLAX) suppository 10 mg  10 mg Rectal DAILY PRN    traMADol (ULTRAM) tablet 50 mg  50 mg Oral Q6H PRN    hydroxyzine HCL (ATARAX) tablet 10 mg  10 mg Oral Q8H PRN    enoxaparin (LOVENOX) injection 40 mg  40 mg SubCUTAneous Q24H ______________________________________________________________________  EXPECTED LENGTH OF STAY: 2d 4h  ACTUAL LENGTH OF STAY:          Wendy Mejia MD

## 2020-01-31 LAB
ANION GAP SERPL CALC-SCNC: 8 MMOL/L (ref 5–15)
BUN SERPL-MCNC: 4 MG/DL (ref 6–20)
BUN/CREAT SERPL: 6 (ref 12–20)
CALCIUM SERPL-MCNC: 10.2 MG/DL (ref 8.5–10.1)
CHLORIDE SERPL-SCNC: 107 MMOL/L (ref 97–108)
CO2 SERPL-SCNC: 20 MMOL/L (ref 21–32)
CREAT SERPL-MCNC: 0.63 MG/DL (ref 0.55–1.02)
ERYTHROCYTE [DISTWIDTH] IN BLOOD BY AUTOMATED COUNT: 13.5 % (ref 11.5–14.5)
GLUCOSE SERPL-MCNC: 91 MG/DL (ref 65–100)
HCT VFR BLD AUTO: 41.2 % (ref 35–47)
HGB BLD-MCNC: 12.5 G/DL (ref 11.5–16)
MAGNESIUM SERPL-MCNC: 2 MG/DL (ref 1.6–2.4)
MCH RBC QN AUTO: 27.1 PG (ref 26–34)
MCHC RBC AUTO-ENTMCNC: 30.3 G/DL (ref 30–36.5)
MCV RBC AUTO: 89.2 FL (ref 80–99)
NRBC # BLD: 0 K/UL (ref 0–0.01)
NRBC BLD-RTO: 0 PER 100 WBC
PLATELET # BLD AUTO: 320 K/UL (ref 150–400)
PMV BLD AUTO: 8.8 FL (ref 8.9–12.9)
POTASSIUM SERPL-SCNC: 4.6 MMOL/L (ref 3.5–5.1)
RBC # BLD AUTO: 4.62 M/UL (ref 3.8–5.2)
SODIUM SERPL-SCNC: 135 MMOL/L (ref 136–145)
WBC # BLD AUTO: 6.8 K/UL (ref 3.6–11)

## 2020-01-31 PROCEDURE — 74011000258 HC RX REV CODE- 258: Performed by: HOSPITALIST

## 2020-01-31 PROCEDURE — 77030038269 HC DRN EXT URIN PURWCK BARD -A

## 2020-01-31 PROCEDURE — 74011250636 HC RX REV CODE- 250/636: Performed by: INTERNAL MEDICINE

## 2020-01-31 PROCEDURE — 36415 COLL VENOUS BLD VENIPUNCTURE: CPT

## 2020-01-31 PROCEDURE — 97116 GAIT TRAINING THERAPY: CPT

## 2020-01-31 PROCEDURE — 85027 COMPLETE CBC AUTOMATED: CPT

## 2020-01-31 PROCEDURE — 74011250637 HC RX REV CODE- 250/637: Performed by: PHYSICIAN ASSISTANT

## 2020-01-31 PROCEDURE — 97535 SELF CARE MNGMENT TRAINING: CPT

## 2020-01-31 PROCEDURE — 65270000029 HC RM PRIVATE

## 2020-01-31 PROCEDURE — 74011250637 HC RX REV CODE- 250/637: Performed by: INTERNAL MEDICINE

## 2020-01-31 PROCEDURE — 80048 BASIC METABOLIC PNL TOTAL CA: CPT

## 2020-01-31 PROCEDURE — 74011250636 HC RX REV CODE- 250/636: Performed by: HOSPITALIST

## 2020-01-31 PROCEDURE — 83735 ASSAY OF MAGNESIUM: CPT

## 2020-01-31 RX ADMIN — ACETAMINOPHEN 650 MG: 650 SOLUTION ORAL at 20:33

## 2020-01-31 RX ADMIN — ENOXAPARIN SODIUM 40 MG: 40 INJECTION SUBCUTANEOUS at 12:22

## 2020-01-31 RX ADMIN — Medication 10 ML: at 20:32

## 2020-01-31 RX ADMIN — OYSTER SHELL CALCIUM WITH VITAMIN D 1 TABLET: 500; 200 TABLET, FILM COATED ORAL at 09:30

## 2020-01-31 RX ADMIN — POTASSIUM CHLORIDE 10 MEQ: 10 INJECTION, SOLUTION INTRAVENOUS at 04:59

## 2020-01-31 RX ADMIN — POTASSIUM CHLORIDE 10 MEQ: 10 INJECTION, SOLUTION INTRAVENOUS at 06:32

## 2020-01-31 RX ADMIN — LEVETIRACETAM 1500 MG: 100 INJECTION, SOLUTION INTRAVENOUS at 12:22

## 2020-01-31 RX ADMIN — POTASSIUM CHLORIDE 10 MEQ: 10 INJECTION, SOLUTION INTRAVENOUS at 00:30

## 2020-01-31 RX ADMIN — SENNOSIDES AND DOCUSATE SODIUM 1 TABLET: 8.6; 5 TABLET ORAL at 09:30

## 2020-01-31 NOTE — PROGRESS NOTES
Problem: Self Care Deficits Care Plan (Adult)  Goal: *Acute Goals and Plan of Care (Insert Text)  Description  Occupational Therapy Goals  Initiated: 1/26/2020   1. Patient will perform grooming with min A sitting in chair/chair position in bed within 7 day(s). 2.  Patient will perform upper body dressing with min A within 7 days. 3.  Patient will perform self feeding for 25% of her meals within 7 day(s). 4.  Patient will perform toilet transfers with mod A within 7 day(s). 5.  Patient will perform all aspects of toileting with mod A within 7 day(s). FUNCTIONAL STATUS PRIOR TO ADMISSION: Pt lives with her sister Cate Rebolledo ([phone]). Contacted sister this morning to provided PLOF as chart was confusing. Patient lives with her sister and PACE support at home in the morning time [facility] Pt is minimally ambulatory but does proper her w/c independently using her feet. She does ambulate in the home short distances. She is able to assist with upper body dressing but is total for LB ADL. She is total A for bathing activities. She does transfer to and from commode with supervision to min A. She goes to day care 5 days per week. She is nonverbal at baseline. She has a ramp on her home and does not require to climb stairs. All meals are provided by family and facility. She eats only using a spoon. At times, she is assisted with feeding due to time constraints for  in the mornings but she is able to feed herself. She only has a w/c at home and no other functioning equipment.      HOME SUPPORT: see above     Outcome: Progressing Towards Goal   OCCUPATIONAL THERAPY TREATMENT  Patient: Roselia Irwin (89 y.o. female)  Date: 1/31/2020  Diagnosis: Hip fracture ([facility] [S72.009A]   Hip fracture ([facility]  Procedure(s) (LRB):  RIGHT HIP HEMIARTHROPLASTY (Right) 7 Days Post-Op  Precautions: Fall, WBAT, Seizure(nonverbal)  Chart, occupational therapy assessment, plan of care, and goals were reviewed. ASSESSMENT  Patient continues with skilled OT services and is progressing towards goals. Demonstrated increase functuional mobility to prepare for toileting tasks. Amb with mod-max A with second person assist for RW mgmt. While in chair offered juice which she turned her head. Washed face with total A and oral care with toothette with mod A she initiated placing in mouth then needing [place] to complete task. Current Level of Function Impacting Discharge (ADLs): oral care mod A, self care transfer max A    Other factors to consider for discharge: Anticipate slow progression secondary to baseline cognitive impairments and being in new environment. Recommend dc to SNF to maximize indep with ADL tasks and decrease caregiver burden. PLAN :  Patient continues to benefit from skilled intervention to address the above impairments. Continue treatment per established plan of care. to address goals. Recommend with staff: in chair for meals with max A for transfer    Recommend next OT session: BSC transfer training    Recommendation for discharge: (in order for the patient to meet his/her long term goals)  Therapy up to 5 days/week in SNF setting    This discharge recommendation:  Has been made in collaboration with the attending provider and/or case management    IF patient discharges home will need the following DME: TBD with progression       SUBJECTIVE:   Patient stated .    OBJECTIVE DATA SUMMARY:   Cognitive/Behavioral Status:  Neurologic State: Alert; Other (Comment)(nonverbal)  Orientation Level: Unable to verbalize  Cognition: Decreased attention/concentration; Impaired decision making             Functional Mobility and Transfers for ADLs:  Bed Mobility:  Supine to Sit: Additional time;Maximum assistance  Sit to Supine: (up in recliner chair after)    Transfers:  Sit to Stand: Maximum assistance; Adaptive equipment; Additional time     Bed to Chair: Maximum assistance; Additional time; Adaptive equipment    Balance:  Sitting: Impaired; Without support  Sitting - Static: Fair (occasional)  Sitting - Dynamic: Poor (constant support)  Standing: Impaired; With support  Standing - Static: Constant support;Fair(needs more assist as she fatigues)  Standing - Dynamic : Constant support;Poor(tends to lean posteriorly)    ADL Intervention:     Patient needing hand over hand and tactile cues to initiate bed mobility, sit > stand and ambulation at RW level. Constant RW mgmt to ambulate. Grooming  Washing Face: Total assistance (dependent)  Brushing Teeth: Moderate assistance(with toothette- patient initiated, need [place] to complete)      Pain:  No s/s of pain with activity    Activity Tolerance:   Fair  Please refer to the flowsheet for vital signs taken during this treatment.     After treatment patient left in no apparent distress:   Sitting in chair, Call bell within reach, and nursing student present, chair alarm activated     COMMUNICATION/COLLABORATION:   The patients plan of care was discussed with: Physical Therapist and Registered Nurse    Jarrod Jones OT  Time Calculation: 19 mins

## 2020-01-31 NOTE — PROGRESS NOTES
Bedside and Verbal shift change report given to Paula Barry RN (oncoming nurse) by Dale Kauffman RN(offgoing nurse). Report included the following information SBAR, Kardex and MAR.

## 2020-01-31 NOTE — PROGRESS NOTES
Problem: Mobility Impaired (Adult and Pediatric)  Goal: *Acute Goals and Plan of Care (Insert Text)  Description  FUNCTIONAL STATUS PRIOR TO ADMISSION: Pt non-verbal and unable to provide history. Per chart, pt lives in nursing facility where she ambulated short distances with unsteady gait, used w/c for increased distance    [address]: The patient lived in [address] [place] [address] completed 1/31/2020, goals updated:  1. Patient will move supine to sit and sit to supine in bed with minimal assistance within 7 days. 2. Patient will transfer sit to stand and stand to sit with least restrictive assistive device with minimal assistance within 7 days,  3. Patient will transfer bed to chair and chair to bed with moderate assistance within 7 days. 4. Patient will ambulate with minimal assistance for 50 feet with the least restrictive assistive device within 7 days. Initiated 1/25/2020  1. Patient will move from supine to sit and sit to supine  in bed with moderate assistance  within 7 day(s). 2.  Patient will transfer from bed to chair and chair to bed with mod assistance using the least restrictive device within 7 day(s). 3.  Patient will perform sit to stand with moderate assistance  within 7 day(s). 4.  Patient will ambulate with moderate assistance  for 5 feet with the least restrictive device within 7 day(s). Outcome: Progressing Towards Goal   PHYSICAL THERAPY TREATMENT: WEEKLY REASSESSMENT  Patient: Yunior Giles (36 y.o. female)  Date: 1/31/2020  Primary Diagnosis: Hip fracture ([facility] [S72.009A]  Procedure(s) (LRB):  RIGHT HIP HEMIARTHROPLASTY (Right) 7 Days Post-Op   Precautions:   Fall, WBAT, Seizure(nonverbal)      ASSESSMENT  Patient continues with skilled PT services and is progressing towards goals. She was able to ambulate quite a bit farther today and appeared to have considerably less pain in the R hip with activity today.   She needs assist to guide the walker and does not initiate mobility without hands on assist. Highly recommend she be OOB in the chair for all meals and up in the watson in the chair if possible. She is used a very social days at [facility] and is definitely more alert when surrounded by people and activity. We will continue to progress her gait as she is able to tolerate and continued to recommend SNF once screening is processed and approved. Patient's progression toward goals since last assessment: much more mobile and alert    Current Level of Function Impacting Discharge (mobility/balance): mod to max assist, but does bear weight on her LEs and does assist with transfers      Other factors to consider for discharge: lives with sister and was ambulatory prior to admission         PLAN :  Goals have been updated based on progression since last assessment. Patient continues to benefit from skilled intervention to address the above impairments. Recommendations and Planned Interventions: bed mobility training, transfer training, gait training, therapeutic exercises, neuromuscular re-education, patient and family training/education, and therapeutic activities      Frequency/Duration: Patient will be followed by physical therapy:  daily to address goals. Recommendation for discharge: (in order for the patient to meet his/her long term goals)  Therapy up to 5 days/week in SNF setting    This discharge recommendation:  Has been made in collaboration with the attending provider and/or case management    IF patient discharges home will need the following DME: to be determined (TBD)         SUBJECTIVE:   Patient largely nonverbal    OBJECTIVE DATA SUMMARY:   HISTORY:    Past Medical History:   Diagnosis Date    Epilepsy ([facility]     Gait instability     Hypertension     Neurological disorder     Seizures ([facility]     UTI (urinary tract infection)     Vitamin D deficiency    History reviewed. No pertinent surgical history.     Home Situation  Home Environment: Private residence  [address] Name: (Ramp at home)  Wheelchair Ramp: Yes  One/Two Story Residence: One story  Living Alone: No  Support Systems: Family member(s)  Patient Expects to be Discharged to[de-identified] Rehabilitation facility  Current DME Used/Available at Home: Wheelchair  Tub or Shower Type: Tub/Shower combination    EXAMINATION/PRESENTATION/DECISION MAKING:   Critical Behavior:  Neurologic State: Alert, Other (Comment)(nonverbal)  Orientation Level: Unable to verbalize  Cognition: Decreased attention/concentration, Impaired decision making  Safety/Judgement: Decreased awareness of environment  Hearing: Auditory  Auditory Impairment: None  Skin:  dressing intact, moderate drainage but unchanged from previous, RN is aware  Edema: minimal R thigh  Range Of Motion:  AROM: Generally decreased, functional(decr hip ext bilat, full knee ext, bilat UE contractures)                       Strength:    Strength: (LEs 3/5, able to bear weight, UEs 3/5 grossly)                    Tone & Sensation:   Tone: Normal                              Coordination:  Coordination: (needs assist with feeding)  Vision:      Functional Mobility:  Bed Mobility:     Supine to Sit: Additional time;Maximum assistance  Sit to Supine: (up in recliner chair after)     Transfers:  Sit to Stand: Maximum assistance; Adaptive equipment; Additional time  Stand to Sit: Moderate assistance; Adaptive equipment; Additional time        Bed to Chair: Maximum assistance; Additional time; Adaptive equipment              Balance:   Sitting: Impaired; Without support  Sitting - Static: Fair (occasional)  Sitting - Dynamic: Poor (constant support)  Standing: Impaired; With support  Standing - Static: Constant support;Fair(needs more assist as she fatigues)  Standing - Dynamic : Constant support;Poor(tends to lean posteriorly)  Ambulation/Gait Training:  Distance (ft): 15 Feet (ft)(with several standing pauses)  Assistive Device: Gait belt;Walker, rolling  Ambulation - Level of Assistance: Adaptive equipment; Additional time;Maximum assistance(to guide walker, facilitate weight shift, support posteriorl)        Gait Abnormalities: Antalgic;Decreased step clearance; Step to gait;Trunk sway increased  Right Side Weight Bearing: As tolerated  Left Side Weight Bearing: Full  Base of Support: Shift to left;Narrowed; Center of gravity altered  Stance: Right decreased  Speed/Diana: Pace decreased (<100 feet/min)  Step Length: Left shortened;Right shortened  Swing Pattern: Right asymmetrical     Interventions: Tactile cues;Manual cues; Verbal cues            Stairs: Therapeutic Exercises:       Functional Measure:        Pain Rating:  Less grimacing and giving way of the RLE with walking today compared to yesterday. She was able to walk until told to sit, rather than starting to sit on her own    Activity Tolerance:   Fair and requires rest breaks  Please refer to the flowsheet for vital signs taken during this treatment. After treatment patient left in no apparent distress:   Sitting in chair, Bed / chair alarm activated, and with OT     COMMUNICATION/EDUCATION:   The patients plan of care was discussed with: Occupational Therapist, Registered Nurse, and . Patient is unable to participate in goal setting and plan of care.     Thank you for this referral.  Thang Miller, PT   Time Calculation: 18 mins

## 2020-01-31 NOTE — PROGRESS NOTES
[address] [facility] Adult  Hospitalist Group                                                                                          Hospitalist Progress Note  Justin Gutierrez MD  Answering service: [phone] OR [phone] from in house phone        Date of Service:  2020  NAME:  Hussain John  :  1945  MRN:  [id number]      Admission Summary:   Hussain John is a 69 y.o. female who has a history of epilepsy and cerebral palsy and is presenting from a facility [facility] with hip pain. Patient is not able to give me history as she is nonverbal.  It was patient sister who told the EMS that the patient is complaining of pain and difficulty walking because of her the right hip nobody knows that if she had fallen. She was sent here by the nursing home because they did a right hip fracture after the complaint but the sister ended noticed that there was hip fracture on the right side. She was sent to [facility] for the same    Interval history / Subjective:    F/u right proximal femur fracture    Patient has cerebral palsy, per chart review -she is non verbal at baseline. Clinical condition unchanged. No complaints. Patient awake but limited responsiveness. Denied any pain, trouble breathing. No overnight events. Level 2 process/insurance approval awaiting for SNF placement. No other concerns. Assessment & Plan:     Subcapital right proximal femur fracture. Status post right hip hemiarthroplasty by Ortho on 2020.  -management per Ortho, appreciate discussion with Ortho team  -PT/OT - WBAT  -lovenox for dvt prophylaxis for 21 days from surgery then ASA 81mg BID x 2 weeks after that  -Tylenol prn.  DC tramadol given hx of epilepsy - risk for lowering seizure threshold    epilepsy  -Continue Keppra-change to IV as patient refusing meds this morning     Hypertension:  -Hold amlodipine,BP wnl     cerebral palsy  -supportive care    PT/OT SNF    Code status: full   DVT prophylaxis: Lovenox    Plan: SNF    Care Plan discussed with: nurse  Disposition: [facility] health and rehab, under [employer], level II assessment completed, insurance approval awaited. accepted at [facility]. Hospital Problems  Date Reviewed: 1/25/2020          Codes Class Noted POA    * (Principal) Hip fracture [facility]) ICD-10-CM: M52.452D  ICD-9-CM: 820.8  1/23/2020 Yes                Review of Systems:   A comprehensive review of systems was negative except for that written in the HPI. Vital Signs:    Last 24hrs VS reviewed since prior progress note. Most recent are:  Visit Vitals  /80 (BP 1 Location: Right arm, BP Patient Position: At rest)   Pulse 72   Temp 98.1 °F (36.7 °C)   Resp 16   Ht 5' (1.524 m)   Wt 61.2 kg (134 lb 14.4 oz)   SpO2 100%   BMI 26.35 kg/m²         Intake/Output Summary (Last 24 hours) at 1/31/2020 1732  Last data filed at 1/31/2020 0753  Gross per 24 hour   Intake 0 ml   Output 1100 ml   Net -1100 ml        Physical Examination:             Constitutional:  non verbal,elderly patient    ENT:  Oral mucous moist, oropharynx benign. EOMI,normal conjunctva   Resp:  CTA bilaterally. No wheezing/rhonchi/rales. No accessory muscle use   CV:  Regular rhythm, normal rate    GI:  Soft, non distended, non tender. normoactive bowel sounds, no hepatosplenomegaly     Musculoskeletal:  contractures extremities    Neurologic: Awake,moves extremities spontaneously,does not follow commands           Data Review:    Review and/or order of clinical lab test      Labs:     Recent Labs     01/31/20  1518 01/30/20  0628   WBC 6.8 5.7   HGB 12.5 8.6*   HCT 41.2 27.2*    249     Recent Labs     01/31/20  1518 01/30/20  0628   * 139   K 4.6 3.0*    109*   CO2 20* 25   BUN 4* 6   CREA 0.63 0.48*   GLU 91 97   CA 10.2* 8.7   MG 2.0 1.6     No results for input(s): SGOT, GPT, ALT, AP, TBIL, TBILI, TP, ALB, GLOB, GGT, AML, LPSE in the last 72 hours.     No lab exists for component: AMYP, HLPSE  No results for input(s): INR, PTP, APTT, INREXT, INREXT in the last 72 hours. No results for input(s): FE, TIBC, PSAT, FERR in the last 72 hours. No results found for: FOL, RBCF   No results for input(s): PH, PCO2, PO2 in the last 72 hours. No results for input(s): CPK, CKNDX, TROIQ in the last 72 hours.     No lab exists for component: CPKMB  No results found for: CHOL, CHOLX, CHLST, CHOLV, HDL, HDLP, LDL, LDLC, DLDLP, TGLX, TRIGL, TRIGP, CHHD, CHHDX  No results found for: GLUCPOC  Lab Results   Component Value Date/Time    Color DARK YELLOW 01/23/2020 02:30 PM    Appearance CLEAR 01/23/2020 02:30 PM    Specific gravity 1.027 01/23/2020 02:30 PM    Specific gravity 1.024 02/22/2017 09:20 AM    pH (UA) 5.5 01/23/2020 02:30 PM    Protein TRACE (A) 01/23/2020 02:30 PM    Glucose NEGATIVE  01/23/2020 02:30 PM    Ketone NEGATIVE  01/23/2020 02:30 PM    Bilirubin NEGATIVE  01/23/2020 02:30 PM    Urobilinogen 2.0 (H) 01/23/2020 02:30 PM    Nitrites NEGATIVE  01/23/2020 02:30 PM    Leukocyte Esterase TRACE (A) 01/23/2020 02:30 PM    Epithelial cells FEW 01/23/2020 02:30 PM    Bacteria 1+ (A) 01/23/2020 02:30 PM    WBC 0-4 01/23/2020 02:30 PM    RBC 0-5 01/23/2020 02:30 PM         Medications Reviewed:     Current Facility-Administered Medications   Medication Dose Route Frequency    acetaminophen (TYLENOL) solution 650 mg  650 mg Oral Q6H    levETIRAcetam (KEPPRA) 1,500 mg in 0.9% sodium chloride 100 mL IVPB  1,500 mg IntraVENous DAILY    loratadine (CLARITIN) tablet 10 mg  10 mg Oral DAILY PRN    sodium chloride (NS) flush 5-40 mL  5-40 mL IntraVENous Q8H    sodium chloride (NS) flush 5-40 mL  5-40 mL IntraVENous PRN    naloxone (NARCAN) injection 0.4 mg  0.4 mg IntraVENous PRN    calcium-vitamin D (OS-BROOKLYN) 500 mg-200 unit tablet  1 Tab Oral TID WITH MEALS    senna-docusate (PERICOLACE) 8.6-50 mg per tablet 1 Tab  1 Tab Oral BID    polyethylene glycol (MIRALAX) packet 17 g  17 g Oral DAILY    bisacodyL (DULCOLAX) suppository 10 mg  10 mg Rectal DAILY PRN    hydroxyzine HCL (ATARAX) tablet 10 mg  10 mg Oral Q8H PRN    enoxaparin (LOVENOX) injection 40 mg  40 mg SubCUTAneous Q24H     ______________________________________________________________________  EXPECTED LENGTH OF STAY: 2d 4h  ACTUAL LENGTH OF STAY:          8                 Keara Maher MD

## 2020-01-31 NOTE — PROGRESS NOTES
Bedside and Verbal shift change report given to [address] [place] [address] [address] (oncoming nurse) by Paul Oconnor RN (offgoing nurse). Report included the following information SBAR, Kardex and MAR.

## 2020-01-31 NOTE — PROGRESS NOTES
[address] [facility] Adult  Hospitalist Group                                                                                          Hospitalist Progress Note  Harrold Buerger, MD  Answering service: [phone] from in house phone        Date of Service:  2020  NAME:  Zeenat Anderson  :  1945  MRN:  [id number]      Admission Summary:   Zeenat Anderson is a 69 y.o. female who has a history of epilepsy and cerebral palsy and is presenting from a facility [facility] with hip pain. Patient is not able to give me history as she is nonverbal.  It was patient sister who told the EMS that the patient is complaining of pain and difficulty walking because of her the right hip nobody knows that if she had fallen. She was sent here by the nursing home because they did a right hip fracture after the complaint but the sister ended noticed that there was hip fracture on the right side. She was sent to [facility] for the same    Interval history / Subjective:    F/u right proximal femur fracture    Patient has cerebral palsy, per chart review -she is non verbal at baseline. Per RN, intermittent trouble with PO medications persisits as patient continues to refuse. KCL changed to IV from PO. No overnight events. Level 2 process awaiting for SNF placement. No other concerns. Assessment & Plan:     Subcapital right proximal femur fracture. Status post right hip hemiarthroplasty by Ortho on 2020.  -management per Ortho, appreciate discussion with Ortho team  -PT/OT - WBAT  -lovenox for dvt prophylaxis for 21 days from surgery then ASA 81mg BID x 2 weeks after that  -Tylenol prn.  DC tramadol given hx of epilepsy - risk for lowering seizure threshold    epilepsy  -Continue Keppra-change to IV as patient refusing meds this morning     Hypertension:  -Hold amlodipine,BP wnl     cerebral palsy  -supportive care    PT/OT SNF    Code status: full   DVT prophylaxis: Lovenox    Plan: SNF    Care Plan discussed with: nurse  Disposition: [facility] and rehab, under [facility], level II awaiting. accepted at [facility]. Hospital Problems  Date Reviewed: 1/25/2020          Codes Class Noted POA    * (Principal) Hip fracture [facility]) ICD-10-CM: I83.470N  ICD-9-CM: 820.8  1/23/2020 Yes                Review of Systems:   A comprehensive review of systems was negative except for that written in the HPI. Vital Signs:    Last 24hrs VS reviewed since prior progress note. Most recent are:  Visit Vitals  /67 (BP 1 Location: Left arm, BP Patient Position: At rest)   Pulse 81   Temp 98 °F (36.7 °C)   Resp 16   Ht 5' (1.524 m)   Wt 57.3 kg (126 lb 6.4 oz)   SpO2 95%   BMI 24.69 kg/m²         Intake/Output Summary (Last 24 hours) at 1/30/2020 1905  Last data filed at 1/30/2020 1600  Gross per 24 hour   Intake    Output 750 ml   Net -750 ml        Physical Examination:             Constitutional:  non verbal,elderly patient    ENT:  Oral mucous moist, oropharynx benign. EOMI,normal conjunctva   Resp:  CTA bilaterally. No wheezing/rhonchi/rales. No accessory muscle use   CV:  Regular rhythm, normal rate    GI:  Soft, non distended, non tender. normoactive bowel sounds, no hepatosplenomegaly     Musculoskeletal:  contractures extremities    Neurologic: Awake,moves extremities spontaneously,does not follow commands           Data Review:    Review and/or order of clinical lab test      Labs:     Recent Labs     01/30/20  0628 01/28/20  0203   WBC 5.7 7.6   HGB 8.6* 9.5*   HCT 27.2* 29.1*    194     Recent Labs     01/30/20  0628 01/28/20  0203    138   K 3.0* 3.1*   * 107   CO2 25 23   BUN 6 8   CREA 0.48* 0.54*   GLU 97 111*   CA 8.7 8.7   MG 1.6  --      No results for input(s): SGOT, GPT, ALT, AP, TBIL, TBILI, TP, ALB, GLOB, GGT, AML, LPSE in the last 72 hours. No lab exists for component: AMYP, HLPSE  No results for input(s): INR, PTP, APTT, INREXT, INREXT in the last 72 hours.    No results for input(s): FE, TIBC, PSAT, FERR in the last 72 hours. No results found for: FOL, RBCF   No results for input(s): PH, PCO2, PO2 in the last 72 hours. No results for input(s): CPK, CKNDX, TROIQ in the last 72 hours.     No lab exists for component: CPKMB  No results found for: CHOL, CHOLX, CHLST, CHOLV, HDL, HDLP, LDL, LDLC, DLDLP, TGLX, TRIGL, TRIGP, CHHD, CHHDX  No results found for: GLUCPOC  Lab Results   Component Value Date/Time    Color DARK YELLOW 01/23/2020 02:30 PM    Appearance CLEAR 01/23/2020 02:30 PM    Specific gravity 1.027 01/23/2020 02:30 PM    Specific gravity 1.024 02/22/2017 09:20 AM    pH (UA) 5.5 01/23/2020 02:30 PM    Protein TRACE (A) 01/23/2020 02:30 PM    Glucose NEGATIVE  01/23/2020 02:30 PM    Ketone NEGATIVE  01/23/2020 02:30 PM    Bilirubin NEGATIVE  01/23/2020 02:30 PM    Urobilinogen 2.0 (H) 01/23/2020 02:30 PM    Nitrites NEGATIVE  01/23/2020 02:30 PM    Leukocyte Esterase TRACE (A) 01/23/2020 02:30 PM    Epithelial cells FEW 01/23/2020 02:30 PM    Bacteria 1+ (A) 01/23/2020 02:30 PM    WBC 0-4 01/23/2020 02:30 PM    RBC 0-5 01/23/2020 02:30 PM         Medications Reviewed:     Current Facility-Administered Medications   Medication Dose Route Frequency    potassium chloride 10 mEq in 100 ml IVPB  10 mEq IntraVENous Q1H    acetaminophen (TYLENOL) solution 650 mg  650 mg Oral Q6H    levETIRAcetam (KEPPRA) 1,500 mg in 0.9% sodium chloride 100 mL IVPB  1,500 mg IntraVENous DAILY    loratadine (CLARITIN) tablet 10 mg  10 mg Oral DAILY PRN    sodium chloride (NS) flush 5-40 mL  5-40 mL IntraVENous Q8H    sodium chloride (NS) flush 5-40 mL  5-40 mL IntraVENous PRN    naloxone (NARCAN) injection 0.4 mg  0.4 mg IntraVENous PRN    calcium-vitamin D (OS-BROOKLYN) 500 mg-200 unit tablet  1 Tab Oral TID WITH MEALS    senna-docusate (PERICOLACE) 8.6-50 mg per tablet 1 Tab  1 Tab Oral BID    polyethylene glycol (MIRALAX) packet 17 g  17 g Oral DAILY    bisacodyL (DULCOLAX) suppository 10 mg  10 mg Rectal DAILY PRN    traMADol (ULTRAM) tablet 50 mg  50 mg Oral Q6H PRN    hydroxyzine HCL (ATARAX) tablet 10 mg  10 mg Oral Q8H PRN    enoxaparin (LOVENOX) injection 40 mg  40 mg SubCUTAneous Q24H     ______________________________________________________________________  EXPECTED LENGTH OF STAY: 2d 4h  ACTUAL LENGTH OF STAY:          7                 Sanjana Roman MD

## 2020-01-31 NOTE — PROGRESS NOTES
ABBE: Level II screening was completed, waiting on approval of Level II and insurance auth before patient can be discharged to [facility] and rehab. [facility] patient. [facility] will provide the SNF auth and will arrange discharge transportation. CM received call from [employer] with [facility] asking about the status of Level II. CM called [facility] #[phone] option #3, then #8, then #1 to follow-up on the status of Level II, left voicemail message. CM spoke with sister Jaskaran Harrison [phone] and gave update.     Kaylen Mcnair, BSW/CRM

## 2020-01-31 NOTE — PROGRESS NOTES
Bedside and Verbal shift change report given to Zora Oquendo RN (oncoming nurse) by LINDSEY Rodriguez (offgoing nurse). Report included the following information SBAR, Kardex, OR Summary, Procedure Summary, Intake/Output, MAR and Recent Results.

## 2020-02-01 LAB
ALBUMIN SERPL-MCNC: 2.5 G/DL (ref 3.5–5)
ALBUMIN/GLOB SERPL: 0.7 {RATIO} (ref 1.1–2.2)
ALP SERPL-CCNC: 71 U/L (ref 45–117)
ALT SERPL-CCNC: 32 U/L (ref 12–78)
ANION GAP SERPL CALC-SCNC: 5 MMOL/L (ref 5–15)
AST SERPL-CCNC: 26 U/L (ref 15–37)
BILIRUB SERPL-MCNC: 0.7 MG/DL (ref 0.2–1)
BUN SERPL-MCNC: 5 MG/DL (ref 6–20)
BUN/CREAT SERPL: 10 (ref 12–20)
CALCIUM SERPL-MCNC: 9.1 MG/DL (ref 8.5–10.1)
CHLORIDE SERPL-SCNC: 108 MMOL/L (ref 97–108)
CO2 SERPL-SCNC: 25 MMOL/L (ref 21–32)
CREAT SERPL-MCNC: 0.52 MG/DL (ref 0.55–1.02)
ERYTHROCYTE [DISTWIDTH] IN BLOOD BY AUTOMATED COUNT: 13.2 % (ref 11.5–14.5)
GLOBULIN SER CALC-MCNC: 3.8 G/DL (ref 2–4)
GLUCOSE SERPL-MCNC: 89 MG/DL (ref 65–100)
HCT VFR BLD AUTO: 27.8 % (ref 35–47)
HGB BLD-MCNC: 8.7 G/DL (ref 11.5–16)
MAGNESIUM SERPL-MCNC: 1.8 MG/DL (ref 1.6–2.4)
MCH RBC QN AUTO: 26.9 PG (ref 26–34)
MCHC RBC AUTO-ENTMCNC: 31.3 G/DL (ref 30–36.5)
MCV RBC AUTO: 86.1 FL (ref 80–99)
NRBC # BLD: 0 K/UL (ref 0–0.01)
NRBC BLD-RTO: 0 PER 100 WBC
PLATELET # BLD AUTO: 303 K/UL (ref 150–400)
PMV BLD AUTO: 8.8 FL (ref 8.9–12.9)
POTASSIUM SERPL-SCNC: 3.8 MMOL/L (ref 3.5–5.1)
PROT SERPL-MCNC: 6.3 G/DL (ref 6.4–8.2)
RBC # BLD AUTO: 3.23 M/UL (ref 3.8–5.2)
SODIUM SERPL-SCNC: 138 MMOL/L (ref 136–145)
WBC # BLD AUTO: 5.7 K/UL (ref 3.6–11)

## 2020-02-01 PROCEDURE — 51798 US URINE CAPACITY MEASURE: CPT

## 2020-02-01 PROCEDURE — 97530 THERAPEUTIC ACTIVITIES: CPT

## 2020-02-01 PROCEDURE — 74011250636 HC RX REV CODE- 250/636: Performed by: INTERNAL MEDICINE

## 2020-02-01 PROCEDURE — 85027 COMPLETE CBC AUTOMATED: CPT

## 2020-02-01 PROCEDURE — 83735 ASSAY OF MAGNESIUM: CPT

## 2020-02-01 PROCEDURE — 74011250637 HC RX REV CODE- 250/637: Performed by: INTERNAL MEDICINE

## 2020-02-01 PROCEDURE — 97116 GAIT TRAINING THERAPY: CPT

## 2020-02-01 PROCEDURE — 80053 COMPREHEN METABOLIC PANEL: CPT

## 2020-02-01 PROCEDURE — 65270000029 HC RM PRIVATE

## 2020-02-01 PROCEDURE — 74011250637 HC RX REV CODE- 250/637: Performed by: PHYSICIAN ASSISTANT

## 2020-02-01 PROCEDURE — 36415 COLL VENOUS BLD VENIPUNCTURE: CPT

## 2020-02-01 PROCEDURE — 74011000258 HC RX REV CODE- 258: Performed by: INTERNAL MEDICINE

## 2020-02-01 RX ADMIN — ENOXAPARIN SODIUM 40 MG: 40 INJECTION SUBCUTANEOUS at 12:05

## 2020-02-01 RX ADMIN — Medication 10 ML: at 13:07

## 2020-02-01 RX ADMIN — LEVETIRACETAM 750 MG: 100 INJECTION, SOLUTION INTRAVENOUS at 13:07

## 2020-02-01 RX ADMIN — Medication 10 ML: at 04:46

## 2020-02-01 RX ADMIN — Medication 10 ML: at 21:23

## 2020-02-01 RX ADMIN — ACETAMINOPHEN 650 MG: 650 SOLUTION ORAL at 04:45

## 2020-02-01 RX ADMIN — LEVETIRACETAM 750 MG: 100 INJECTION, SOLUTION INTRAVENOUS at 22:35

## 2020-02-01 RX ADMIN — OYSTER SHELL CALCIUM WITH VITAMIN D 1 TABLET: 500; 200 TABLET, FILM COATED ORAL at 07:14

## 2020-02-01 NOTE — PROGRESS NOTES
Problem: Mobility Impaired (Adult and Pediatric)  Goal: *Acute Goals and Plan of Care (Insert Text)  Description  FUNCTIONAL STATUS PRIOR TO ADMISSION: Pt non-verbal and unable to provide history. Per chart, pt lives in nursing facility where she ambulated short distances with unsteady gait, used w/c for increased distance    [address]: The patient lived in [address] [place] [address] completed 1/31/2020, goals updated:  1. Patient will move supine to sit and sit to supine in bed with minimal assistance within 7 days. 2. Patient will transfer sit to stand and stand to sit with least restrictive assistive device with minimal assistance within 7 days,  3. Patient will transfer bed to chair and chair to bed with moderate assistance within 7 days. 4. Patient will ambulate with minimal assistance for 50 feet with the least restrictive assistive device within 7 days. Initiated 1/25/2020  1. Patient will move from supine to sit and sit to supine  in bed with moderate assistance  within 7 day(s). 2.  Patient will transfer from bed to chair and chair to bed with mod assistance using the least restrictive device within 7 day(s). 3.  Patient will perform sit to stand with moderate assistance  within 7 day(s). 4.  Patient will ambulate with moderate assistance  for 5 feet with the least restrictive device within 7 day(s). Outcome: Progressing Towards Goal   PHYSICAL THERAPY TREATMENT  Patient: Jose Sorto (55 y.o. female)  Date: 2/1/2020  Diagnosis: Hip fracture ([facility] [S72.009A]   Hip fracture ([facility]  Procedure(s) (LRB):  RIGHT HIP HEMIARTHROPLASTY (Right) 8 Days Post-Op  Precautions: Fall, WBAT, Seizure(nonverbal)  Chart, physical therapy assessment, plan of care and goals were reviewed. ASSESSMENT  Patient continues with skilled PT services and is progressing towards goals. Patient is received supine in bed.  She initially pulls her blankets over her face when therapist introduces herself. Patient is Max A for all functional mobility. She ambulates increased distance this session provided Max A for balance, walker management and repeated verbal and tactile cues to progress LEs in gait. Patient demonstrates narrow LUIZA and increased posterior weight shift during gait. Patient stops several times during gait and attempts to sit x1 while in door frame. With repeated verbal and tactile cues patient comes back to full standing and ambulates the rest of the way back to bed. She is Mod A for stand to sit for safety as she attempts to sit too quickly. She is Max A to roll R and L for hygiene in bed. Current Level of Function Impacting Discharge (mobility/balance): Max A for functional mobility. Other factors to consider for discharge: medical stability, increased need for assistance, decreased mobility, cognitive status          PLAN :  Patient continues to benefit from skilled intervention to address the above impairments. Continue treatment per established plan of care. to address goals. Recommendation for discharge: (in order for the patient to meet his/her long term goals)  Therapy up to 5 days/week in SNF setting    This discharge recommendation:  Has been made in collaboration with the attending provider and/or case management    IF patient discharges home will need the following DME: to be determined (TBD)       SUBJECTIVE:   Patient stated dont touch me.  - patient states after being assisted to bedside    OBJECTIVE DATA SUMMARY:   Critical Behavior:  Neurologic State: Alert  Orientation Level: Oriented to person  Cognition: Impaired decision making  Safety/Judgement: Decreased awareness of environment  Functional Mobility Training:  Bed Mobility:     Supine to Sit: Maximum assistance  Sit to Supine: Maximum assistance  Scooting: Maximum assistance        Transfers:  Sit to Stand:  Moderate assistance  Stand to Sit: Moderate assistance        Bed to Chair: Maximum assistance; Additional time                    Balance:  Sitting: Impaired; With support  Sitting - Static: Fair (occasional)  Sitting - Dynamic: Poor (constant support)  Standing: Impaired; With support  Standing - Static: Constant support; Fair  Standing - Dynamic : Constant support;Poor  Ambulation/Gait Training:  Distance (ft): 25 Feet (ft)  Assistive Device: Gait belt;Walker, rolling  Ambulation - Level of Assistance: Maximum assistance; Adaptive equipment; Additional time        Gait Abnormalities: Antalgic;Decreased step clearance; Step to gait;Trunk sway increased  Right Side Weight Bearing: As tolerated     Base of Support: Narrowed; Center of gravity altered  Stance: Right decreased  Speed/Diana: Slow;Pace decreased (<100 feet/min); Shuffled  Step Length: Right shortened;Left shortened  Swing Pattern: Right asymmetrical     Interventions: Tactile cues;Manual cues; Verbal cues           Stairs: Therapeutic Exercises:     Pain Rating:      Activity Tolerance:   Fair  Please refer to the flowsheet for vital signs taken during this treatment.     After treatment patient left in no apparent distress:   Supine in bed, Call bell within reach, Side rails x 3, and PCT present to complete positioning and hygiene     COMMUNICATION/COLLABORATION:   The patients plan of care was discussed with: Registered Nurse and student LINDSEY Edwards, PT   Time Calculation: 30 mins

## 2020-02-01 NOTE — PROGRESS NOTES
Bedside and Verbal shift change report given to Yunier (oncoming nurse) by Nita Victor (offgoing nurse). Report included the following information SBAR and Kardex.

## 2020-02-02 PROCEDURE — 77030038269 HC DRN EXT URIN PURWCK BARD -A

## 2020-02-02 PROCEDURE — 74011000258 HC RX REV CODE- 258: Performed by: INTERNAL MEDICINE

## 2020-02-02 PROCEDURE — 74011250637 HC RX REV CODE- 250/637: Performed by: PHYSICIAN ASSISTANT

## 2020-02-02 PROCEDURE — 97116 GAIT TRAINING THERAPY: CPT

## 2020-02-02 PROCEDURE — 65270000029 HC RM PRIVATE

## 2020-02-02 PROCEDURE — 74011250636 HC RX REV CODE- 250/636: Performed by: INTERNAL MEDICINE

## 2020-02-02 PROCEDURE — 74011250637 HC RX REV CODE- 250/637: Performed by: INTERNAL MEDICINE

## 2020-02-02 RX ADMIN — LEVETIRACETAM 750 MG: 100 INJECTION, SOLUTION INTRAVENOUS at 10:59

## 2020-02-02 RX ADMIN — ENOXAPARIN SODIUM 40 MG: 40 INJECTION SUBCUTANEOUS at 12:31

## 2020-02-02 RX ADMIN — Medication 10 ML: at 21:00

## 2020-02-02 RX ADMIN — LEVETIRACETAM 750 MG: 100 INJECTION, SOLUTION INTRAVENOUS at 20:56

## 2020-02-02 RX ADMIN — Medication 10 ML: at 16:24

## 2020-02-02 RX ADMIN — OYSTER SHELL CALCIUM WITH VITAMIN D 1 TABLET: 500; 200 TABLET, FILM COATED ORAL at 07:00

## 2020-02-02 RX ADMIN — Medication 10 ML: at 07:01

## 2020-02-02 NOTE — PROGRESS NOTES
[address] [facility] Adult  Hospitalist Group                                                                                          Hospitalist Progress Note  Kat Jane MD  Answering service: [phone] from in house phone        Date of Service:  2020  NAME:  Katina Villaseñor  :  1945  MRN:  [id number]      Admission Summary:   Katina Villaseñor is a 69 y.o. female who has a history of epilepsy and cerebral palsy and is presenting from a facility [facility] with hip pain. Patient is not able to give me history as she is nonverbal.  It was patient sister who told the EMS that the patient is complaining of pain and difficulty walking because of her the right hip nobody knows that if she had fallen. She was sent here by the nursing home because they did a right hip fracture after the complaint but the sister ended noticed that there was hip fracture on the right side. She was sent to [facility] for the same    Interval history / Subjective:    F/u right proximal femur fracture    Patient has cerebral palsy, per chart review -she is non verbal at baseline. Pt awake, resting quietly. No overnight events. Awaiting level II approval. No other concerns. Assessment & Plan:     Subcapital right proximal femur fracture. Status post right hip hemiarthroplasty by Ortho on 2020.  -management per Ortho, appreciate discussion with Ortho team  -PT/OT - WBAT  -lovenox for dvt prophylaxis for 21 days from surgery then ASA 81mg BID x 2 weeks after that  -Tylenol prn.  DC tramadol given hx of epilepsy - risk for lowering seizure threshold    epilepsy  -Continue Keppra-change to IV as pt is with limited PO intake     Hypertension:  -Hold amlodipine,BP wnl     cerebral palsy  -supportive care    PT/OT SNF    Code status: full   DVT prophylaxis: Lovenox    Plan: SNF    Care Plan discussed with: nurse  Disposition: [facility] and rehab, under [facility] program, level II assessment completed, insurance approval awaited. accepted at [facility]. Hospital Problems  Date Reviewed: 1/25/2020          Codes Class Noted POA    * (Principal) Hip fracture [facility]) ICD-10-CM: X28.034M  ICD-9-CM: 820.8  1/23/2020 Yes                Review of Systems:   A comprehensive review of systems was negative except for that written in the HPI. Vital Signs:    Last 24hrs VS reviewed since prior progress note. Most recent are:  Visit Vitals  /65 (BP 1 Location: Right arm, BP Patient Position: At rest)   Pulse 86   Temp 98.1 °F (36.7 °C)   Resp 16   Ht 5' (1.524 m)   Wt 61.2 kg (134 lb 14.4 oz)   SpO2 98%   BMI 26.35 kg/m²         Intake/Output Summary (Last 24 hours) at 2/1/2020 2209  Last data filed at 2/1/2020 0507  Gross per 24 hour   Intake    Output 600 ml   Net -600 ml        Physical Examination:             Constitutional:  non verbal,elderly patient    ENT:  Oral mucous moist, oropharynx benign. EOMI,normal conjunctva   Resp:  CTA bilaterally. No wheezing/rhonchi/rales. No accessory muscle use   CV:  Regular rhythm, normal rate    GI:  Soft, non distended, non tender. normoactive bowel sounds, no hepatosplenomegaly     Musculoskeletal:  contractures extremities    Neurologic: Awake,moves extremities spontaneously,does not follow commands           Data Review:    Review and/or order of clinical lab test      Labs:     Recent Labs     02/01/20 0212 01/31/20  1518   WBC 5.7 6.8   HGB 8.7* 12.5   HCT 27.8* 41.2    320     Recent Labs     02/01/20  0212 01/31/20  1518 01/30/20  0628    135* 139   K 3.8 4.6 3.0*    107 109*   CO2 25 20* 25   BUN 5* 4* 6   CREA 0.52* 0.63 0.48*   GLU 89 91 97   CA 9.1 10.2* 8.7   MG 1.8 2.0 1.6     Recent Labs     02/01/20 0212   SGOT 26   ALT 32   AP 71   TBILI 0.7   TP 6.3*   ALB 2.5*   GLOB 3.8     No results for input(s): INR, PTP, APTT, INREXT, INREXT in the last 72 hours. No results for input(s): FE, TIBC, PSAT, FERR in the last 72 hours.    No results found for: FOL, RBCF   No results for input(s): PH, PCO2, PO2 in the last 72 hours. No results for input(s): CPK, CKNDX, TROIQ in the last 72 hours.     No lab exists for component: CPKMB  No results found for: CHOL, CHOLX, CHLST, CHOLV, HDL, HDLP, LDL, LDLC, DLDLP, TGLX, TRIGL, TRIGP, CHHD, CHHDX  No results found for: GLUCPOC  Lab Results   Component Value Date/Time    Color DARK YELLOW 01/23/2020 02:30 PM    Appearance CLEAR 01/23/2020 02:30 PM    Specific gravity 1.027 01/23/2020 02:30 PM    Specific gravity 1.024 02/22/2017 09:20 AM    pH (UA) 5.5 01/23/2020 02:30 PM    Protein TRACE (A) 01/23/2020 02:30 PM    Glucose NEGATIVE  01/23/2020 02:30 PM    Ketone NEGATIVE  01/23/2020 02:30 PM    Bilirubin NEGATIVE  01/23/2020 02:30 PM    Urobilinogen 2.0 (H) 01/23/2020 02:30 PM    Nitrites NEGATIVE  01/23/2020 02:30 PM    Leukocyte Esterase TRACE (A) 01/23/2020 02:30 PM    Epithelial cells FEW 01/23/2020 02:30 PM    Bacteria 1+ (A) 01/23/2020 02:30 PM    WBC 0-4 01/23/2020 02:30 PM    RBC 0-5 01/23/2020 02:30 PM         Medications Reviewed:     Current Facility-Administered Medications   Medication Dose Route Frequency    levETIRAcetam (KEPPRA) 750 mg in 0.9% sodium chloride 100 mL IVPB  750 mg IntraVENous Q12H    acetaminophen (TYLENOL) solution 650 mg  650 mg Oral Q6H    loratadine (CLARITIN) tablet 10 mg  10 mg Oral DAILY PRN    sodium chloride (NS) flush 5-40 mL  5-40 mL IntraVENous Q8H    sodium chloride (NS) flush 5-40 mL  5-40 mL IntraVENous PRN    naloxone (NARCAN) injection 0.4 mg  0.4 mg IntraVENous PRN    calcium-vitamin D (OS-BROKOLYN) 500 mg-200 unit tablet  1 Tab Oral TID WITH MEALS    senna-docusate (PERICOLACE) 8.6-50 mg per tablet 1 Tab  1 Tab Oral BID    polyethylene glycol (MIRALAX) packet 17 g  17 g Oral DAILY    bisacodyL (DULCOLAX) suppository 10 mg  10 mg Rectal DAILY PRN    hydroxyzine HCL (ATARAX) tablet 10 mg  10 mg Oral Q8H PRN    enoxaparin (LOVENOX) injection 40 mg  40 mg SubCUTAneous Q24H     ______________________________________________________________________  EXPECTED LENGTH OF STAY: 2d 4h  ACTUAL LENGTH OF STAY:          Carlos Wiseman MD

## 2020-02-02 NOTE — PROGRESS NOTES
Problem: Mobility Impaired (Adult and Pediatric)  Goal: *Acute Goals and Plan of Care (Insert Text)  Description  FUNCTIONAL STATUS PRIOR TO ADMISSION: Pt non-verbal and unable to provide history. Per chart, pt lives in nursing facility where she ambulated short distances with unsteady gait, used w/c for increased distance    [address]: The patient lived in [address] [place] [address] completed 1/31/2020, goals updated:  1. Patient will move supine to sit and sit to supine in bed with minimal assistance within 7 days. 2. Patient will transfer sit to stand and stand to sit with least restrictive assistive device with minimal assistance within 7 days,  3. Patient will transfer bed to chair and chair to bed with moderate assistance within 7 days. 4. Patient will ambulate with minimal assistance for 50 feet with the least restrictive assistive device within 7 days. Initiated 1/25/2020  1. Patient will move from supine to sit and sit to supine  in bed with moderate assistance  within 7 day(s). 2.  Patient will transfer from bed to chair and chair to bed with mod assistance using the least restrictive device within 7 day(s). 3.  Patient will perform sit to stand with moderate assistance  within 7 day(s). 4.  Patient will ambulate with moderate assistance  for 5 feet with the least restrictive device within 7 day(s). Outcome: Progressing Towards Goal   PHYSICAL THERAPY TREATMENT  Patient: Zeenat Anderson (17 y.o. female)  Date: 2/2/2020  Diagnosis: Hip fracture ([facility] [address] [S72.009A]   Hip fracture ([facility] [address]  Procedure(s) (LRB):  RIGHT HIP HEMIARTHROPLASTY (Right) 9 Days Post-Op  Precautions: Fall, WBAT, Seizure(nonverbal)  Chart, physical therapy assessment, plan of care and goals were reviewed. ASSESSMENT  Patient continues with skilled PT services and is progressing towards goals.  Pt able to progress with her ambulation today, walking 28' with the rolling walker, WBAT RLE. Pt required mod/maximum assist for all mobility, and needs manual assistance to hold onto the walker. Will continue to follow for gait training until discharge to SNF. Current Level of Function Impacting Discharge (mobility/balance): maximum assist x 1-2 for transfers in and out of bed and to ambulate with rolling walker, WBAT RLE for short distances    Other factors to consider for discharge: pt has CP, high risk for falls, nonverbal         PLAN :  Patient continues to benefit from skilled intervention to address the above impairments. Continue treatment per established plan of care. to address goals. Recommendation for discharge: (in order for the patient to meet his/her long term goals)  Therapy up to 5 days/week in SNF setting    This discharge recommendation:  Has been made in collaboration with the attending provider and/or case management    IF patient discharges home will need the following DME: none       SUBJECTIVE:   Patient nonverbal but will occasionally utter a word. Pt cooperative with PT and participated well as able. Did not indicate any pain; moved slowly and gently with PT.    OBJECTIVE DATA SUMMARY:   Critical Behavior:  Neurologic State: Alert  Orientation Level: Unable to verbalize(patient non-verbal at baseline)  Cognition: Impaired decision making, Decreased attention/concentration, Decreased command following  Safety/Judgement: Decreased awareness of environment  Functional Mobility Training:  Bed Mobility:     Supine to Sit: Maximum assistance  Sit to Supine: Maximum assistance  Scooting: Maximum assistance        Transfers:  Sit to Stand:  Moderate assistance;Assist x2  Stand to Sit: Moderate assistance                             Balance:  Sitting: Impaired  Sitting - Static: Fair (occasional)  Sitting - Dynamic: Poor (constant support)  Standing: Impaired  Standing - Static: Constant support  Standing - Dynamic : Constant support  Ambulation/Gait Training:  Distance (ft): 35 Feet (ft)  Assistive Device: Gait belt;Walker, rolling  Ambulation - Level of Assistance: Maximum assistance;Assist x2        Gait Abnormalities: Antalgic;Decreased step clearance  Right Side Weight Bearing: As tolerated  Left Side Weight Bearing: Full  Base of Support: Narrowed     Speed/Diana: Slow  Step Length: Right shortened;Left shortened        Interventions: Verbal cues; Tactile cues;Manual cues(manual assist to hold pt's hands on walker )                         Therapeutic Exercises:   SUPINE  EXERCISES   Sets   Reps   Active Active Assist   Passive Self ROM   Comments   Ankle Pumps 1 10 [x]                                           []                                           []                                           []                                              Quad Sets   []                                           []                                           []                                           []                                              Heel Slides   []                                           []                                           []                                           []                                              Hip Abduction   []                                           []                                           []                                           []                                              Hip External Rotation   []                                           []                                           []                                           []                                              Glut Sets   []                                           []                                           []                                           []                                                 []                                           []                                           [] []                                                 []                                           []                                           []                                           []                                                  Pain Rating:  Pt did not indicate any pain nonverbally. Activity Tolerance:   Good  Please refer to the flowsheet for vital signs taken during this treatment. After treatment patient left in no apparent distress:   Sitting in bed, Heels elevated for pressure relief, Call bell within reach, and Side rails x 3. PCT to assist with feeding pt's lunch to her.     COMMUNICATION/COLLABORATION:   The patients plan of care was discussed with: Registered Nurse    Rain Franklin PT   Time Calculation: 20 mins

## 2020-02-02 NOTE — PROGRESS NOTES
[facility] Adult  Hospitalist Group                                                                                          Hospitalist Progress Note  Kinjal Rowland MD  Answering service: [phone] from in house phone        Date of Service:  2020  NAME:  Casimiro Rodriguez  :  1945  MRN:  [id number]      Admission Summary:   Casimiro Rodriguez is a 69 y.o. female who has a history of epilepsy and cerebral palsy and is presenting from a facility [facility] with hip pain. Patient is not able to give me history as she is nonverbal.  It was patient sister who told the EMS that the patient is complaining of pain and difficulty walking because of her the right hip nobody knows that if she had fallen. She was sent here by the nursing home because they did a right hip fracture after the complaint but the sister ended noticed that there was hip fracture on the right side. She was sent to [facility] for the same    Interval history / Subjective:    F/u right proximal femur fracture    Patient has cerebral palsy, per chart review -she is non verbal at baseline. Pt awake, resting quietly. No overnight events. Awaiting level II approval. No other concerns. Was up and worked with PT as well. No other concerns per nursing on the floor. Assessment & Plan:     Subcapital right proximal femur fracture. Status post right hip hemiarthroplasty by Ortho on 2020.  -management per Ortho, appreciate discussion with Ortho team  -PT/OT - WBAT  -lovenox for dvt prophylaxis for 21 days from surgery then ASA 81mg BID x 2 weeks after that  -Tylenol prn.  DC tramadol given hx of epilepsy - risk for lowering seizure threshold    epilepsy  -Continue Keppra-change to IV as pt is with limited PO intake     Hypertension:  -Hold amlodipine,BP wnl     cerebral palsy  -supportive care    PT/OT SNF    Code status: full   DVT prophylaxis: Lovenox    Plan: SNF    Care Plan discussed with: nurse  Disposition: [facility] and rehab, under [facility], level II assessment completed, insurance approval awaited. accepted at [facility]. Hospital Problems  Date Reviewed: 1/25/2020          Codes Class Noted POA    * (Principal) Hip fracture [facility]) ICD-10-CM: D30.545U  ICD-9-CM: 820.8  1/23/2020 Yes                Review of Systems:   A comprehensive review of systems was negative except for that written in the HPI. Vital Signs:    Last 24hrs VS reviewed since prior progress note. Most recent are:  Visit Vitals  /58 (BP 1 Location: Left leg, BP Patient Position: At rest)   Pulse 91   Temp 98 °F (36.7 °C)   Resp 17   Ht 5' (1.524 m)   Wt 61.2 kg (134 lb 14.4 oz)   SpO2 99%   BMI 26.35 kg/m²         Intake/Output Summary (Last 24 hours) at 2/2/2020 1813  Last data filed at 2/2/2020 1305  Gross per 24 hour   Intake    Output 800 ml   Net -800 ml        Physical Examination:             Constitutional:  non verbal,elderly patient    ENT:  Oral mucous moist, oropharynx benign. EOMI,normal conjunctva   Resp:  CTA bilaterally. No wheezing/rhonchi/rales. No accessory muscle use   CV:  Regular rhythm, normal rate    GI:  Soft, non distended, non tender. normoactive bowel sounds, no hepatosplenomegaly     Musculoskeletal:  contractures extremities    Neurologic: Awake,moves extremities spontaneously,does not follow commands           Data Review:    Review and/or order of clinical lab test      Labs:     Recent Labs     02/01/20 0212 01/31/20  1518   WBC 5.7 6.8   HGB 8.7* 12.5   HCT 27.8* 41.2    320     Recent Labs     02/01/20  0212 01/31/20  1518    135*   K 3.8 4.6    107   CO2 25 20*   BUN 5* 4*   CREA 0.52* 0.63   GLU 89 91   CA 9.1 10.2*   MG 1.8 2.0     Recent Labs     02/01/20 0212   SGOT 26   ALT 32   AP 71   TBILI 0.7   TP 6.3*   ALB 2.5*   GLOB 3.8     No results for input(s): INR, PTP, APTT, INREXT, INREXT in the last 72 hours.    No results for input(s): FE, TIBC, PSAT, FERR in the last 72 hours. No results found for: FOL, RBCF   No results for input(s): PH, PCO2, PO2 in the last 72 hours. No results for input(s): CPK, CKNDX, TROIQ in the last 72 hours.     No lab exists for component: CPKMB  No results found for: CHOL, CHOLX, CHLST, CHOLV, HDL, HDLP, LDL, LDLC, DLDLP, TGLX, TRIGL, TRIGP, CHHD, CHHDX  No results found for: GLUCPOC  Lab Results   Component Value Date/Time    Color DARK YELLOW 01/23/2020 02:30 PM    Appearance CLEAR 01/23/2020 02:30 PM    Specific gravity 1.027 01/23/2020 02:30 PM    Specific gravity 1.024 02/22/2017 09:20 AM    pH (UA) 5.5 01/23/2020 02:30 PM    Protein TRACE (A) 01/23/2020 02:30 PM    Glucose NEGATIVE  01/23/2020 02:30 PM    Ketone NEGATIVE  01/23/2020 02:30 PM    Bilirubin NEGATIVE  01/23/2020 02:30 PM    Urobilinogen 2.0 (H) 01/23/2020 02:30 PM    Nitrites NEGATIVE  01/23/2020 02:30 PM    Leukocyte Esterase TRACE (A) 01/23/2020 02:30 PM    Epithelial cells FEW 01/23/2020 02:30 PM    Bacteria 1+ (A) 01/23/2020 02:30 PM    WBC 0-4 01/23/2020 02:30 PM    RBC 0-5 01/23/2020 02:30 PM         Medications Reviewed:     Current Facility-Administered Medications   Medication Dose Route Frequency    levETIRAcetam (KEPPRA) 750 mg in 0.9% sodium chloride 100 mL IVPB  750 mg IntraVENous Q12H    acetaminophen (TYLENOL) solution 650 mg  650 mg Oral Q6H    loratadine (CLARITIN) tablet 10 mg  10 mg Oral DAILY PRN    sodium chloride (NS) flush 5-40 mL  5-40 mL IntraVENous Q8H    sodium chloride (NS) flush 5-40 mL  5-40 mL IntraVENous PRN    naloxone (NARCAN) injection 0.4 mg  0.4 mg IntraVENous PRN    calcium-vitamin D (OS-BROOKLYN) 500 mg-200 unit tablet  1 Tab Oral TID WITH MEALS    senna-docusate (PERICOLACE) 8.6-50 mg per tablet 1 Tab  1 Tab Oral BID    polyethylene glycol (MIRALAX) packet 17 g  17 g Oral DAILY    bisacodyL (DULCOLAX) suppository 10 mg  10 mg Rectal DAILY PRN    hydroxyzine HCL (ATARAX) tablet 10 mg  10 mg Oral Q8H PRN    enoxaparin (LOVENOX) injection 40 mg  40 mg SubCUTAneous Q24H     ______________________________________________________________________  EXPECTED LENGTH OF STAY: 2d 4h  ACTUAL LENGTH OF STAY:          Antonio Martinez MD

## 2020-02-02 NOTE — PROGRESS NOTES
Problem: Falls - Risk of  Goal: *Absence of Falls  Description  Document Tia Manus Fall Risk and appropriate interventions in the flowsheet. Outcome: Progressing Towards Goal  Note: Fall Risk Interventions:  Mobility Interventions: Communicate number of staff needed for ambulation/transfer, Patient to call before getting OOB    Mentation Interventions: Door open when patient unattended, Evaluate medications/consider consulting pharmacy, More frequent rounding, Room close to nurse's station    Medication Interventions: Evaluate medications/consider consulting pharmacy, Patient to call before getting OOB, Teach patient to arise slowly    Elimination Interventions: Call light in reach, Patient to call for help with toileting needs, Toileting schedule/hourly rounds    History of Falls Interventions: Door open when patient unattended, Evaluate medications/consider consulting pharmacy    Problem: Pressure Injury - Risk of  Goal: *Prevention of pressure injury  Description  Document Romeo Scale and appropriate interventions in the flowsheet. Outcome: Progressing Towards Goal  Note: Pressure Injury Interventions:  Sensory Interventions: Assess changes in LOC, Float heels, Keep linens dry and wrinkle-free    Moisture Interventions: Absorbent underpads, Maintain skin hydration (lotion/cream), Apply protective barrier, creams and emollients    Activity Interventions: Increase time out of bed, Pressure redistribution bed/mattress(bed type), PT/OT evaluation    Mobility Interventions: Float heels, HOB 30 degrees or less, Pressure redistribution bed/mattress (bed type), PT/OT evaluation, Turn and reposition approx.  every two hours(pillow and wedges)    Nutrition Interventions: Document food/fluid/supplement intake    Friction and Shear Interventions: HOB 30 degrees or less, Lift sheet, Minimize layers

## 2020-02-02 NOTE — PROGRESS NOTES
Bedside shift change report given to Ingrid Jensen (oncoming nurse) by Aditi Bates (offgoing nurse). Report included the following information SBAR, Kardex, Intake/Output and MAR.

## 2020-02-03 PROCEDURE — 74011250636 HC RX REV CODE- 250/636: Performed by: INTERNAL MEDICINE

## 2020-02-03 PROCEDURE — 65270000029 HC RM PRIVATE

## 2020-02-03 PROCEDURE — 74011250637 HC RX REV CODE- 250/637: Performed by: PHYSICIAN ASSISTANT

## 2020-02-03 PROCEDURE — 77030038269 HC DRN EXT URIN PURWCK BARD -A

## 2020-02-03 PROCEDURE — 74011000258 HC RX REV CODE- 258: Performed by: INTERNAL MEDICINE

## 2020-02-03 PROCEDURE — 97535 SELF CARE MNGMENT TRAINING: CPT | Performed by: OCCUPATIONAL THERAPIST

## 2020-02-03 PROCEDURE — 97116 GAIT TRAINING THERAPY: CPT

## 2020-02-03 RX ADMIN — LEVETIRACETAM 750 MG: 100 INJECTION, SOLUTION INTRAVENOUS at 21:07

## 2020-02-03 RX ADMIN — OYSTER SHELL CALCIUM WITH VITAMIN D 1 TABLET: 500; 200 TABLET, FILM COATED ORAL at 06:40

## 2020-02-03 RX ADMIN — Medication 10 ML: at 04:27

## 2020-02-03 RX ADMIN — LEVETIRACETAM 750 MG: 100 INJECTION, SOLUTION INTRAVENOUS at 08:24

## 2020-02-03 RX ADMIN — OYSTER SHELL CALCIUM WITH VITAMIN D 1 TABLET: 500; 200 TABLET, FILM COATED ORAL at 16:42

## 2020-02-03 RX ADMIN — ENOXAPARIN SODIUM 40 MG: 40 INJECTION SUBCUTANEOUS at 12:30

## 2020-02-03 RX ADMIN — OYSTER SHELL CALCIUM WITH VITAMIN D 1 TABLET: 500; 200 TABLET, FILM COATED ORAL at 12:30

## 2020-02-03 RX ADMIN — Medication 10 ML: at 08:24

## 2020-02-03 RX ADMIN — Medication 10 ML: at 21:07

## 2020-02-03 RX ADMIN — SENNOSIDES AND DOCUSATE SODIUM 1 TABLET: 8.6; 5 TABLET ORAL at 10:32

## 2020-02-03 NOTE — PROGRESS NOTES
Problem: Mobility Impaired (Adult and Pediatric)  Goal: *Acute Goals and Plan of Care (Insert Text)  Description  FUNCTIONAL STATUS PRIOR TO ADMISSION: Pt non-verbal and unable to provide history. Per chart, pt lives in nursing facility where she ambulated short distances with unsteady gait, used w/c for increased distance    [address]: The patient lived in [address] [place] [address] completed 1/31/2020, goals updated:  1. Patient will move supine to sit and sit to supine in bed with minimal assistance within 7 days. 2. Patient will transfer sit to stand and stand to sit with least restrictive assistive device with minimal assistance within 7 days,  3. Patient will transfer bed to chair and chair to bed with moderate assistance within 7 days. 4. Patient will ambulate with minimal assistance for 50 feet with the least restrictive assistive device within 7 days. Initiated 1/25/2020  1. Patient will move from supine to sit and sit to supine  in bed with moderate assistance  within 7 day(s). 2.  Patient will transfer from bed to chair and chair to bed with mod assistance using the least restrictive device within 7 day(s). 3.  Patient will perform sit to stand with moderate assistance  within 7 day(s). 4.  Patient will ambulate with moderate assistance  for 5 feet with the least restrictive device within 7 day(s). Outcome: Progressing Towards Goal   PHYSICAL THERAPY TREATMENT  Patient: Leisa Hatch (36 y.o. female)  Date: 2/3/2020  Diagnosis: Hip fracture ([facility] [S72.009A]   Hip fracture ([facility]  Procedure(s) (LRB):  RIGHT HIP HEMIARTHROPLASTY (Right) 10 Days Post-Op  Precautions: Fall, WBAT, Seizure(nonverbal)  Chart, physical therapy assessment, plan of care and goals were reviewed. ASSESSMENT  Patient continues with skilled PT services and is progressing towards goals.  She was in bed on arrival, and she did not resist getting OOB but did need max assist to manage her legs and trunk. She was able to ambulate about 39' three times with seated rest breaks after each. She needs assist to guide the walker and for balance, but she is initiating steps consistently and her steps are fairly symmetrical.  As her stride lengthens, she tends towards scissoring, but still able to maintain a reciprocating gait. She should be OOB in the chair every day with nursing and recommend some timed toileting since that is what she was doing at home, according to sister. Continue to recommend SNF level rehab until she is back to her baseline ambulation and able to return home with family. Current Level of Function Impacting Discharge (mobility/balance): mod assist overall for short distance ambulation with RW    Other factors to consider for discharge: CP, nonverbal         PLAN :  Patient continues to benefit from skilled intervention to address the above impairments. Continue treatment per established plan of care. to address goals. Recommendation for discharge: (in order for the patient to meet his/her long term goals)  Therapy up to 5 days/week in SNF setting    This discharge recommendation:  Has been made in collaboration with the attending provider and/or case management    IF patient discharges home will need the following DME: to be determined (TBD)       SUBJECTIVE:   Patient nonverbal overall    OBJECTIVE DATA SUMMARY:   Critical Behavior:  Neurologic State: Alert  Orientation Level: Unable to verbalize(non-verbal)  Cognition: No command following, Poor safety awareness, Impaired decision making  Safety/Judgement: Decreased awareness of environment  Functional Mobility Training:  Bed Mobility:     Supine to Sit: Maximum assistance  Sit to Supine: (up in chair after)           Transfers:  Sit to Stand: Moderate assistance; Adaptive equipment; Additional time  Stand to Sit: Minimum assistance; Adaptive equipment; Additional time(improved ability to step backwards compared to previously)        Bed to Chair: Moderate assistance; Additional time; Adaptive equipment                    Balance:  Sitting: Impaired; Without support  Sitting - Static: Fair (occasional)  Sitting - Dynamic: Fair (occasional)  Standing: Impaired  Standing - Static: Fair;Constant support  Standing - Dynamic : Constant support;Poor  Ambulation/Gait Training:  Distance (ft): 40 Feet (ft)(three times with seated rest between each)  Assistive Device: Gait belt;Walker, rolling  Ambulation - Level of Assistance: Moderate assistance; Additional time; Adaptive equipment(to guide the walker, she initiates steps consistently)        Gait Abnormalities: Decreased step clearance; Path deviations(tends towards scissoring)        Base of Support: Narrowed     Speed/Diana: Pace decreased (<100 feet/min); Slow  Step Length: Left shortened;Right shortened  Swing Pattern: Right asymmetrical;Left asymmetrical                 Stairs: Therapeutic Exercises:     Pain Rating:  Does not appear to have pain with weight bearing on the RLE    Activity Tolerance:   Fair and requires rest breaks  Please refer to the flowsheet for vital signs taken during this treatment.     After treatment patient left in no apparent distress:   Sitting in chair, Call bell within reach, and Bed / chair alarm activated    COMMUNICATION/COLLABORATION:   The patients plan of care was discussed with: Occupational Therapist, Registered Nurse, and     Daisy Li, PT   Time Calculation: 26 mins

## 2020-02-03 NOTE — PROGRESS NOTES
Bedside shift change report given to [facility], RN (oncoming nurse) by Mahamed Morris RN (offgoing nurse). Report included the following information SBAR, Kardex, Intake/Output, MAR and Recent Results.

## 2020-02-03 NOTE — PROGRESS NOTES
NUTRITION       Pureed diet; Boost pudding BID + Magic Cup x 1 day. Appetite/intake remain poor; Does not self-initiate eating or drinking. OT noted out-of-bed for meals, but continues to need assistance to eat. Although ONS appropriate with poor intake, not consistently accepted. RD attempted to give pt Boost pudding and accepted few bites, then stopped. Multiple orange soda cans on bedside table to encourage to drink. Pureed diet remains appropriate. Per sister, takes Rx with applesauce at home, and had been feeding self with assist PTA. Previous goal not met as intake remains poor and ONS inconsistent. Care management noted. Goals:          Accept 100% ONS minimum BID and minimum 25% all meals through discharge. Plan to continue staff assist with meals and current ONS TID all trays.      Kip Owens RD

## 2020-02-03 NOTE — PROGRESS NOTES
ABBE: Level II screening was completed, waiting on approval of Level II and insurance auth before patient can be discharged to [facility] and rehab. [facility] patient. [facility] will provide the SNF auth and will arrange discharge transportation. CM called [facility] [phone] and spoke with [address]. The assessment was sent to state this morning, they will likely have it done by Wednesday. [address] stated to call back if we have not received the paperwork by Wednesday.     Corrina Fox, BSW/CRM

## 2020-02-03 NOTE — PROGRESS NOTES
Problem: Self Care Deficits Care Plan (Adult)  Goal: *Acute Goals and Plan of Care (Insert Text)  Description  Occupational Therapy Goals  Initiated: 1/26/2020. Reviewed 1/31/20 and all goals remain appropriate to be met over the next 7 days. 1.  Patient will perform grooming with min A sitting in chair/chair position in bed within 7 day(s). 2.  Patient will perform upper body dressing with min A within 7 days. 3.  Patient will perform self feeding for 25% of her meals within 7 day(s). 4.  Patient will perform toilet transfers with mod A within 7 day(s). 5.  Patient will perform all aspects of toileting with mod A within 7 day(s). FUNCTIONAL STATUS PRIOR TO ADMISSION: Pt lives with her sister Evie Dotson ([phone]). Contacted sister this morning to provided PLOF as chart was confusing. Patient lives with her sister and [facility] support at home in the morning time to help prepare her for [facility] which is adult day care center. Pt is minimally ambulatory but does proper her w/c independently using her feet. She does ambulate in the home short distances. She is able to assist with upper body dressing but is total for LB ADL. She is total A for bathing activities. She does transfer to and from commode with supervision to min A. She goes to day care 5 days per week. She is nonverbal at baseline. She has a ramp on her home and does not require to climb stairs. All meals are provided by family and facility. She eats only using a spoon. At times, she is assisted with feeding due to time constraints for  in the mornings but she is able to feed herself. She only has a w/c at home and no other functioning equipment.      HOME SUPPORT: see above      Outcome: Progressing Towards Goal     OCCUPATIONAL THERAPY TREATMENT  Patient: Jone Campos (51 y.o. female)  Date: 2/3/2020  Diagnosis: Hip fracture ([facility] [S72.009A]   Hip fracture ([facility]  Procedure(s) (LRB):  RIGHT HIP HEMIARTHROPLASTY (Right) 10 Days Post-Op  Precautions: Fall, WBAT, Seizure(nonverbal)  Chart, occupational therapy assessment, plan of care, and goals were reviewed. ASSESSMENT  Patient continues with skilled OT services and is progressing towards goals. Pt making slow, steady progress towards goals. Bathroom mobility this session with mod A and despite max assist pt required total A for toileting and hand washing at sink. Recommend return home with sister and [facility] program at discharge. Current Level of Function Impacting Discharge (ADLs): total A for ADLs, mod A for functional mobility using RW to amb and total A to manage RW    Other factors to consider for discharge: Pt's sister provides 24/7 assist         PLAN :  Patient continues to benefit from skilled intervention to address the above impairments. Continue treatment per established plan of care. to address goals. Recommend with staff: OOB to chair for all meals    Recommend next OT session: self feeding, toilet transfers    Recommendation for discharge: (in order for the patient to meet his/her long term goals)  Occupational therapy at least 2 days/week in the home AND ensure assist and/or supervision for safety for safety      This discharge recommendation:  Has not yet been discussed the attending provider and/or case management    IF patient discharges home will need the following DME: TBD       SUBJECTIVE:   Patient with no verbalizations    OBJECTIVE DATA SUMMARY:   Cognitive/Behavioral Status:  Neurologic State: Alert  Orientation Level: Unable to verbalize  Cognition: Decreased attention/concentration;Decreased command following;Poor safety awareness  Perception: Appears intact  Perseveration: No perseveration noted  Safety/Judgement: Decreased awareness of need for assistance;Decreased awareness of environment;Decreased awareness of need for safety;Decreased insight into deficits; Fall prevention    Functional Mobility and Transfers for ADLs:  Bed Mobility:  Supine to Sit: Maximum assistance  Sit to Supine: (up in chair after)    Transfers:  Sit to Stand: Moderate assistance; Adaptive equipment; Additional time  Functional Transfers  Bathroom Mobility: Moderate assistance(RW)  Toilet Transfer : Moderate assistance; Additional time;Assist x1  Cues: Physical assistance; Tactile cues provided;Verbal cues provided;Visual cues provided  Adaptive Equipment: Grab bars; Walker (comment)(rolling)  Bed to Chair: Moderate assistance; Additional time; Adaptive equipment    Balance:  Sitting: Impaired; Without support  Sitting - Static: Fair (occasional)  Sitting - Dynamic: Fair (occasional)  Standing: Impaired  Standing - Static: Fair;Constant support  Standing - Dynamic : Constant support;Poor    ADL Intervention:  Toileting  Toileting Assistance: Total assistance(dependent)  Bladder Hygiene: Total assistance (dependent)  Clothing Management: Total assistance (dependent)  Cues: Tactile cues provided;Verbal cues provided;Visual cues provided  Adaptive Equipment: Walker    Cognitive Retraining  Safety/Judgement: Decreased awareness of need for assistance;Decreased awareness of environment;Decreased awareness of need for safety;Decreased insight into deficits; Fall prevention    Pain:  No signs of pain noted    Activity Tolerance:   Poor  Please refer to the flowsheet for vital signs taken during this treatment.     After treatment patient left in no apparent distress:   Sitting in chair, Call bell within reach, and Bed / chair alarm activated    COMMUNICATION/COLLABORATION:   The patients plan of care was discussed with: Physical Therapist and Registered Nurse    Vijay Rankin OT  Time Calculation: 16 mins

## 2020-02-03 NOTE — PROGRESS NOTES
Nurse Xin Clements gave bedside report to oncoming nurse Kerrie Blandon RN by [employer] and Kardex

## 2020-02-04 VITALS
HEIGHT: 60 IN | DIASTOLIC BLOOD PRESSURE: 68 MMHG | WEIGHT: 134.9 LBS | TEMPERATURE: 98.2 F | SYSTOLIC BLOOD PRESSURE: 122 MMHG | RESPIRATION RATE: 16 BRPM | BODY MASS INDEX: 26.48 KG/M2 | OXYGEN SATURATION: 99 % | HEART RATE: 66 BPM

## 2020-02-04 PROCEDURE — 74011250637 HC RX REV CODE- 250/637: Performed by: INTERNAL MEDICINE

## 2020-02-04 PROCEDURE — 77030038269 HC DRN EXT URIN PURWCK BARD -A

## 2020-02-04 RX ORDER — AMOXICILLIN 250 MG
1 CAPSULE ORAL 2 TIMES DAILY
Qty: 30 TAB | Refills: 0 | Status: SHIPPED
Start: 2020-02-04

## 2020-02-04 RX ORDER — LEVETIRACETAM 750 MG/1
750 TABLET ORAL 2 TIMES DAILY
Qty: 60 TAB | Refills: 0 | Status: SHIPPED
Start: 2020-02-04

## 2020-02-04 RX ORDER — ASPIRIN 81 MG/1
81 TABLET ORAL 2 TIMES DAILY
Qty: 28 TAB | Refills: 0 | Status: SHIPPED
Start: 2020-02-15 | End: 2020-02-29

## 2020-02-04 RX ORDER — POLYETHYLENE GLYCOL 3350 17 G/17G
17 POWDER, FOR SOLUTION ORAL DAILY
Qty: 30 EACH | Refills: 0 | Status: SHIPPED
Start: 2020-02-05

## 2020-02-04 RX ORDER — ACETAMINOPHEN 325 MG/1
650 TABLET ORAL EVERY 6 HOURS
Qty: 90 TAB | Refills: 0 | Status: SHIPPED
Start: 2020-02-04

## 2020-02-04 RX ORDER — ENOXAPARIN SODIUM 100 MG/ML
40 INJECTION SUBCUTANEOUS EVERY 24 HOURS
Qty: 4 ML | Refills: 0 | Status: SHIPPED
Start: 2020-02-04 | End: 2020-02-14

## 2020-02-04 RX ORDER — ACETAMINOPHEN 325 MG/1
650 TABLET ORAL EVERY 6 HOURS
Status: DISCONTINUED | OUTPATIENT
Start: 2020-02-04 | End: 2020-02-04 | Stop reason: HOSPADM

## 2020-02-04 RX ORDER — FERROUS SULFATE, DRIED 160(50) MG
1 TABLET, EXTENDED RELEASE ORAL
Qty: 60 TAB | Refills: 0 | Status: SHIPPED
Start: 2020-02-04

## 2020-02-04 RX ADMIN — LEVETIRACETAM 750 MG: 500 TABLET ORAL at 09:59

## 2020-02-04 NOTE — PROGRESS NOTES
Problem: Falls - Risk of  Goal: *Absence of Falls  Description  Document Canvas Gary Fall Risk and appropriate interventions in the flowsheet. Outcome: Progressing Towards Goal  Note: Fall Risk Interventions:  Mobility Interventions: Communicate number of staff needed for ambulation/transfer, Patient to call before getting OOB, PT Consult for mobility concerns, PT Consult for assist device competence, Strengthening exercises (ROM-active/passive), Utilize walker, cane, or other assistive device    Mentation Interventions: Door open when patient unattended, Evaluate medications/consider consulting pharmacy, More frequent rounding, Reorient patient    Medication Interventions: Evaluate medications/consider consulting pharmacy, Patient to call before getting OOB, Teach patient to arise slowly    Elimination Interventions: Call light in reach, Patient to call for help with toileting needs, Toileting schedule/hourly rounds    History of Falls Interventions: Evaluate medications/consider consulting pharmacy         Problem: Pressure Injury - Risk of  Goal: *Prevention of pressure injury  Description  Document Romeo Scale and appropriate interventions in the flowsheet.   Outcome: Progressing Towards Goal  Note: Pressure Injury Interventions:  Sensory Interventions: Assess changes in LOC, Chair cushion, Float heels, Keep linens dry and wrinkle-free, Minimize linen layers    Moisture Interventions: Absorbent underpads, Limit adult briefs, Minimize layers    Activity Interventions: Increase time out of bed, Pressure redistribution bed/mattress(bed type), PT/OT evaluation    Mobility Interventions: Float heels, HOB 30 degrees or less, Pressure redistribution bed/mattress (bed type), PT/OT evaluation    Nutrition Interventions: Document food/fluid/supplement intake    Friction and Shear Interventions: Foam dressings/transparent film/skin sealants, HOB 30 degrees or less, Lift sheet, Minimize layers

## 2020-02-04 NOTE — PROGRESS NOTES
1008:  RN attempted to call report. RN sent to voicemail. 1010:  RN attempted to try report again, was transferred but continued to ring for 7 minutes, individual answered phone then placed back on hold. RN waited 5 more minutes before hanging up. 1028:  RN attempted to call report. 1045:    Hospital to SNF SBAR Handoff - Benito Navack                                                                        69 y.o.   female    Teresa Chatterjee   Room: [address] [place] [address] Anabel Vera  Unit Phone# :  [phone]      ST. [address]  [address] [place] [address]  Dept: [phone]  Loc: [phone]                    SITUATION     Admitted:  1/23/2020         Attending Provider:  Merlene Dickens MD       Consultations:  IP CONSULT TO ORTHOPEDIC SURGERY  IP CONSULT TO HOSPITALIST    PCP:  None   None    Treatment Team: Attending Provider: Merlene Dickens MD; Utilization Review: Sweetie Braden; Care Manager: Zelda Thomas    Ad[facility]: [facility] [S72.009A]       Principal Problem: Hip fracture ([facility]    11 Days Post-Op of   Procedure(s):  RIGHT HIP HEMIARTHROPLASTY   BY: Nancy Mathur MD             ON: 1/24/2020                  Code Status: Full Code                Advance Directives: No flowsheet data found. (Send w/patient)   No Doesnt Have       Isolation:  There are currently no Active Isolations       MDRO: No current active infections    Pain Medications given: Patient has been refusing    Special Equipment needed: yes  Type of equipment: gait belt and walker         BACKGROUND     Allergies:  No Known Allergies    Past Medical History:   Diagnosis Date    Epilepsy ([facility]     Gait instability     Hypertension     Neurological disorder     Seizures ([facility]     UTI (urinary tract infection)     Vitamin D deficiency        History reviewed. No pertinent surgical history.     Medications Prior to Admission   Medication Sig    bisacodyL (DULCOLAX, BISACODYL,) 10 mg suppository Insert 10 mg into rectum daily as needed.  docusate sodium (COLACE) 100 mg capsule Take 100 mg by mouth daily.  levETIRAcetam (KEPPRA XR) 750 mg ER tablet Take 1,500 mg by mouth daily.  loratadine (CLARITIN) 10 mg tablet Take 10 mg by mouth daily as needed (Congestion, cough).  acetaminophen (TYLENOL) 160 mg/5 mL liquid Take 640 mg by mouth three (3) times daily as needed for Fever or Pain (Mild pain (PSR 1-3), low fever).  amLODIPine (NORVASC) 2.5 mg tablet Take 5 mg by mouth daily. Hard scripts included in transfer packet: no    Vaccinations: There is no immunization history on file for this patient. Readmission Risks: 10%   Known Risks: Risk of falls        The Charlson CoMorbitiy Index tool is an evidenced based tool that has more automatic generated information. The tool looks at many different items such as the age of the patient, how many times they were admitted in the last calendar year, current length of stay in the hospital and their diagnosis. All of these items are pulled automatically from information documented in the chart from various places and will generate a score that predicts whether a patient is at low (less than 13), medium (13-20) or high (21 or greater) risk of being readmitted.         ASSESSMENT                Temp: 98.2 °F (36.8 °C) (02/04/20 1002) Pulse (Heart Rate): 66 (02/04/20 1002)     Resp Rate: 16 (02/04/20 1002)           BP: 122/68 (02/04/20 1002)     O2 Sat (%): 99 % (02/04/20 1002)     Weight: 61.2 kg (134 lb 14.4 oz)    Height: 5' (152.4 cm) (01/27/20 0935)    Active Orders   Diet    DIET PUREED         Orientation: Unable to verbalize  Active Behaviors: None                                   Active Lines/Drains:  (Peg Tube / [place] / CL or S/L?): no    Urinary Status: External catheter, Incontinent briefs, Incontinent     Last BM: Last Bowel Movement Date: 02/03/20     Skin Integrity: Incision (comment)(R hip) Mobility: Very limited   Weight Bearing Status: WBAT (Weight Bearing as Tolerated)      Gait Training  Assistive Device: Gait belt, Walker, rolling  Ambulation - Level of Assistance:  Moderate assistance, Additional time, Adaptive equipment(to guide the walker, she initiates steps consistently)  Distance (ft): 40 Feet (ft)(three times with seated rest between each)  Interventions: Verbal cues, Tactile cues, Manual cues(manual assist to hold pt's hands on walker )         Lab Results   Component Value Date/Time    Glucose 89 02/01/2020 02:12 AM    INR 1.0 01/23/2020 04:31 PM    HGB 8.7 (L) 02/01/2020 02:12 AM    HGB 12.5 01/31/2020 03:18 PM        RECOMMENDATION     See After Visit Summary (AVS) for:  · Discharge instructions  · After [address]   · Special equipment needed (entered pre-discharge by Care Management)  · Medication Reconciliation    · Follow up Appointment(s)         Report given/sent by:  Celia Lewis                    Verbal report given to: Lakeshia Ramirez [facility])         Estimated discharge time:  2/4/2020 at 1200

## 2020-02-04 NOTE — PROGRESS NOTES
Bedside and Verbal shift change report given to Greer Vogel RN (oncoming nurse) by Micheline Hodge RN (offgoing nurse). Report included the following information SBAR, Kardex, Intake/Output, MAR and Recent Results.

## 2020-02-04 NOTE — PROGRESS NOTES
[address] [facility] Adult  Hospitalist Group                                                                                          Hospitalist Progress Note  Patricia Naik MD  Answering service: [phone] OR 36 from in house phone        Date of Service:  2/3/2020  NAME:  Minerva Antunez  :  1945  MRN:  [id number]      Admission Summary:   Minerva Antunez is a 69 y.o. female who has a history of epilepsy and cerebral palsy and is presenting from a facility [facility] with hip pain. Patient is not able to give me history as she is nonverbal.  It was patient sister who told the EMS that the patient is complaining of pain and difficulty walking because of her the right hip nobody knows that if she had fallen. She was sent here by the nursing home because they did a right hip fracture after the complaint but the sister ended noticed that there was hip fracture on the right side. She was sent to [facility] for the same    Interval history / Subjective:    F/u right proximal femur fracture    Patient has cerebral palsy, per chart review -she is non verbal at baseline. Pt awake, resting quietly. Sitting up in chair. Minimal interaction. No overnight events. Awaiting level II approval. No other concerns. Assessment & Plan:     Subcapital right proximal femur fracture. Status post right hip hemiarthroplasty by Ortho on 2020.  -management per Ortho, appreciate discussion with Ortho team  -PT/OT - WBAT  -lovenox for dvt prophylaxis for 21 days from surgery then ASA 81mg BID x 2 weeks after that  -Tylenol prn.  DC tramadol given hx of epilepsy - risk for lowering seizure threshold    epilepsy  -Continue Keppra-change to po solution     Hypertension:  -Hold amlodipine,BP wnl     cerebral palsy  -supportive care    PT/OT SNF    Code status: full   DVT prophylaxis: Lovenox    Plan: SNF    Care Plan discussed with: nurse  Disposition: [facility]hab, [facility], level II assessment completed, insurance approval awaited. accepted at [facility]. Hospital Problems  Date Reviewed: 1/25/2020          Codes Class Noted POA    * (Principal) Hip fracture [facility]) ICD-10-CM: Y46.222O  ICD-9-CM: 820.8  1/23/2020 Yes                Review of Systems:   A comprehensive review of systems was negative except for that written in the HPI. Vital Signs:    Last 24hrs VS reviewed since prior progress note. Most recent are:  Visit Vitals  /73   Pulse 77   Temp 97.5 °F (36.4 °C)   Resp 16   Ht 5' (1.524 m)   Wt 61.2 kg (134 lb 14.4 oz)   SpO2 95%   BMI 26.35 kg/m²         Intake/Output Summary (Last 24 hours) at 2/3/2020 2118  Last data filed at 2/3/2020 1408  Gross per 24 hour   Intake    Output 450 ml   Net -450 ml        Physical Examination:             Constitutional:  non verbal,elderly patient    ENT:  Oral mucous moist, oropharynx benign. EOMI,normal conjunctva   Resp:  CTA bilaterally. No wheezing/rhonchi/rales. No accessory muscle use   CV:  Regular rhythm, normal rate    GI:  Soft, non distended, non tender. normoactive bowel sounds, no hepatosplenomegaly     Musculoskeletal:  contractures extremities    Neurologic: Awake,moves extremities spontaneously,does not follow commands           Data Review:    Review and/or order of clinical lab test      Labs:     Recent Labs     02/01/20 0212   WBC 5.7   HGB 8.7*   HCT 27.8*        Recent Labs     02/01/20 0212      K 3.8      CO2 25   BUN 5*   CREA 0.52*   GLU 89   CA 9.1   MG 1.8     Recent Labs     02/01/20 0212   SGOT 26   ALT 32   AP 71   TBILI 0.7   TP 6.3*   ALB 2.5*   GLOB 3.8     No results for input(s): INR, PTP, APTT, INREXT, INREXT in the last 72 hours. No results for input(s): FE, TIBC, PSAT, FERR in the last 72 hours. No results found for: FOL, RBCF   No results for input(s): PH, PCO2, PO2 in the last 72 hours. No results for input(s): CPK, CKNDX, TROIQ in the last 72 hours.     No lab exists for component: CPKMB  No results found for: CHOL, CHOLX, CHLST, CHOLV, HDL, HDLP, LDL, LDLC, DLDLP, TGLX, TRIGL, TRIGP, CHHD, CHHDX  No results found for: GLUCPOC  Lab Results   Component Value Date/Time    Color DARK YELLOW 01/23/2020 02:30 PM    Appearance CLEAR 01/23/2020 02:30 PM    Specific gravity 1.027 01/23/2020 02:30 PM    Specific gravity 1.024 02/22/2017 09:20 AM    pH (UA) 5.5 01/23/2020 02:30 PM    Protein TRACE (A) 01/23/2020 02:30 PM    Glucose NEGATIVE  01/23/2020 02:30 PM    Ketone NEGATIVE  01/23/2020 02:30 PM    Bilirubin NEGATIVE  01/23/2020 02:30 PM    Urobilinogen 2.0 (H) 01/23/2020 02:30 PM    Nitrites NEGATIVE  01/23/2020 02:30 PM    Leukocyte Esterase TRACE (A) 01/23/2020 02:30 PM    Epithelial cells FEW 01/23/2020 02:30 PM    Bacteria 1+ (A) 01/23/2020 02:30 PM    WBC 0-4 01/23/2020 02:30 PM    RBC 0-5 01/23/2020 02:30 PM         Medications Reviewed:     Current Facility-Administered Medications   Medication Dose Route Frequency    levETIRAcetam (KEPPRA) 750 mg in 0.9% sodium chloride 100 mL IVPB  750 mg IntraVENous Q12H    acetaminophen (TYLENOL) solution 650 mg  650 mg Oral Q6H    loratadine (CLARITIN) tablet 10 mg  10 mg Oral DAILY PRN    sodium chloride (NS) flush 5-40 mL  5-40 mL IntraVENous Q8H    sodium chloride (NS) flush 5-40 mL  5-40 mL IntraVENous PRN    naloxone (NARCAN) injection 0.4 mg  0.4 mg IntraVENous PRN    calcium-vitamin D (OS-BROOKLYN) 500 mg-200 unit tablet  1 Tab Oral TID WITH MEALS    senna-docusate (PERICOLACE) 8.6-50 mg per tablet 1 Tab  1 Tab Oral BID    polyethylene glycol (MIRALAX) packet 17 g  17 g Oral DAILY    bisacodyL (DULCOLAX) suppository 10 mg  10 mg Rectal DAILY PRN    hydroxyzine HCL (ATARAX) tablet 10 mg  10 mg Oral Q8H PRN    enoxaparin (LOVENOX) injection 40 mg  40 mg SubCUTAneous Q24H     ______________________________________________________________________  EXPECTED LENGTH OF STAY: 2d 4h  ACTUAL LENGTH OF STAY:          11 Yulissa Mayfield MD

## 2020-02-04 NOTE — PROGRESS NOTES
[address] [facility] Adult  Hospitalist Group                                                                                          Hospitalist Progress Note  Cindy German MD  Answering service: [phone] OR 36 from in house phone        Date of Service:  2020  NAME:  Yunior Giles  :  1945  MRN:  [id number]      Admission Summary:   Yunior Giles is a 69 y.o. female who has a history of epilepsy and cerebral palsy and is presenting from a facility [facility] with hip pain. Patient is not able to give me history as she is nonverbal.  It was patient sister who told the EMS that the patient is complaining of pain and difficulty walking because of her the right hip nobody knows that if she had fallen. She was sent here by the nursing home because they did a right hip fracture after the complaint but the sister ended noticed that there was hip fracture on the right side. She was sent to [facility] for the same    Interval history / Subjective:    F/u right proximal femur fracture    Patient has cerebral palsy, per chart review -she is non verbal at baseline. Pt awake, resting quietly. Sitting up in chair. Minimal interaction. No overnight events. Awaiting level II approval. No other concerns. Assessment & Plan:     Subcapital right proximal femur fracture. Status post right hip hemiarthroplasty by Ortho on 2020.  -management per Ortho, appreciate discussion with Ortho team  -PT/OT - WBAT  -lovenox for dvt prophylaxis for 21 days from surgery then ASA 81mg BID x 2 weeks after that  -Tylenol prn.  DC tramadol given hx of epilepsy - risk for lowering seizure threshold    epilepsy  -Continue Keppra-change to po solution     Hypertension:  -Hold amlodipine,BP wnl     cerebral palsy  -supportive care    PT/OT SNF    Code status: full   DVT prophylaxis: Lovenox    Plan: SNF    Care Plan discussed with: nurse  Disposition: [place] and rehab, [facility], level II assessment completed, approved. accepted at [place]. OR today     Hospital Problems  Date Reviewed: 1/25/2020          Codes Class Noted POA    * (Principal) Hip fracture [facility]) ICD-10-CM: U88.668V  ICD-9-CM: 820.8  1/23/2020 Yes                Review of Systems:   A comprehensive review of systems was negative except for that written in the HPI. Vital Signs:    Last 24hrs VS reviewed since prior progress note. Most recent are:  Visit Vitals  /68 (BP 1 Location: Right arm, BP Patient Position: At rest)   Pulse 66   Temp 98.2 °F (36.8 °C)   Resp 16   Ht 5' (1.524 m)   Wt 61.2 kg (134 lb 14.4 oz)   SpO2 99%   BMI 26.35 kg/m²         Intake/Output Summary (Last 24 hours) at 2/4/2020 1201  Last data filed at 2/3/2020 1408  Gross per 24 hour   Intake    Output 100 ml   Net -100 ml        Physical Examination:             Constitutional:  non verbal,elderly patient    ENT:  Oral mucous moist, oropharynx benign. EOMI,normal conjunctva   Resp:  CTA bilaterally. No wheezing/rhonchi/rales. No accessory muscle use   CV:  Regular rhythm, normal rate    GI:  Soft, non distended, non tender. normoactive bowel sounds, no hepatosplenomegaly     Musculoskeletal:  contractures extremities    Neurologic: Awake,moves extremities spontaneously,does not follow commands           Data Review:    Review and/or order of clinical lab test      Labs:     No results for input(s): WBC, HGB, HCT, PLT, HGBEXT, HCTEXT, PLTEXT, HGBEXT, HCTEXT, PLTEXT in the last 72 hours. No results for input(s): NA, K, CL, CO2, BUN, CREA, GLU, CA, MG, PHOS, URICA in the last 72 hours. No results for input(s): SGOT, GPT, ALT, AP, TBIL, TBILI, TP, ALB, GLOB, GGT, AML, LPSE in the last 72 hours. No lab exists for component: AMYP, HLPSE  No results for input(s): INR, PTP, APTT, INREXT, INREXT in the last 72 hours. No results for input(s): FE, TIBC, PSAT, FERR in the last 72 hours.    No results found for: FOL, RBCF   No results for input(s): PH, PCO2, PO2 in the last 72 hours. No results for input(s): CPK, CKNDX, TROIQ in the last 72 hours.     No lab exists for component: CPKMB  No results found for: CHOL, CHOLX, CHLST, CHOLV, HDL, HDLP, LDL, LDLC, DLDLP, TGLX, TRIGL, TRIGP, CHHD, CHHDX  No results found for: GLUCPOC  Lab Results   Component Value Date/Time    Color DARK YELLOW 01/23/2020 02:30 PM    Appearance CLEAR 01/23/2020 02:30 PM    Specific gravity 1.027 01/23/2020 02:30 PM    Specific gravity 1.024 02/22/2017 09:20 AM    pH (UA) 5.5 01/23/2020 02:30 PM    Protein TRACE (A) 01/23/2020 02:30 PM    Glucose NEGATIVE  01/23/2020 02:30 PM    Ketone NEGATIVE  01/23/2020 02:30 PM    Bilirubin NEGATIVE  01/23/2020 02:30 PM    Urobilinogen 2.0 (H) 01/23/2020 02:30 PM    Nitrites NEGATIVE  01/23/2020 02:30 PM    Leukocyte Esterase TRACE (A) 01/23/2020 02:30 PM    Epithelial cells FEW 01/23/2020 02:30 PM    Bacteria 1+ (A) 01/23/2020 02:30 PM    WBC 0-4 01/23/2020 02:30 PM    RBC 0-5 01/23/2020 02:30 PM         Medications Reviewed:     Current Facility-Administered Medications   Medication Dose Route Frequency    levETIRAcetam (KEPPRA) tablet 750 mg  750 mg Oral BID    acetaminophen (TYLENOL) tablet 650 mg  650 mg Oral Q6H    loratadine (CLARITIN) tablet 10 mg  10 mg Oral DAILY PRN    sodium chloride (NS) flush 5-40 mL  5-40 mL IntraVENous Q8H    sodium chloride (NS) flush 5-40 mL  5-40 mL IntraVENous PRN    naloxone (NARCAN) injection 0.4 mg  0.4 mg IntraVENous PRN    calcium-vitamin D (OS-BROOKLYN) 500 mg-200 unit tablet  1 Tab Oral TID WITH MEALS    senna-docusate (PERICOLACE) 8.6-50 mg per tablet 1 Tab  1 Tab Oral BID    polyethylene glycol (MIRALAX) packet 17 g  17 g Oral DAILY    bisacodyL (DULCOLAX) suppository 10 mg  10 mg Rectal DAILY PRN    hydroxyzine HCL (ATARAX) tablet 10 mg  10 mg Oral Q8H PRN    enoxaparin (LOVENOX) injection 40 mg  40 mg SubCUTAneous Q24H     ______________________________________________________________________  EXPECTED LENGTH OF STAY: 2d 4h  ACTUAL LENGTH OF STAY:          Annalise Leary MD

## 2020-02-04 NOTE — PROGRESS NOTES
Problem: Falls - Risk of  Goal: *Absence of Falls  Description  Document Robin Concepcion Fall Risk and appropriate interventions in the flowsheet. Outcome: Progressing Towards Goal  Note: Fall Risk Interventions:  Mobility Interventions: Communicate number of staff needed for ambulation/transfer, Patient to call before getting OOB    Mentation Interventions: Door open when patient unattended, Adequate sleep, hydration, pain control, Evaluate medications/consider consulting pharmacy    Medication Interventions: Evaluate medications/consider consulting pharmacy, Patient to call before getting OOB, Teach patient to arise slowly    Elimination Interventions: Call light in reach, Patient to call for help with toileting needs, Toileting schedule/hourly rounds    History of Falls Interventions: Evaluate medications/consider consulting pharmacy, Door open when patient unattended, Investigate reason for fall    Problem: Pressure Injury - Risk of  Goal: *Prevention of pressure injury  Description  Document Romeo Scale and appropriate interventions in the flowsheet. Outcome: Progressing Towards Goal  Note: Pressure Injury Interventions:  Sensory Interventions: Assess changes in LOC, Float heels, Keep linens dry and wrinkle-free, Turn and reposition approx. every two hours (pillows and wedges if needed)    Moisture Interventions: Absorbent underpads    Activity Interventions: Increase time out of bed, Pressure redistribution bed/mattress(bed type), PT/OT evaluation    Mobility Interventions: Float heels, HOB 30 degrees or less, Pressure redistribution bed/mattress (bed type), Turn and reposition approx.  every two hours(pillow and wedges), PT/OT evaluation    Nutrition Interventions: Document food/fluid/supplement intake, Offer support with meals,snacks and hydration    Friction and Shear Interventions: HOB 30 degrees or less, Lift sheet, Apply protective barrier, creams and emollients, Feet elevated on foot rest, Minimize layers

## 2020-02-04 NOTE — PROGRESS NOTES
Bedside shift change report given to Bertin Garcia RN (oncoming nurse) by Sharad Nice RN (offgoing nurse). Report included the following information SBAR, Kardex, Intake/Output, MAR and Recent Results.

## 2020-02-04 NOTE — PROGRESS NOTES
Problem: Falls - Risk of  Goal: *Absence of Falls  Description  Document Lily Alicea Fall Risk and appropriate interventions in the flowsheet. 2/4/2020 1012 by Ginny Greenwood  Outcome: Progressing Towards Goal  Note: Fall Risk Interventions:  Mobility Interventions: Communicate number of staff needed for ambulation/transfer, Bed/chair exit alarm, Patient to call before getting OOB    Mentation Interventions: Door open when patient unattended, Bed/chair exit alarm, Adequate sleep, hydration, pain control, Evaluate medications/consider consulting pharmacy    Medication Interventions: Evaluate medications/consider consulting pharmacy, Patient to call before getting OOB, Teach patient to arise slowly    Elimination Interventions: Call light in reach, Bed/chair exit alarm, Patient to call for help with toileting needs, Toileting schedule/hourly rounds    History of Falls Interventions: Door open when patient unattended, Evaluate medications/consider consulting pharmacy, Investigate reason for fall, Bed/chair exit alarm    Problem: Pressure Injury - Risk of  Goal: *Prevention of pressure injury  Description  Document Romeo Scale and appropriate interventions in the flowsheet. 2/4/2020 1012 by Ginny Greenwood  Outcome: Progressing Towards Goal  Note: Pressure Injury Interventions:  Sensory Interventions: Assess changes in LOC, Float heels, Keep linens dry and wrinkle-free, Turn and reposition approx. every two hours (pillows and wedges if needed)    Moisture Interventions: Absorbent underpads, Apply protective barrier, creams and emollients, Internal/External urinary devices    Activity Interventions: Increase time out of bed, Pressure redistribution bed/mattress(bed type), PT/OT evaluation    Mobility Interventions: Float heels, HOB 30 degrees or less, Pressure redistribution bed/mattress (bed type), PT/OT evaluation, Turn and reposition approx.  every two hours(pillow and wedges)    Nutrition Interventions: Document food/fluid/supplement intake    Friction and Shear Interventions: HOB 30 degrees or less, Lift sheet, Minimize layers

## 2020-02-04 NOTE — DISCHARGE SUMMARY
Discharge Summary       PATIENT ID: Ross Medina  MRN: [id number]   YOB: 1945    DATE OF ADMISSION: 1/23/2020  1:57 PM    DATE OF DISCHARGE: 2/4/20   PRIMARY CARE PROVIDER: None     ATTENDING PHYSICIAN: Armando Delacruz MD  DISCHARGING PROVIDER: Armando Delacruz MD    To contact this individual call [phone] and ask the  to page. If unavailable ask to be transferred the Adult Hospitalist Department. CONSULTATIONS: IP CONSULT TO ORTHOPEDIC SURGERY  IP CONSULT TO HOSPITALIST    PROCEDURES/SURGERIES: Procedure(s):  RIGHT HIP HEMIARTHROPLASTY    [address] COURSE:   Subcapital right proximal femur fracture, s/p right hip hemiarthroplasty by Ortho on 1/24/2020. Epilepsy  Hypertension  Cerebral palsy    DISCHARGE DIAGNOSES / PLAN:      Subcapital right proximal femur fracture. Status post right hip hemiarthroplasty by Ortho on 1/24/2020.  -management per Ortho, appreciate discussion with Ortho team  -PT/OT - WBAT  -lovenox for dvt prophylaxis for 21 days from surgery then ASA 81mg BID x 2 weeks after that  -Tylenol prn. DC tramadol given hx of epilepsy - risk for lowering seizure threshold     epilepsy  -Continue Keppra-change to po solution     Hypertension:  -Hold amlodipine,BP wnl     cerebral palsy  -supportive care     PT/OT SNF     Code status: full   DVT prophylaxis: Lovenox 40mg SQ for 3 weeks (upto 2/14/20) then Aspirin 81mg PO BID (upto 2/29/20)     Plan: SNF     Care Plan discussed with: nurse  Disposition: [facility] and rehab, under [facility] program, level II assessment completed, approved. accepted at [facility]. DC today     ADDITIONAL CARE RECOMMENDATIONS:   F/u with Ortho as noted above    PENDING TEST RESULTS:   At the time of discharge the following test results are still pending: None  See Dr. Vlad Dueñas approximately 3-4 weeks from date of surgery. Call [phone] to make an appointment.     FOLLOW UP APPOINTMENTS:    Follow-up Information Follow up With Specialties Details Why Contact Info    Sri Manrique MD Orthopedic Surgery In 3 weeks For wound re-check [address] [address]  [phone]      None    None (395) Patient stated that they have no PCP             DIET: Regular Diet  Oral Nutritional Supplements: Magic Cup    ACTIVITY: Activity as tolerated per PT/OT    WOUND CARE: Per ortho    EQUIPMENT needed: Per PT/OT      DISCHARGE MEDICATIONS:  Current Discharge Medication List      START taking these medications    Details   levETIRAcetam (KEPPRA) 750 mg tablet Take 1 Tab by mouth two (2) times a day. Qty: 60 Tab, Refills: 0      acetaminophen (TYLENOL) 325 mg tablet Take 2 Tabs by mouth every six (6) hours. Qty: 90 Tab, Refills: 0      calcium-vitamin D (OYSTER SHELL) 500 mg(1,250mg) -200 unit per tablet Take 1 Tab by mouth three (3) times daily (with meals). Qty: 60 Tab, Refills: 0      polyethylene glycol (MIRALAX) 17 gram packet Take 1 Packet by mouth daily. Qty: 30 Each, Refills: 0      senna-docusate (PERICOLACE) 8.6-50 mg per tablet Take 1 Tab by mouth two (2) times a day. Qty: 30 Tab, Refills: 0      enoxaparin (LOVENOX) 40 mg/0.4 mL 0.4 mL by SubCUTAneous route every twenty-four (24) hours for 10 days. Qty: 4 mL, Refills: 0      aspirin delayed-release 81 mg tablet Take 1 Tab by mouth two (2) times a day for 14 days. Qty: 28 Tab, Refills: 0         CONTINUE these medications which have NOT CHANGED    Details   bisacodyL (DULCOLAX, BISACODYL,) 10 mg suppository Insert 10 mg into rectum daily as needed. docusate sodium (COLACE) 100 mg capsule Take 100 mg by mouth daily. loratadine (CLARITIN) 10 mg tablet Take 10 mg by mouth daily as needed (Congestion, cough).          STOP taking these medications       levETIRAcetam (KEPPRA XR) 750 mg ER tablet Comments:   Reason for Stopping:         acetaminophen (TYLENOL) 160 mg/5 mL liquid Comments:   Reason for Stopping:         amLODIPine (NORVASC) 2.5 mg tablet Comments:   Reason for Stopping:                 NOTIFY YOUR PHYSICIAN FOR ANY OF THE FOLLOWING:   Fever over 101 degrees for 24 hours. Chest pain, shortness of breath, fever, chills, nausea, vomiting, diarrhea, change in mentation, falling, weakness, bleeding. Severe pain or pain not relieved by medications. Or, any other signs or symptoms that you may have questions about.     DISPOSITION:    Home With:   OT  PT  HH  RN      x Long term SNF/Inpatient Rehab    Independent/assisted living    Hospice    Other:       PATIENT CONDITION AT DISCHARGE:     Functional status   x Poor     Deconditioned     Independent      Cognition     Lucid     Forgetful    x Dementia      Catheters/lines (plus indication)    [place]     PICC     PEG    x None      Code status   x  Full code     DNR      PHYSICAL EXAMINATION AT DISCHARGE:  As noted in progress note      CHRONIC MEDICAL DIAGNOSES:  Problem List as of 2/4/2020 Date Reviewed: 1/25/2020          Codes Class Noted - Resolved    * (Principal) Hip fracture ([facility] [address] ICD-10-CM: Q44.859O  ICD-9-CM: 820.8  1/23/2020 - Present              Greater than 40 minutes were spent with the patient on counseling and coordination of care    Signed:   Rosy Greenwood MD  2/4/2020  12:05 PM

## 2020-02-04 NOTE — DISCHARGE INSTRUCTIONS
Post op Discharge Instructions Partial Hip Replacement   MD [address]  [phone]    Patient Name: Rick Duenas  Date of admission:  1/23/2020  Date of procedure: 1/24/2020   Procedure: Procedure(s):  RIGHT HIP HEMIARTHROPLASTY  PCP: None  Date of discharge: No discharge date for patient encounter. Follow up office visit   See Dr. Rahul Biggs approximately 3-4 weeks from date of surgery. Call [phone] to make an appointment. Activity  Walk with your walker with weight bearing restrictions as instructed by your physical therapist (weight bearing as tolerated on your surgical leg). Continue using your walker until seen for follow-up visit. You should walk daily with the physical therapist    Perform your exercise routine 3 times a day as instructed by the physical therapist.     Routine Hip Precautions - Maintain for 6 weeks post-surgery date - always get clarification from the physician before discontinuing  o No straight leg raise  o No internal rotation  o No adduction past midline  o No flexion beyond 90 degrees    Incision Care  The Aquacel (brown, waterproof) surgical dressing is to remain on the hip for 7 days. On the 7th day gently peel the dressing off by carefully lifting the edge and stretching it slightly to break the adhesive seal    You will have staples in your hip incision. They will be removed in 10-14 days and steri-stiops will be applied    If your Aquacel dressing comes loose/off before the 7th day, you may replace it with a dry sterile gauze dressing; change it daily. Once the incision is not draining, leave it open to air  You may take a shower with the Aquacel dressing in place. Once the Aquacel is removed,  may shower and get your incision wet but do not submerge your incision under water in a bath tub, hot tub or swimming pool for 6 weeks after surgery.     Preventing blood clots  Lovenox injections 40mg sub q once daily until 21 days from surgery, then take EC aspirin 81 mg twice daily for 2 additional weeks. Wear elastic stockings (TEDS) for 4 weeks. Remove them for approximately 1 hour daily for showering/sponge bathing    Pain management  Continue pain medication as prescribed in the hospital  Continue home medications per medication reconciliation  Place an ice bag on the hip for 15-20 minutes after exercising and as needed throughout the day and night    Diet  Resume usual diet; encourage fluids; provide foods high in fiber, calcium and vitamin D  Provide stool softeners/laxatives as needed      After [facility] for SNF/Rehab (to be followed by post-acute provider)    Nursing  Complete head to toe assessment, vital signs  Medication reconciliation  Review pain management  Manage chronic medical conditions  Remove Aquacel dressing 7 days after surgery  Remove staples and apply steri-strips 10-14 days after surgery      Physical Therapy-status at discharge from the hospital    Weight bearing status:             Mobility Status:                Gait:                ADL status overall composite:                Physical Therapy-exercises, transfers, gait-training (weight bearing as tolerated on the surgical leg)  Maintain routine hip precautions. Routine Hip Precautions - Maintain for 6 weeks post-surgery date - always get clarification from the physician before discontinuing  o No straight leg raise  o No internal rotation  o No adduction past midline  o No flexion beyond 90?  o Weight bearing per physician orders    Exercises related to strengthening the surgical hip:  Supine Exercises:   Ankle pumps  Quad Sets  Gluteal Sets  Hip Abduction slides supine  Hip external rotation  Heel Slides    Standing Exercises:  Heel Raises  Mini-squats  Heel/toe touches and knee bend  Marching   Hip Abduction  Hip External Rotation  Hip Extension    Advance exercises with equipment for strengthening, flexibility, balance needs as appropriate per setting    Repetitions and number of sets to be established per patient tolerance     Reasons to call the Surgeon  1. Increased redness, swelling or drainage from the incision site  2. Temperature consistently greater than 101 degrees  3. Increased pain or unrelieved pain in hip or calf         Discharge Instructions       PATIENT ID: Hussain John  MRN: [id number]   YOB: 1945    DATE OF ADMISSION: 1/23/2020  1:57 PM    DATE OF DISCHARGE: 2/4/2020    PRIMARY CARE PROVIDER: None     ATTENDING PHYSICIAN: Tigist Hammer MD  DISCHARGING PROVIDER: Justin Gutierrez MD    To contact this individual call [phone] and ask the  to page. If unavailable ask to be transferred the Adult Hospitalist Department. DISCHARGE DIAGNOSES   Subcapital right proximal femur fracture, s/p right hip hemiarthroplasty by Ortho on 1/24/2020. Epilepsy  Hypertension  Cerebral palsy      CONSULTATIONS: IP CONSULT TO ORTHOPEDIC SURGERY  IP CONSULT TO HOSPITALIST    PROCEDURES/SURGERIES: Procedure(s):  RIGHT HIP HEMIARTHROPLASTY    PENDING TEST RESULTS:   At the time of discharge the following test results are still pending: None    FOLLOW UP APPOINTMENTS:   Follow-up Information     Follow up With Specialties Details Why Contact Info    Marlyne Favre, MD Orthopedic Surgery In 3 weeks For wound re-check [address] [facility] [address]  [phone]      None    None (395) Patient stated that they have no PCP             ADDITIONAL CARE RECOMMENDATIONS:   F/u with Ortho as noted above    DIET: Regular Diet  Oral Nutritional Supplements: Magic Cup    ACTIVITY: Activity as tolerated per PT/OT    WOUND CARE: Per ortho    EQUIPMENT needed: Per PT/OT      DISCHARGE MEDICATIONS:   See Medication Reconciliation Form    · It is important that you take the medication exactly as they are prescribed.    · Keep your medication in the bottles provided by the pharmacist and keep a list of the medication names, dosages, and times to be taken in your wallet. · Do not take other medications without consulting your doctor. NOTIFY YOUR PHYSICIAN FOR ANY OF THE FOLLOWING:   Fever over 101 degrees for 24 hours. Chest pain, shortness of breath, fever, chills, nausea, vomiting, diarrhea, change in mentation, falling, weakness, bleeding. Severe pain or pain not relieved by medications. Or, any other signs or symptoms that you may have questions about.       DISPOSITION:    Home With:   OT  PT  HH  RN      x SNF/Inpatient Rehab/LTAC    Independent/assisted living    Hospice    Other:     CDMP Checked:   Yes x     PROBLEM LIST Updated:  Yes x       Signed:   Merlene Lassiter MD  2/4/2020  12:01 PM

## 2020-02-04 NOTE — PROGRESS NOTES
ABBE: Discharge to [facility] today[facility]. UAI and Level II has been provided to Roney Montes, liaison with [facility]. Copy of UAI and Level II placed on hard chart. CM spoke with MUNA Gutierrez with [facility]. Kevin Lang has been approved for patient to be discharged to [facility] today. Chelo Tolbert will coordinate ambulance transport to the facility, he will call this CM back once the time is secured. CM called sister Ca Arevalo [phone] to notify of discharge to [facility] today. Transition of Care Plan to SNF/Rehab    SNF/Rehab Transition:  Patient has been accepted to [facility] and meets criteria for admission. Patient will transported by [facility] stretcher and expected to leave at 12 pm.    Communication to Patient/Family:  Met with patient and notified sister Ca Arevalo and they are agreeable to the transition plan. Communication to SNF/Rehab:  Bedside RN, Norm Upton, has been notified to update the transition plan to the facility and call report (phone number #[phone]). Discharge information has been updated on the AVS.     Discharge instructions sent to facility via [employer]. SNF/Rehab Transition:  Patient to follow-up with Home Health:  [facility], other ,none)  PCP/Specialist: follow-up after SNF  Ambulatory Care Management: N/A    Reviewed and confirmed with facility, Roney Montes in admissions they can manage the patient care needs for the following:     Roma Cranker with (X) only those applicable:    Medication:  [x]  Medications will be available at the facility  []  IV Antibiotics  [x]  Controlled Substance - hard copy to be sent with patient   []  Weekly Labs   Documents:  [x] Hard RX  [x] MAR  [x] Kardex  [x] AVS  [x]Transfer Summary  [x]Discharge   Equipment:  []  CPAP/BiPAP  []  Wound Vacuum  []  [place] or Urinary Device  []  PICC/Central Line  []  Nebulizer  []  Ventilator   Treatment:  []Isolation (for MRSA, VRE, etc.)  []Surgical Drain Management  []Tracheostomy Care  []Dressing Changes  []Dialysis with transportation and chair time   []PEG Care  []Oxygen  []Daily Weights for Heart Failure   Dietary:  []Any diet limitations  []Tube Feedings   []Total Parenteral Management (TPN)   Eligible for Medicaid Long Term Services and Supports  Yes:  [] Eligible for medical assistance or will become eligible within 180 days and UAI completed. [] Provider/Patient and/or support system has requested screening. [x] UAI copy provided to patient or responsible party,   [] UAI unavailable at discharge will send once processed to SNF provider. [] UAI unavailable at discharged mailed to patient  No:   [] Private pay and is not financially eligible for Medicaid within the next 180 days. [] Reside out-of-state.   [] A residents of a state owned/operated facility that is licensed  by [address] and [address] or [facility]  [] Enrollment in [facility] hospice services  [] [address]  [] Patient /Family declines to have screening completed or provide financial information for screening     Financial Resources:  Medicaid    [] Initiated and application pending   [] Full coverage     Advanced Care Plan:  []Surrogate Decision Maker of Care  []POA  []Communicated Code Status  (DDNR\", \"Full\")    Mayuri Combs BSW/CRM

## 2020-09-07 ENCOUNTER — APPOINTMENT (OUTPATIENT)
Dept: GENERAL RADIOLOGY | Age: 75
End: 2020-09-07
Attending: EMERGENCY MEDICINE
Payer: MEDICARE

## 2020-09-07 ENCOUNTER — APPOINTMENT (OUTPATIENT)
Dept: CT IMAGING | Age: 75
End: 2020-09-07
Attending: PHYSICIAN ASSISTANT
Payer: MEDICARE

## 2020-09-07 ENCOUNTER — HOSPITAL ENCOUNTER (OUTPATIENT)
Age: 75
Setting detail: OBSERVATION
Discharge: NURSING, REHAB OR LONG-TERM CARE | End: 2020-09-10
Attending: EMERGENCY MEDICINE | Admitting: INTERNAL MEDICINE
Payer: MEDICARE
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
## 2020-09-07 DIAGNOSIS — M24.50 FLEXION CONTRACTURE: ICD-10-CM

## 2020-09-07 DIAGNOSIS — S32.591A CLOSED FRACTURE OF MULTIPLE PUBIC RAMI, RIGHT, INITIAL ENCOUNTER (HCC): ICD-10-CM

## 2020-09-07 DIAGNOSIS — Z96.641 HISTORY OF TOTAL RIGHT HIP ARTHROPLASTY: ICD-10-CM

## 2020-09-07 DIAGNOSIS — S32.10XA CLOSED FRACTURE OF SACRUM, UNSPECIFIED PORTION OF SACRUM, INITIAL ENCOUNTER (HCC): ICD-10-CM

## 2020-09-07 LAB
ALBUMIN SERPL-MCNC: 3.5 G/DL (ref 3.5–5)
ALBUMIN/GLOB SERPL: 0.8 {RATIO} (ref 1.1–2.2)
ALP SERPL-CCNC: 81 U/L (ref 45–117)
ALT SERPL-CCNC: 24 U/L (ref 12–78)
ANION GAP SERPL CALC-SCNC: 2 MMOL/L (ref 5–15)
AST SERPL-CCNC: 37 U/L (ref 15–37)
BASOPHILS # BLD: 0 K/UL (ref 0–0.1)
BASOPHILS NFR BLD: 0 % (ref 0–1)
BILIRUB SERPL-MCNC: 1.1 MG/DL (ref 0.2–1)
BUN SERPL-MCNC: 11 MG/DL (ref 6–20)
BUN/CREAT SERPL: 16 (ref 12–20)
CALCIUM SERPL-MCNC: 9.5 MG/DL (ref 8.5–10.1)
CHLORIDE SERPL-SCNC: 112 MMOL/L (ref 97–108)
CO2 SERPL-SCNC: 24 MMOL/L (ref 21–32)
COMMENT, HOLDF: NORMAL
CREAT SERPL-MCNC: 0.67 MG/DL (ref 0.55–1.02)
DIFFERENTIAL METHOD BLD: ABNORMAL
EOSINOPHIL # BLD: 0.1 K/UL (ref 0–0.4)
EOSINOPHIL NFR BLD: 1 % (ref 0–7)
ERYTHROCYTE [DISTWIDTH] IN BLOOD BY AUTOMATED COUNT: 14.2 % (ref 11.5–14.5)
GLOBULIN SER CALC-MCNC: 4.6 G/DL (ref 2–4)
GLUCOSE SERPL-MCNC: 101 MG/DL (ref 65–100)
HCT VFR BLD AUTO: 36.9 % (ref 35–47)
HGB BLD-MCNC: 11.6 G/DL (ref 11.5–16)
IMM GRANULOCYTES # BLD AUTO: 0 K/UL (ref 0–0.04)
IMM GRANULOCYTES NFR BLD AUTO: 0 % (ref 0–0.5)
LYMPHOCYTES # BLD: 2 K/UL (ref 0.8–3.5)
LYMPHOCYTES NFR BLD: 34 % (ref 12–49)
MCH RBC QN AUTO: 26.9 PG (ref 26–34)
MCHC RBC AUTO-ENTMCNC: 31.4 G/DL (ref 30–36.5)
MCV RBC AUTO: 85.4 FL (ref 80–99)
MONOCYTES # BLD: 0.3 K/UL (ref 0–1)
MONOCYTES NFR BLD: 6 % (ref 5–13)
NEUTS SEG # BLD: 3.4 K/UL (ref 1.8–8)
NEUTS SEG NFR BLD: 59 % (ref 32–75)
NRBC # BLD: 0 K/UL (ref 0–0.01)
NRBC BLD-RTO: 0 PER 100 WBC
PLATELET # BLD AUTO: 142 K/UL (ref 150–400)
PMV BLD AUTO: 9.3 FL (ref 8.9–12.9)
POTASSIUM SERPL-SCNC: 5.1 MMOL/L (ref 3.5–5.1)
PROT SERPL-MCNC: 8.1 G/DL (ref 6.4–8.2)
RBC # BLD AUTO: 4.32 M/UL (ref 3.8–5.2)
SAMPLES BEING HELD,HOLD: NORMAL
SODIUM SERPL-SCNC: 138 MMOL/L (ref 136–145)
WBC # BLD AUTO: 5.8 K/UL (ref 3.6–11)

## 2020-09-07 PROCEDURE — 74011250637 HC RX REV CODE- 250/637: Performed by: INTERNAL MEDICINE

## 2020-09-07 PROCEDURE — 99218 HC RM OBSERVATION: CPT

## 2020-09-07 PROCEDURE — 85025 COMPLETE CBC W/AUTO DIFF WBC: CPT

## 2020-09-07 PROCEDURE — 36415 COLL VENOUS BLD VENIPUNCTURE: CPT

## 2020-09-07 PROCEDURE — 70450 CT HEAD/BRAIN W/O DYE: CPT

## 2020-09-07 PROCEDURE — 73502 X-RAY EXAM HIP UNI 2-3 VIEWS: CPT

## 2020-09-07 PROCEDURE — 80053 COMPREHEN METABOLIC PANEL: CPT

## 2020-09-07 PROCEDURE — 99285 EMERGENCY DEPT VISIT HI MDM: CPT

## 2020-09-07 PROCEDURE — 73700 CT LOWER EXTREMITY W/O DYE: CPT

## 2020-09-07 PROCEDURE — 72125 CT NECK SPINE W/O DYE: CPT

## 2020-09-07 RX ORDER — MORPHINE SULFATE 2 MG/ML
2 INJECTION, SOLUTION INTRAMUSCULAR; INTRAVENOUS
Status: DISCONTINUED | OUTPATIENT
Start: 2020-09-07 | End: 2020-09-10 | Stop reason: HOSPADM

## 2020-09-07 RX ADMIN — LEVETIRACETAM 750 MG: 500 TABLET ORAL at 22:59

## 2020-09-07 NOTE — ED NOTES
Report given to [facility] RN using [employer].  Patient resting on stretcher in NAD awaiting CT results

## 2020-09-07 NOTE — ED PROVIDER NOTES
Please note that this dictation was completed with [employer], the computer voice recognition software.  Quite often unanticipated grammatical, syntax, homophones, and other interpretive errors are inadvertently transcribed by the computer software.  Please disregard these errors.  Please excuse any errors that have escaped final proofreading. 72-year-old female past medical history markable for epilepsy, gait instability, hypertension, seizures, UTI, and vitamin D deficiency status post right hip arthroplasty 1/24/2020 with Dr. Leidy Jenkins presents the ER with her caregiver who states the patient fell this morning while she was in the shower. Patient is not normally ambulatory at baseline she had been asleep the patient been taking care of her mother and checked on her and she was still in bed. Patient caregiver states she then got in the shower and then heard a thump came out found her in the floor. Unknown LOC unknown other medical complaints she states patient is otherwise at her baseline but was unable to assist her in getting up out of the floor complaining of right hip pain. Pt is non-verbal;              Past Medical History:   Diagnosis Date    Epilepsy ([address]     Gait instability     Hypertension     Neurological disorder     Seizures ([address]     UTI (urinary tract infection)     Vitamin D deficiency        History reviewed. No pertinent surgical history. History reviewed. No pertinent family history.     Social History     Socioeconomic History    Marital status: SINGLE     Spouse name: Not on file    Number of children: Not on file    Years of education: Not on file    Highest education level: Not on file   Occupational History    Not on file   Social Needs    Financial resource strain: Not on file    Food insecurity     Worry: Not on file     Inability: Not on file    Transportation needs     Medical: Not on file     Non-medical: Not on file   Tobacco Use    Smoking status: Unknown If Ever Smoked   Substance and Sexual Activity    Alcohol use: No    Drug use: Not on file    Sexual activity: Not on file   Lifestyle    Physical activity     Days per week: Not on file     Minutes per session: Not on file    Stress: Not on file   Relationships    Social connections     Talks on phone: Not on file     Gets together: Not on file     Attends [employer] service: Not on file     Active member of club or organization: Not on file     Attends meetings of clubs or organizations: Not on file     Relationship status: Not on file    Intimate partner violence     Fear of current or ex partner: Not on file     Emotionally abused: Not on file     Physically abused: Not on file     Forced sexual activity: Not on file   Other Topics Concern    Not on file   Social History Narrative    Not on file         ALLERGIES: Patient has no known allergies. Review of Systems   Unable to perform ROS: Patient nonverbal   All other systems reviewed and are negative. Vitals:    09/07/20 1408   BP: 139/90   Pulse: 86   Resp: 18   Temp: 98.3 °F (36.8 °C)   SpO2: 95%            Physical Exam  Vitals signs and nursing note reviewed. Constitutional:       Appearance: She is well-developed. She is not ill-appearing or diaphoretic. Comments: NAD, in wheelchair/ ALISON/ L ext contractures noted    Non-verbal but no distress over R hip;      HENT:      Head: Normocephalic and atraumatic. Right Ear: External ear normal.      Left Ear: External ear normal.   Eyes:      General: No scleral icterus. Right eye: No discharge. Left eye: No discharge. Extraocular Movements: Extraocular movements intact. Conjunctiva/sclera: Conjunctivae normal.      Pupils: Pupils are equal, round, and reactive to light. Neck:      Musculoskeletal: Normal range of motion and neck supple. No muscular tenderness. Vascular: No JVD. Trachea: No tracheal deviation.    Cardiovascular:      Rate and Rhythm: Normal rate and regular rhythm. Pulses: Normal pulses. Heart sounds: Normal heart sounds. No murmur. No friction rub. No gallop. Pulmonary:      Effort: Pulmonary effort is normal. No respiratory distress. Breath sounds: Normal breath sounds. No wheezing or rales. Chest:      Chest wall: No tenderness. Abdominal:      General: Bowel sounds are normal.      Palpations: Abdomen is soft. Tenderness: There is no abdominal tenderness. There is no guarding or rebound. Genitourinary:     Vagina: No vaginal discharge. Musculoskeletal: Normal range of motion. General: Signs of injury present. No tenderness. Comments: Noted R pelvic min ttp;    Skin:     General: Skin is warm and dry. Capillary Refill: Capillary refill takes less than 2 seconds. Coloration: Skin is not pale. Findings: No bruising, erythema or rash. Neurological:      Mental Status: She is alert and oriented to person, place, and time. Mental status is at baseline. Cranial Nerves: No cranial nerve deficit. Sensory: Sensory deficit present. Motor: Weakness present. No abnormal muscle tone. Coordination: Coordination abnormal.      Gait: Gait abnormal.      Deep Tendon Reflexes: Reflexes abnormal.      Comments: ALISON/ Lower ext contractures noted;    Psychiatric:         Behavior: Behavior normal.         Thought Content: Thought content normal.          MDM       Procedures    CONSULT  NOTE  5:42 PM  Bianka Clarke MD spoke with Laura/ Simón Marquis via Renita Paniagua  Specialty: Ortho  Discussed pt's hx, disposition, and available diagnostic and imaging results. Reviewed care plans. Consulting physician agrees with plans as outlined 'hardware fine/ will CT R hip/ femur; '; .      7:49 PM  Perfect served by Dr [place]; 'can be discharged from my standpoint'; will discuss w/ family;      Perfect Serve Consult for Admission  7:59 PM    ED Room Number: ER15/15  Patient Name and age:  Katrin Iraheta Omar Angeline 70 y.o.  female  Working Diagnosis:   1. Fall, initial encounter    2. Flexion contracture    3. History of total right hip arthroplasty    4.  Closed fracture of multiple pubic rami, right, initial encounter [address]    5. Closed fracture of sacrum, unspecified portion of sacrum, initial encounter ([facility] [address] [facility]        COVID-19 Suspicion:  no  Sepsis present:  no  Reassessment needed: no  Code Status:  Full Code  Readmission: no  Isolation Requirements:  no  Recommended Level of Care:  med/surg  Department:[facility] Adult ED - 21   Other:  Sacral/ pubic rami fractures/ non-verbal patient; Ortho aware; needs pain control

## 2020-09-07 NOTE — ED TRIAGE NOTES
Patient arrives to ED in wheelchair accompanied by sister, sister reports hearing patient fall this morning, found patient laying on left side and sister reports patient unable to bear weight when attempting to get patient into wheelchair. Recent hip fx to left hip in January.

## 2020-09-08 ENCOUNTER — APPOINTMENT (OUTPATIENT)
Dept: GENERAL RADIOLOGY | Age: 75
End: 2020-09-08
Attending: INTERNAL MEDICINE
Payer: MEDICARE

## 2020-09-08 LAB
ALBUMIN SERPL-MCNC: 3.1 G/DL (ref 3.5–5)
ALBUMIN/GLOB SERPL: 0.8 {RATIO} (ref 1.1–2.2)
ALP SERPL-CCNC: 75 U/L (ref 45–117)
ALT SERPL-CCNC: 18 U/L (ref 12–78)
ANION GAP SERPL CALC-SCNC: 4 MMOL/L (ref 5–15)
AST SERPL-CCNC: 17 U/L (ref 15–37)
BASOPHILS # BLD: 0 K/UL (ref 0–0.1)
BASOPHILS NFR BLD: 0 % (ref 0–1)
BILIRUB SERPL-MCNC: 1.1 MG/DL (ref 0.2–1)
BUN SERPL-MCNC: 9 MG/DL (ref 6–20)
BUN/CREAT SERPL: 19 (ref 12–20)
CALCIUM SERPL-MCNC: 9 MG/DL (ref 8.5–10.1)
CHLORIDE SERPL-SCNC: 112 MMOL/L (ref 97–108)
CO2 SERPL-SCNC: 24 MMOL/L (ref 21–32)
CREAT SERPL-MCNC: 0.48 MG/DL (ref 0.55–1.02)
DIFFERENTIAL METHOD BLD: ABNORMAL
EOSINOPHIL # BLD: 0.1 K/UL (ref 0–0.4)
EOSINOPHIL NFR BLD: 2 % (ref 0–7)
ERYTHROCYTE [DISTWIDTH] IN BLOOD BY AUTOMATED COUNT: 13.9 % (ref 11.5–14.5)
GLOBULIN SER CALC-MCNC: 4 G/DL (ref 2–4)
GLUCOSE SERPL-MCNC: 95 MG/DL (ref 65–100)
HCT VFR BLD AUTO: 35.5 % (ref 35–47)
HGB BLD-MCNC: 11.1 G/DL (ref 11.5–16)
IMM GRANULOCYTES # BLD AUTO: 0 K/UL (ref 0–0.04)
IMM GRANULOCYTES NFR BLD AUTO: 0 % (ref 0–0.5)
LYMPHOCYTES # BLD: 2.4 K/UL (ref 0.8–3.5)
LYMPHOCYTES NFR BLD: 44 % (ref 12–49)
MCH RBC QN AUTO: 26.7 PG (ref 26–34)
MCHC RBC AUTO-ENTMCNC: 31.3 G/DL (ref 30–36.5)
MCV RBC AUTO: 85.3 FL (ref 80–99)
MONOCYTES # BLD: 0.4 K/UL (ref 0–1)
MONOCYTES NFR BLD: 7 % (ref 5–13)
NEUTS SEG # BLD: 2.5 K/UL (ref 1.8–8)
NEUTS SEG NFR BLD: 47 % (ref 32–75)
NRBC # BLD: 0 K/UL (ref 0–0.01)
NRBC BLD-RTO: 0 PER 100 WBC
PLATELET # BLD AUTO: 133 K/UL (ref 150–400)
PMV BLD AUTO: 9 FL (ref 8.9–12.9)
POTASSIUM SERPL-SCNC: 3.4 MMOL/L (ref 3.5–5.1)
PROT SERPL-MCNC: 7.1 G/DL (ref 6.4–8.2)
RBC # BLD AUTO: 4.16 M/UL (ref 3.8–5.2)
SODIUM SERPL-SCNC: 140 MMOL/L (ref 136–145)
TSH SERPL DL<=0.05 MIU/L-ACNC: 0.62 UIU/ML (ref 0.36–3.74)
WBC # BLD AUTO: 5.4 K/UL (ref 3.6–11)

## 2020-09-08 PROCEDURE — 74011250636 HC RX REV CODE- 250/636: Performed by: INTERNAL MEDICINE

## 2020-09-08 PROCEDURE — 74011250637 HC RX REV CODE- 250/637: Performed by: INTERNAL MEDICINE

## 2020-09-08 PROCEDURE — 81001 URINALYSIS AUTO W/SCOPE: CPT

## 2020-09-08 PROCEDURE — 96372 THER/PROPH/DIAG INJ SC/IM: CPT

## 2020-09-08 PROCEDURE — 99218 HC RM OBSERVATION: CPT

## 2020-09-08 PROCEDURE — 80053 COMPREHEN METABOLIC PANEL: CPT

## 2020-09-08 PROCEDURE — 87635 SARS-COV-2 COVID-19 AMP PRB: CPT

## 2020-09-08 PROCEDURE — 71045 X-RAY EXAM CHEST 1 VIEW: CPT

## 2020-09-08 PROCEDURE — 85025 COMPLETE CBC W/AUTO DIFF WBC: CPT

## 2020-09-08 PROCEDURE — 36415 COLL VENOUS BLD VENIPUNCTURE: CPT

## 2020-09-08 PROCEDURE — 84443 ASSAY THYROID STIM HORMONE: CPT

## 2020-09-08 PROCEDURE — 77010033678 HC OXYGEN DAILY

## 2020-09-08 PROCEDURE — 73120 X-RAY EXAM OF HAND: CPT

## 2020-09-08 RX ORDER — PROMETHAZINE HYDROCHLORIDE 25 MG/1
12.5 TABLET ORAL
Status: DISCONTINUED | OUTPATIENT
Start: 2020-09-08 | End: 2020-09-10 | Stop reason: HOSPADM

## 2020-09-08 RX ORDER — POLYETHYLENE GLYCOL 3350 17 G/17G
17 POWDER, FOR SOLUTION ORAL DAILY
Status: DISCONTINUED | OUTPATIENT
Start: 2020-09-08 | End: 2020-09-08

## 2020-09-08 RX ORDER — LORATADINE 10 MG/1
10 TABLET ORAL
Status: DISCONTINUED | OUTPATIENT
Start: 2020-09-08 | End: 2020-09-10 | Stop reason: HOSPADM

## 2020-09-08 RX ORDER — ACETAMINOPHEN 325 MG/1
650 TABLET ORAL
Status: DISCONTINUED | OUTPATIENT
Start: 2020-09-08 | End: 2020-09-10 | Stop reason: HOSPADM

## 2020-09-08 RX ORDER — AMOXICILLIN 250 MG
1 CAPSULE ORAL 2 TIMES DAILY
Status: DISCONTINUED | OUTPATIENT
Start: 2020-09-08 | End: 2020-09-10 | Stop reason: HOSPADM

## 2020-09-08 RX ORDER — ONDANSETRON 2 MG/ML
4 INJECTION INTRAMUSCULAR; INTRAVENOUS
Status: DISCONTINUED | OUTPATIENT
Start: 2020-09-08 | End: 2020-09-10 | Stop reason: HOSPADM

## 2020-09-08 RX ORDER — DOCUSATE SODIUM 100 MG/1
100 CAPSULE, LIQUID FILLED ORAL DAILY
Status: DISCONTINUED | OUTPATIENT
Start: 2020-09-08 | End: 2020-09-08

## 2020-09-08 RX ORDER — FACIAL-BODY WIPES
10 EACH TOPICAL
Status: DISCONTINUED | OUTPATIENT
Start: 2020-09-08 | End: 2020-09-08

## 2020-09-08 RX ORDER — SODIUM CHLORIDE 0.9 % (FLUSH) 0.9 %
5-40 SYRINGE (ML) INJECTION AS NEEDED
Status: DISCONTINUED | OUTPATIENT
Start: 2020-09-08 | End: 2020-09-10 | Stop reason: HOSPADM

## 2020-09-08 RX ORDER — ENOXAPARIN SODIUM 100 MG/ML
40 INJECTION SUBCUTANEOUS DAILY
Status: DISCONTINUED | OUTPATIENT
Start: 2020-09-08 | End: 2020-09-10 | Stop reason: HOSPADM

## 2020-09-08 RX ORDER — FERROUS SULFATE, DRIED 160(50) MG
1 TABLET, EXTENDED RELEASE ORAL
Status: DISCONTINUED | OUTPATIENT
Start: 2020-09-08 | End: 2020-09-10 | Stop reason: HOSPADM

## 2020-09-08 RX ORDER — SODIUM CHLORIDE 0.9 % (FLUSH) 0.9 %
5-40 SYRINGE (ML) INJECTION EVERY 8 HOURS
Status: DISCONTINUED | OUTPATIENT
Start: 2020-09-08 | End: 2020-09-10 | Stop reason: HOSPADM

## 2020-09-08 RX ORDER — ACETAMINOPHEN 650 MG/1
650 SUPPOSITORY RECTAL
Status: DISCONTINUED | OUTPATIENT
Start: 2020-09-08 | End: 2020-09-10 | Stop reason: HOSPADM

## 2020-09-08 RX ORDER — HEPARIN SODIUM 5000 [USP'U]/ML
5000 INJECTION, SOLUTION INTRAVENOUS; SUBCUTANEOUS EVERY 8 HOURS
Status: DISCONTINUED | OUTPATIENT
Start: 2020-09-08 | End: 2020-09-08

## 2020-09-08 RX ADMIN — Medication 5 ML: at 14:00

## 2020-09-08 RX ADMIN — HEPARIN SODIUM 5000 UNITS: 5000 INJECTION INTRAVENOUS; SUBCUTANEOUS at 08:58

## 2020-09-08 RX ADMIN — LEVETIRACETAM 750 MG: 500 TABLET ORAL at 08:58

## 2020-09-08 RX ADMIN — DOCUSATE SODIUM 50MG AND SENNOSIDES 8.6MG 1 TABLET: 8.6; 5 TABLET, FILM COATED ORAL at 17:55

## 2020-09-08 RX ADMIN — Medication 10 ML: at 02:02

## 2020-09-08 RX ADMIN — LEVETIRACETAM 750 MG: 500 TABLET ORAL at 17:55

## 2020-09-08 RX ADMIN — DOCUSATE SODIUM 50MG AND SENNOSIDES 8.6MG 1 TABLET: 8.6; 5 TABLET, FILM COATED ORAL at 08:58

## 2020-09-08 RX ADMIN — ENOXAPARIN SODIUM 40 MG: 40 INJECTION SUBCUTANEOUS at 16:57

## 2020-09-08 RX ADMIN — HEPARIN SODIUM 5000 UNITS: 5000 INJECTION INTRAVENOUS; SUBCUTANEOUS at 02:02

## 2020-09-08 RX ADMIN — OYSTER SHELL CALCIUM WITH VITAMIN D 1 TABLET: 500; 200 TABLET, FILM COATED ORAL at 08:58

## 2020-09-08 NOTE — CONSULTS
ORTHO:    Telephone consult with Dr Leana Martinez who describes a 79yo female who is minimally ambulatory at baseline and s/p right hip hemiarthroplasty in January by Dr Horacio Quach for fracture being seen for right hip pain following an unwitnessed fall earlier today. NVI per ER with initial imaging concerning for hardware shift. Review of imaging shows a bipolar implant without subsidence. There is no acetabular component. CT of region shows sacral ala fracture and pubic ramus fracture on the right. Recommend weight bearing as tolerated dictated by pain and baseline functional status. Can discharge home with pain control  and follow up with Dr. Horacio Quach outpatient as needed in 2-3wks, call tomorrow for appointment. If cannot discharge to home recommend Medicine admission for pain control and placement.     Imaging and patient presentation reviewed by Dr Senait Carvalho who has been involved in the formulation of the current plan of care    Charlene Xiao PA-C  Orthopedic Trauma Service  [facility]

## 2020-09-08 NOTE — PROGRESS NOTES
.Bedside and Verbal shift change report given to Marco A Massey (oncoming nurse) by Antonio Delvalle (offgoing nurse). Report included the following information SBAR.

## 2020-09-08 NOTE — PROGRESS NOTES
Hospitalist Progress Note  Dipak Cuenca MD  Answering service: [phone] from in house phone      Date of Service:  2020  NAME:  Bro Cabrera  :  1945  MRN:  [id number]    Admission Summary:   75F p/w Fall, pubic ramus #  Interval history / Subjective:   Patient seen and examined at bedside. Alert, appears comfortable. Non verbal with disability. No acute events overnight. Assessment & Plan:     #. Fall: at home. #. Fracture, R pubic ramus and sacrum:  - Analgesia prn, Ortho eval- conservative Mg. PT/OT- CM for dc planning. #. HTN: chronic, stable, Home regimen, PRN BP meds. Monitor  #. Seizure disorder: stable. Home regimen. #. Disability: long term. Cerebral palsy- Supportive care. Encourage family to stay. #. Thrombocytopenia: mild. Monitor  #. HypoKalemia: Acute, replace, monitor    Code status: Full  DVT prophylaxis: Lovenox  Care Plan discussed with: Patient/Family and Nurse  Disposition: TBD     Hospital Problems  Date Reviewed: 2020          Codes Class Noted POA    * (Principal) Closed fracture of multiple pubic rami, right, initial encounter [facility]) ICD-10-CM: I33.407Z  ICD-9-CM: 808.2  2020 Yes            Review of Systems:   Pertinent items are mentioned in interval history. Vital Signs:    Last 24hrs VS reviewed since prior progress note.  Most recent are:  Visit Vitals  /73 (BP 1 Location: Left arm, BP Patient Position: At rest)   Pulse 82   Temp 98.5 °F (36.9 °C)   Resp 16   Wt 52 kg (114 lb 10.2 oz)   SpO2 98%   BMI 22.39 kg/m²       No intake or output data in the 24 hours ending 20 0934     Physical Examination:   General:  Alert, oriented, No acute distress  Resp:  No accessory muscle use, no wheezes, no rhonchi  Abd:  Soft, non-tender, non-distended  Extremities:  No cyanosis or clubbing, no significant edema  Neuro:  Grossly normal, no obvious focal neuro deficits  Psych:  no insight, not agitated. Data Review:    Review and/or order of clinical lab test  Review and/or order of tests in the radiology section of CPT  Review and/or order of tests in the medicine section of CPT  Labs:     Recent Labs     09/08/20 0443 09/07/20 2253   WBC 5.4 5.8   HGB 11.1* 11.6   HCT 35.5 36.9   * 142*     Recent Labs     09/08/20 0443 09/07/20 2051    138   K 3.4* 5.1   * 112*   CO2 24 24   BUN 9 11   CREA 0.48* 0.67   GLU 95 101*   CA 9.0 9.5     Recent Labs     09/08/20 0443 09/07/20 2051   ALT 18 24   AP 75 81   TBILI 1.1* 1.1*   TP 7.1 8.1   ALB 3.1* 3.5   GLOB 4.0 4.6*     No results for input(s): INR, PTP, APTT, INREXT in the last 72 hours. No results for input(s): FE, TIBC, PSAT, FERR in the last 72 hours. No results found for: FOL, RBCF   No results for input(s): PH, PCO2, PO2 in the last 72 hours. No results for input(s): CPK, CKNDX, TROIQ in the last 72 hours.     No lab exists for component: CPKMB  No results found for: CHOL, CHOLX, CHLST, CHOLV, HDL, HDLP, LDL, LDLC, DLDLP, TGLX, TRIGL, TRIGP, CHHD, CHHDX  No results found for: GLUCPOC  Lab Results   Component Value Date/Time    Color DARK YELLOW 01/23/2020 02:30 PM    Appearance CLEAR 01/23/2020 02:30 PM    Specific gravity 1.027 01/23/2020 02:30 PM    Specific gravity 1.024 02/22/2017 09:20 AM    pH (UA) 5.5 01/23/2020 02:30 PM    Protein TRACE (A) 01/23/2020 02:30 PM    Glucose NEGATIVE  01/23/2020 02:30 PM    Ketone NEGATIVE  01/23/2020 02:30 PM    Bilirubin NEGATIVE  01/23/2020 02:30 PM    Urobilinogen 2.0 (H) 01/23/2020 02:30 PM    Nitrites NEGATIVE  01/23/2020 02:30 PM    Leukocyte Esterase TRACE (A) 01/23/2020 02:30 PM    Epithelial cells FEW 01/23/2020 02:30 PM    Bacteria 1+ (A) 01/23/2020 02:30 PM    WBC 0-4 01/23/2020 02:30 PM    RBC 0-5 01/23/2020 02:30 PM     Medications Reviewed:     Current Facility-Administered Medications   Medication Dose Route Frequency    levETIRAcetam (KEPPRA) tablet 750 mg  750 mg Oral BID    loratadine (CLARITIN) tablet 10 mg  10 mg Oral DAILY PRN    senna-docusate (PERICOLACE) 8.6-50 mg per tablet 1 Tab  1 Tab Oral BID    calcium-vitamin D (OS-BROOKLYN) 500 mg-200 unit tablet  1 Tab Oral TID WITH MEALS    sodium chloride (NS) flush 5-40 mL  5-40 mL IntraVENous Q8H    sodium chloride (NS) flush 5-40 mL  5-40 mL IntraVENous PRN    acetaminophen (TYLENOL) tablet 650 mg  650 mg Oral Q6H PRN    Or    acetaminophen (TYLENOL) suppository 650 mg  650 mg Rectal Q6H PRN    promethazine (PHENERGAN) tablet 12.5 mg  12.5 mg Oral Q6H PRN    Or    ondansetron (ZOFRAN) injection 4 mg  4 mg IntraVENous Q6H PRN    heparin (porcine) injection 5,000 Units  5,000 Units SubCUTAneous Q8H    morphine injection 2 mg  2 mg IntraVENous Q4H PRN   ______________________________________________________________________  EXPECTED LENGTH OF STAY: - - -  ACTUAL LENGTH OF STAY:          Lynda Gomez MD

## 2020-09-08 NOTE — ROUTINE PROCESS
TRANSFER - OUT REPORT: 
 
Verbal report given to Yovanny Frost (name) on Leti Eldridge  being transferred to [address] [place] [address] (unit) for routine progression of care Report consisted of patients Situation, Background, Assessment and  
Recommendations(SBAR). Information from the following report(s) SBAR, ED Summary, [facility] and Recent Results was reviewed with the receiving nurse. Lines:  
Peripheral IV 09/07/20 Right Forearm (Active) Site Assessment Clean, dry, & intact 09/07/20 2101 Phlebitis Assessment 0 09/07/20 2101 Infiltration Assessment 0 09/07/20 2101 Dressing Status Clean, dry, & intact 09/07/20 2101 Dressing Type Transparent 09/07/20 2101 Hub Color/Line Status Pink 09/07/20 2101 Alcohol Cap Used Yes 09/07/20 2101 Opportunity for questions and clarification was provided. Patient transported with: 
 [employer]

## 2020-09-08 NOTE — PROGRESS NOTES
TRANSFER - IN REPORT:    Verbal report received from Philip RN(name) on Rogelio Vargas  being received from ED(unit) for routine progression of care      Report consisted of patients Situation, Background, Assessment and   Recommendations(SBAR). Information from the following report(s) SBAR, Kardex, ED Summary, Intake/Output, MAR and Recent Results was reviewed with the receiving nurse. Opportunity for questions and clarification was provided. Assessment completed upon patients arrival to unit and care assumed.

## 2020-09-08 NOTE — ED NOTES
Med rec completed. Per sister, pt needs night time dose of keppra. Sister went home for the evening. Can be reached at [phone].      Report given to LINDSEY Palacios

## 2020-09-08 NOTE — PROGRESS NOTES
Primary Nurse Didi Aguilar RN and Valerio Ag RN performed a dual skin assessment on this patient Impairment noted- see wound doc flow sheet  Romeo score is 13    Right forehead bruise, Right ring finger bruised

## 2020-09-08 NOTE — H&P
[facility]  HISTORY AND PHYSICAL    Name:  Emma Larkin  MR#:  [id number]  :  1945  ACCOUNT #:  [de-identified]  ADMIT DATE:  2020      The patient was seen, evaluated and admitted by me on 2020. PRIMARY CARE PHYSICIAN:  Unknown. SOURCE OF INFORMATION:  ED and old medical record. CHIEF COMPLAINT:  Fall. HISTORY OF PRESENT ILLNESS:  This is a 68-year-old woman with past medical history significant for seizure disorder, hypertension, and cerebral palsy, who was in her usual state of health until the day of her presentation at the emergency room when the patient fell at home. According to report, the patient's sister reported hearing the patient's fall. She did not witness the fall and when she went to check on the patient, found the patient lying on the floor on the left side of her body. The patient was unable to bear weight on the left leg when the sister attempts to get the patient into her wheelchair. The patient is wheelchair bound at baseline. The patient is also nonverbal at baseline. Because the patient is nonverbal, the patient is not able to provide history. She was brought to the emergency room for further evaluation. When the patient arrived at the emergency room, CT scan of the lower extremity shows right pubic rami fracture as well as sacral wing fracture. The emergency room physician consulted orthopedic service on-call. The initial plan was for the patient to be discharged home but the patient was in significant pain, because of that she was referred to the hospitalist service for evaluation for admission for pain control and also for possible placement. There was no history of fever, rigors or chills reported. She was last admitted to the hospital from 2020 to 2020. The patient was admitted with right hip fracture secondary to fall and underwent right hip hemiarthroplasty. PAST MEDICAL HISTORY:  1. Cerebral palsy.   2. Hypertension. 3.  Seizure disorder. ALLERGIES:  NO KNOWN DRUG ALLERGIES. MEDICATIONS:  1. Tylenol 650 mg every 6 hours. 2.  Calcium with vitamin D 1 tablet 3 times daily. 3.  Keppra 750 mg twice daily. 4.  Claritin 10 mg daily as needed. 5.  Kaur-Colace 1 tablet twice daily. FAMILY HISTORY:  Unable to obtain because the patient is nonverbal.    PAST SURGICAL HISTORY:  This is significant for right hip hemiarthroplasty. SOCIAL HISTORY:  No history of alcohol or tobacco abuse reported. REVIEW OF SYSTEMS:  Unable to obtain because the patient is nonverbal.    PHYSICAL EXAMINATION:  GENERAL APPEARANCE:  The patient appeared ill, in moderate distress. VITAL SIGNS:  On arrival at the emergency room; temperature 98.3, pulse 86, respiratory rate 18, blood pressure 139/90, oxygen saturation 95% on room air. HEENT:  Head:  Normocephalic, atraumatic. Eyes:  Unable to assess eye movement but no redness, no drainage, no discharge. Ears:  Normal external ears with no evidence of drainage. Nose:  No deformity, no drainage. Mouth and Throat:  No visible oral lesion. NECK:  Neck is supple. No JVD, no thyromegaly. CHEST:  Clear breath sounds. No wheezing, no crackles. HEART:  Normal S1 and S2, regular. No clinically appreciable murmur. ABDOMEN:  Soft, nontender. Normal bowel sounds. CNS:  Alert. Not oriented. EXTREMITIES:  No edema. Pulses 2+ bilaterally. MUSCULOSKELETAL SYSTEM:  Flexion contractures deformity of the upper and lower extremities are noted and present on admission. SKIN:  No active skin lesions seen in the exposed part of the body. PSYCHIATRY:  Unable to assess mood and affect. LYMPHATIC SYSTEM:  No cervical lymphadenopathy. DIAGNOSTIC DATA:  X-ray of the right hip shows possible acetabular cup rotation. No evidence for femoral component loosening. CT scan of the cervical spine, no visualized acute fracture.   CT scan of the head without contrast, no acute intracranial process. Cerebral atrophy. CT scan of the right lower extremity, nondisplaced fracture of the right pubic rami, subtle nondisplaced fracture of the right sacral wing. No femoral fracture or hardware complication. LABORATORY DATA:  Chemistry:  Sodium 138, potassium 5.1, chloride 112, CO2 of 24, glucose 101, BUN 11, creatinine 0.67, calcium 9.5, total bilirubin 1.1, ALT 24, AST 37, alkaline phosphatase 81, total protein 8.1, albumin level 3.5, globulin 4.6. Hematology:  WBC 5.8, hemoglobin 11.6, hematocrit 36.9, platelets 838. ASSESSMENT:  1. Right pubic rami fracture. 2.  Sacral wing fracture. 3.  Fall at home. 4.  Seizure disorder. 5.  Hypertension. 6.  Cerebral palsy. 7.  Thrombocytopenia. PLAN:  1. Right pubic rami fracture: We will place the patient on observation. We will carry out pain control. The patient will most likely require placement. Case Management consult will be requested for discharge planning. 2.  Sacral wing fracture: We will carry out pain control. The patient will require placement as stated above. 3.  Fall at home:  The patient sustained sacral fracture as well as right pubic rami fracture as a result of fall at home. We will carry out pain control while the patient is awaiting placement. 4.  Seizure disorder: We will continue with Keppra. We will place the patient on seizure precaution. 5.  Hypertension:  The patient is not on any medication at home for hypertension. We will monitor the patient's blood pressure closely. The patient may require antihypertensive medication at the time of discharge to home if average blood pressure reading is elevated. 6.  Cerebral palsy: We will carry out supportive therapy. 7.  Thrombocytopenia. This is mild. We will monitor the patient's platelet count. The patient is asymptomatic. OTHER ISSUES:  Code status: The patient is a full code. We will place the patient on heparin for DVT prophylaxis.   If there is further drop in the patient's platelet count, we will discontinue heparin and request SCD for DVT prophylaxis. FUNCTIONAL STATUS PRIOR TO ADMISSION:  The patient came from home. The patient is ambulatory with wheelchair. COVID PRECAUTION:  The patient was wearing a face mask. I was wearing a face mask, gloves, and cap for this patient's encounter.         Eliane Hilliard MD      [id number]  D:  09/08/2020 4:50  T:  09/08/2020 6:05  JOB #:  [id number]

## 2020-09-08 NOTE — PROGRESS NOTES
ABBE: Patient has CP and is non-verbal. PACE patient. Noted no plans for surgery. Patient lives at home with her sister/caregiver and also has caregivers daily through [id number] that assist with ADLs. Patient will likely need SNF placement. Therapy orders placed today. COVID testing will also be needed for placement (test ordered today)    Care Management Interventions  PCP Verified by CM: Yes  Mode of Transport at Discharge: Other (see comment)  Transition of Care Consult (CM Consult): Discharge Planning  MyChart Signup: No  Physical Therapy Consult: No  Occupational Therapy Consult: No  Speech Therapy Consult: No  Current Support Network: Lives with Caregiver  Confirm Follow Up Transport: Other (see comment)(TBD)  Discharge Location  Discharge Placement: Other:(TBD)     Reason for Admission:   Fall at home, patient found to have Closed fracture of multiple pubic rami, right,                    RUR Score:     N/A (obs)                Plan for utilizing home health:     Patient will likely need SNF, preference is [facility]. PCP: First and Last name:  Waiting on call back from [facility] to clarify PCP   Name of Practice: [facility]   Are you a current patient: Yes/No: yes   Approximate date of last visit:    Can you participate in a virtual visit with your PCP:                     Current Advanced Directive/Advance Care Plan: on file. Transition of Care Plan:   Likely SNF. Chart reviewed. CM noted patient had an admission in February 2020. Patient was discharged to [facility]. A UAI and level II screening was completed in February 2020. Patient lives with her sister/caregiver Zeinab Wilson #[phone]. Patient has caregivers through [facility] that come daily to assist with ADLs. CM attempted to contact sister, however VM box was full. CM called [facility] Estela Chaudhry with [employer]#[phone] and left voicemail message. CM spoke with sister/caregiver Zeinab Wilson.  Zeinab Wilson is also the caregiver for their mother at home. Kevin Martinez stated that [facility] caregivers did not come out yesterday and patient fell at home when Kevin Martinez was assisting their mother with a bath. Patient uses the wheelchair at home. CM discussed observation letter with sister via phone. Sister stated that if patient needs rehab again, she prefers [facility]. The Plan for Transition of Care is related to the following treatment goals: likely SNF    The Patient and/or patient representative sister Kevin Martinez was provided with a choice of provider and agrees   with the discharge plan. [x] Yes [] No    Freedom of choice list was provided with basic dialogue that supports the patient's individualized plan of care/goals, treatment preferences and shares the quality data associated with the providers. [x] Yes [] No    Observation notice provided in writing to patient and/or caregiver as well as verbal explanation of the policy. Patients who are in outpatient status also receive the Observation notice. CM will continue to follow to follow.      YAHAIRA Merlos/LENKA

## 2020-09-09 LAB
APPEARANCE UR: ABNORMAL
BACTERIA URNS QL MICRO: ABNORMAL /HPF
BILIRUB UR QL: NEGATIVE
COLOR UR: ABNORMAL
COVID-19, XGCOVT: NOT DETECTED
EPITH CASTS URNS QL MICRO: ABNORMAL /LPF
GLUCOSE UR STRIP.AUTO-MCNC: NEGATIVE MG/DL
HEALTH STATUS, XMCV2T: NORMAL
HGB UR QL STRIP: NEGATIVE
KETONES UR QL STRIP.AUTO: NEGATIVE MG/DL
LEUKOCYTE ESTERASE UR QL STRIP.AUTO: ABNORMAL
NITRITE UR QL STRIP.AUTO: NEGATIVE
PH UR STRIP: 5.5 [PH] (ref 5–8)
PROT UR STRIP-MCNC: NEGATIVE MG/DL
RBC #/AREA URNS HPF: ABNORMAL /HPF (ref 0–5)
SOURCE, COVRS: NORMAL
SP GR UR REFRACTOMETRY: 1.01 (ref 1–1.03)
SPECIMEN SOURCE, FCOV2M: NORMAL
SPECIMEN TYPE, XMCV1T: NORMAL
UROBILINOGEN UR QL STRIP.AUTO: 1 EU/DL (ref 0.2–1)
WBC URNS QL MICRO: ABNORMAL /HPF (ref 0–4)

## 2020-09-09 PROCEDURE — 97535 SELF CARE MNGMENT TRAINING: CPT

## 2020-09-09 PROCEDURE — 96374 THER/PROPH/DIAG INJ IV PUSH: CPT

## 2020-09-09 PROCEDURE — 74011000258 HC RX REV CODE- 258: Performed by: INTERNAL MEDICINE

## 2020-09-09 PROCEDURE — 87086 URINE CULTURE/COLONY COUNT: CPT

## 2020-09-09 PROCEDURE — 77030038269 HC DRN EXT URIN PURWCK BARD -A

## 2020-09-09 PROCEDURE — 97530 THERAPEUTIC ACTIVITIES: CPT

## 2020-09-09 PROCEDURE — 51798 US URINE CAPACITY MEASURE: CPT

## 2020-09-09 PROCEDURE — 77030019905 HC CATH URETH INTMIT MDII -A

## 2020-09-09 PROCEDURE — 99218 HC RM OBSERVATION: CPT

## 2020-09-09 PROCEDURE — 74011250636 HC RX REV CODE- 250/636: Performed by: INTERNAL MEDICINE

## 2020-09-09 PROCEDURE — 74011250637 HC RX REV CODE- 250/637: Performed by: INTERNAL MEDICINE

## 2020-09-09 PROCEDURE — 94760 N-INVAS EAR/PLS OXIMETRY 1: CPT

## 2020-09-09 PROCEDURE — 97162 PT EVAL MOD COMPLEX 30 MIN: CPT

## 2020-09-09 PROCEDURE — 97165 OT EVAL LOW COMPLEX 30 MIN: CPT

## 2020-09-09 PROCEDURE — 96372 THER/PROPH/DIAG INJ SC/IM: CPT

## 2020-09-09 PROCEDURE — 94761 N-INVAS EAR/PLS OXIMETRY MLT: CPT

## 2020-09-09 RX ORDER — CEFTRIAXONE 1 G/1
INJECTION, POWDER, FOR SOLUTION INTRAMUSCULAR; INTRAVENOUS
Status: DISPENSED
Start: 2020-09-09 | End: 2020-09-10

## 2020-09-09 RX ORDER — SODIUM CHLORIDE 900 MG/100ML
INJECTION INTRAVENOUS
Status: DISPENSED
Start: 2020-09-09 | End: 2020-09-10

## 2020-09-09 RX ADMIN — DOCUSATE SODIUM 50MG AND SENNOSIDES 8.6MG 1 TABLET: 8.6; 5 TABLET, FILM COATED ORAL at 17:26

## 2020-09-09 RX ADMIN — LEVETIRACETAM 750 MG: 500 TABLET ORAL at 08:34

## 2020-09-09 RX ADMIN — CEFTRIAXONE SODIUM 1 G: 1 INJECTION, POWDER, FOR SOLUTION INTRAMUSCULAR; INTRAVENOUS at 19:42

## 2020-09-09 RX ADMIN — LEVETIRACETAM 750 MG: 500 TABLET ORAL at 17:26

## 2020-09-09 RX ADMIN — Medication 10 ML: at 13:26

## 2020-09-09 RX ADMIN — OYSTER SHELL CALCIUM WITH VITAMIN D 1 TABLET: 500; 200 TABLET, FILM COATED ORAL at 13:26

## 2020-09-09 RX ADMIN — OYSTER SHELL CALCIUM WITH VITAMIN D 1 TABLET: 500; 200 TABLET, FILM COATED ORAL at 08:34

## 2020-09-09 RX ADMIN — Medication 10 ML: at 07:40

## 2020-09-09 RX ADMIN — ENOXAPARIN SODIUM 40 MG: 40 INJECTION SUBCUTANEOUS at 08:34

## 2020-09-09 RX ADMIN — OYSTER SHELL CALCIUM WITH VITAMIN D 1 TABLET: 500; 200 TABLET, FILM COATED ORAL at 17:27

## 2020-09-09 RX ADMIN — DOCUSATE SODIUM 50MG AND SENNOSIDES 8.6MG 1 TABLET: 8.6; 5 TABLET, FILM COATED ORAL at 08:34

## 2020-09-09 NOTE — PROGRESS NOTES
ABBE: Patient has CP and is non-verbal. PACE patient. Noted no plans for surgery. Patient lives at home with her sister/caregiver and also has caregivers daily through [id number] that assist with ADLs. [facility] SNF can accept patient. [facility] will provide auth and will arrange transport at discharge. COVID test pending. A UAI and level II screening was completed in February 2020. Chart reviewed. Referral has been sent to [facility] via [address]. Care management will continue to follow. CM spoke to Ethan, liaison for [facility]. They can accept patient. [facility] will need to provide insurance auth. CM called [facility] with [phone] # [phone]. They will provide the insurance auth and will arrange stretcher transport at discharge.     Ramirez Ramirez, LELAW/CRM

## 2020-09-09 NOTE — PROGRESS NOTES
Problem: Mobility Impaired (Adult and Pediatric)  Goal: *Acute Goals and Plan of Care (Insert Text)  Description: FUNCTIONAL STATUS PRIOR TO ADMISSION: Per pt's sister's report, pt was only walking short distances with a gait belt and assistance. Pt with history of CP, nonverbal at baseline. HOME SUPPORT PRIOR TO ADMISSION: The patient lived with sister and mother. Pt's sister is a caregiver to both the pt and mother and has additional home health care staff to assist. . Physical Therapy Goals  Initiated 9/9/2020  1. Patient will move from supine to sit and sit to supine  in bed with maximal assistance within 7 day(s). 2.  Patient will transfer from bed to chair and chair to bed with maximal assistance using the least restrictive device within 7 day(s). 3.  Patient will perform sit to stand with maximal assistance within 7 day(s). 4.  Patient will ambulate with maximal assistance for 5 feet with the least restrictive device within 7 day(s). Outcome: Progressing Towards Goal   PHYSICAL THERAPY EVALUATION  Patient: Roselia Irwin (53 y.o. female)  Date: 9/9/2020  Primary Diagnosis: Closed fracture of multiple pubic rami, right, initial encounter [facility]) [S32.591A]        Precautions:   Fall, WBAT(nonverbal, CP )      ASSESSMENT  Based on the objective data described below, the patient presents with grossly decreased ROM and strength in all extremities, nonverbal at baseline secondary to history of CP. Pt received supine in bed with legs crossed over one another, bilateral UEs in flexed posture. Pt required total assistance x 2 supine<>sit EOB. Pt with increased forward head and trunk posture and required max assist to support trunk posteriorly. Pt returned to supine position with pillows placed for comfort and offloading. Pt is functioning well below reported baseline. Recommend SNF placement upon discharge to continue therapy efforts. .    Current Level of Function Impacting Discharge (mobility/balance): total A x 2 supine<> sit, poor sitting balance    Functional Outcome Measure: The patient scored Total: 0/100 on the Barthel Index which is indicative of 100% impaired ability to care for basic self needs/dependency on others. Patient will benefit from skilled therapy intervention to address the above noted impairments. PLAN :  Recommendations and Planned Interventions: bed mobility training, transfer training, gait training, therapeutic exercises, neuromuscular re-education, patient and family training/education, and therapeutic activities      Frequency/Duration: Patient will be followed by physical therapy:  3 times a week to address goals. Recommendation for discharge: (in order for the patient to meet his/her long term goals)  Therapy up to 5 days/week in SNF setting    This discharge recommendation:  Has been made in collaboration with the attending provider and/or case management    IF patient discharges home will need the following DME: mechanical lift; total care, total assist level for all mobility         SUBJECTIVE:   Patient is nonverbal    OBJECTIVE DATA SUMMARY:   HISTORY:    Past Medical History:   Diagnosis Date    Epilepsy ([facility] [address]     Gait instability     Hypertension     Neurological disorder     Seizures ([facility] [address] [address]     UTI (urinary tract infection)     Vitamin D deficiency    History reviewed. No pertinent surgical history. Personal factors and/or comorbidities impacting plan of care:     Home Situation  Home Environment: Private residence  Living Alone: No  Support Systems: Family member(s), Home care staff  Patient Expects to be Discharged to[de-identified] Skilled nursing facility    EXAMINATION/PRESENTATION/DECISION MAKING:   Critical Behavior:  Neurologic State: Alert  Orientation Level: Unable to verbalize  Cognition: Unable to assess (comment)     Hearing:   Auditory  Auditory Impairment: None  Skin:    Edema:   Range Of Motion:  AROM: Grossly decreased, non-functional Strength:    Strength: Grossly decreased, non-functional                    Tone & Sensation:   Tone: Abnormal(spastic UEs/LEs)                              Coordination:     Vision:      Functional Mobility:  Bed Mobility:  Rolling: Total assistance  Supine to Sit: Total assistance;Assist x2  Sit to Supine: Total assistance;Assist x2  Scooting: Total assistance  Transfers:  Sit to Stand: (NT)                          Balance:   Sitting: Impaired  Sitting - Static: Poor (constant support)  Ambulation/Gait Training:                                                         Stairs: Therapeutic Exercises:       Functional Measure:  Barthel Index:    Bathin  Bladder: 0  Bowels: 0  Groomin  Dressin  Feedin  Mobility: 0  Stairs: 0  Toilet Use: 0  Transfer (Bed to Chair and Back): 0  Total: 0/100       The Barthel ADL Index: Guidelines  1. The index should be used as a record of what a patient does, not as a record of what a patient could do. 2. The main aim is to establish degree of independence from any help, physical or verbal, however minor and for whatever reason. 3. The need for supervision renders the patient not independent. 4. A patient's performance should be established using the best available evidence. Asking the patient, friends/relatives and nurses are the usual sources, but direct observation and common sense are also important. However direct testing is not needed. 5. Usually the patient's performance over the preceding 24-48 hours is important, but occasionally longer periods will be relevant. 6. Middle categories imply that the patient supplies over 50 per cent of the effort. 7. Use of aids to be independent is allowed. Lu Solorio., Barthel, D.W. (8847). Functional evaluation: the Barthel Index. [facility] (14)2. RAD Balbuena, Todd Noe., Evi Davila., Eduin, [address] ().  Measuring the change indisability after inpatient rehabilitation; comparison of the responsiveness of the Barthel Index and Functional [place] Measure. Journal of Neurology, Neurosurgery, and Psychiatry, 66(4), 782-842. BLOSSOM Jules.A, ESTEFANI Vela, & Hugo Fields M.A. (2004.) Assessment of post-stroke quality of life in cost-effectiveness studies: The usefulness of the Barthel Index and the EuroQoL-5D. Quality of Life Research, 13, 369-20           Based on the above components, the patient evaluation is determined to be of the following complexity level: LOW     Pain Rating:  Pt appeared to be in no apparent distress    Activity Tolerance:   Fair  Please refer to the flowsheet for vital signs taken during this treatment. After treatment patient left in no apparent distress:   Supine in bed, Call bell within reach, and Side rails x 3    COMMUNICATION/EDUCATION:   The patients plan of care was discussed with: Occupational therapist and Registered nurse. Patient is unable to participate in goal setting and plan of care.     Thank you for this referral.  Katie Leong, PT, DPT   Time Calculation: 16 mins

## 2020-09-09 NOTE — PROGRESS NOTES
OCCUPATIONAL THERAPY EVALUATION/DISCHARGE  Patient: Rogelio Vargas (27 y.o. female)  Date: 9/9/2020  Primary Diagnosis: Closed fracture of multiple pubic rami, right, initial encounter [facility]) [S32.016N]       Precautions:  Fall, WBAT(nonverbal, CP )    ASSESSMENT  Based on the objective data described below, the patient presents at or near baseline LOF, requiring total A for all ADLs. Pt received supine in bed, RLE crossed over L, BUE in flexed posture, and neck in forward flexion. Pt required Total A x 2 to sit EOB, returned to supine, and total A for washing face despite tactile & verbal cues to grasp washcloth. Per sister, patient performs mobility at home with assist x 1 and gait belt, and can wash her face sometimes. Otherwise, sister completes all dressing, grooming, and feeding for pt, and a nurse comes daily for ~1 hour to shower pt. Therefore, no acute OT needs at this time as pt is likely near her baseline LOF. Recommend d/c to SNF level rehab. Will sign off, but please re-consult if there is change in pt status. Current Level of Function (ADLs/self-care): Total A    Functional Outcome Measure: The patient scored 0 on the Barthel Index outcome measure which is indicative of 100% functional impairment. Other factors to consider for discharge: pt receives total A from sister for all ADLs at baseline     PLAN :  Recommend with staff: prop BLE and BUE on pillows when supine    Recommendation for discharge: (in order for the patient to meet his/her long term goals)  Therapy up to 5 days/week in SNF setting    This discharge recommendation:  Has been made in collaboration with the attending provider and/or case management    IF patient discharges home will need the following DME: TBD       SUBJECTIVE:   Patient nonverbal at baseline (although sister says she will tell people to get out or \"curse like a \" occasionally).     OBJECTIVE DATA SUMMARY:   HISTORY:   Past Medical History: Diagnosis Date    Epilepsy [facility])     Gait instability     Hypertension     Neurological disorder     Seizures (HCC)     UTI (urinary tract infection)     Vitamin D deficiency    History reviewed. No pertinent surgical history. Prior Level of Function/Environment/Context: lives with sister who provides assist with all ADLs  Expanded or extensive additional review of patient history:   Home Situation  Home Environment: Private residence  Living Alone: No  Support Systems: Family member(s), Home care staff  Patient Expects to be Discharged to[de-identified] [address] [place] [address] Avenue:  Cognitive/Behavioral Status:        Cognition: Unable to assess (comment)(nonverbal)  Perception: Appears intact     Safety/Judgement: Not assessed    Skin: intact (bruising noted on R side of face from fall)    Edema: none noted    Hearing: Auditory  Auditory Impairment: None    Vision/Perceptual:                                     Range of Motion:    AROM: Grossly decreased, non-functional                         Strength:    Strength: Grossly decreased, non-functional                Coordination:     Fine Motor Skills-Upper: Right Impaired;Left Impaired    Gross Motor Skills-Upper: Left Impaired;Right Impaired    Tone & Sensation:    Tone: Abnormal(spastic UEs/LEs)                         Balance:  Sitting: Impaired  Sitting - Static: Poor (constant support)    Functional Mobility and Transfers for ADLs:  Bed Mobility:  Rolling: Total assistance  Supine to Sit: Total assistance;Assist x2  Sit to Supine: Total assistance;Assist x2  Scooting: Total assistance    Transfers:  Sit to Stand: (NT)    ADL Assessment:  Feeding: Total assistance(at baseline, per sister)    Oral Facial Hygiene/Grooming: Total assistance(at baseline, per sister)    Bathing: Total assistance(at baseline, per sister)    Upper Body Dressing: Total assistance(at baseline, per sister)    Lower Body Dressing:  Total assistance(at baseline, per sister)    Toileting: Total assistance(wears brief at baseline, per sister)                ADL Intervention and task modifications:       Grooming  Grooming Assistance: Total assistance(dependent)  Position Performed: (supine in bed)  Washing Face: Total assistance (dependent)                             Cognitive Retraining  Safety/Judgement: Not assessed      Functional Measure:  Barthel Index:    Bathin  Bladder: 0  Bowels: 0  Groomin  Dressin  Feedin  Mobility: 0  Stairs: 0  Toilet Use: 0  Transfer (Bed to Chair and Back): 0  Total: 0/100        The Barthel ADL Index: Guidelines  1. The index should be used as a record of what a patient does, not as a record of what a patient could do. 2. The main aim is to establish degree of independence from any help, physical or verbal, however minor and for whatever reason. 3. The need for supervision renders the patient not independent. 4. A patient's performance should be established using the best available evidence. Asking the patient, friends/relatives and nurses are the usual sources, but direct observation and common sense are also important. However direct testing is not needed. 5. Usually the patient's performance over the preceding 24-48 hours is important, but occasionally longer periods will be relevant. 6. Middle categories imply that the patient supplies over 50 per cent of the effort. 7. Use of aids to be independent is allowed. Patsy Fregoso, Barthel, D.W. (0189). Functional evaluation: the Barthel Index. [facility] (14)2. RAD Mckinney, Wanda Campos, Kofi Sauer., [place], [address] [place] [address] (). Measuring the change indisability after inpatient rehabilitation; comparison of the responsiveness of the Barthel Index and Functional Eugene Measure. Journal of Neurology, Neurosurgery, and Psychiatry, 66(4), 165-672.   Marlon Johnson, N.J.A, ESTEFANI Vela, Rufina Metcalf, M.A. (2004.) Assessment of post-stroke quality of life in cost-effectiveness studies: The usefulness of the Barthel Index and the EuroQoL-5D. Quality of Life Research, 15, 007-18         Occupational Therapy Evaluation Charge Determination   History Examination Decision-Making   LOW Complexity : Brief history review  HIGH Complexity : 5 or more performance deficits relating to physical, cognitive , or psychosocial skils that result in activity limitations and / or participation restrictions HIGH Complexity : Patient presents with comorbidities that affect occupational performance. Signifigant modification of tasks or assistance (eg, physical or verbal) with assessment (s) is necessary to enable patient to complete evaluation       Based on the above components, the patient evaluation is determined to be of the following complexity level: LOW   Pain Rating:  No reports of pain    Activity Tolerance:   Poor  Please refer to the flowsheet for vital signs taken during this treatment. After treatment patient left in no apparent distress:    Supine in bed, Heels elevated for pressure relief, Call bell within reach and Side rails x 3    COMMUNICATION/EDUCATION:   The patients plan of care was discussed with: Physical therapist and Registered nurse. Thank you for this referral.  RADHA Alfaro     Regarding student involvement in patient care:  A student participated in this treatment session. Per CMS Medicare statements and AOTA guidelines I certify that the following was true:  1. I was present and directly observed the entire session. 2. I made all skilled judgments and clinical decisions regarding care. 3. I am the practitioner responsible for assessment, treatment, and documentation.

## 2020-09-09 NOTE — PROGRESS NOTES
Hospitalist Progress Note  Andria Nelson MD  Answering service: [phone] from in house phone      Date of Service:  2020  NAME:  Scott Posey  :  1945  MRN:  [id number]    Admission Summary:   75F p/w Fall, pubic ramus #  Interval history / Subjective:   Patient seen and examined at bedside. Non verbal. Appears comfortable. No acute events overnight. A/w palcement     Assessment & Plan:     #. Fall: at home. #. Fracture, R pubic ramus and sacrum:  - Analgesia prn, Ortho eval- conservative Mg. PT/OT- CM for dc planning. COVID screen pending    #. HTN: chronic, stable, Home regimen, PRN BP meds. Monitor  #. Seizure disorder: stable. Home regimen. #. Disability: long term. Cerebral palsy- Supportive care. Encourage family to stay. #. Thrombocytopenia: mild. Monitor  #. HypoKalemia: Acute, replace, monitor    Code status: Full  DVT prophylaxis: Lovenox  Care Plan discussed with: Patient/Family and Nurse  Disposition: TBD     Hospital Problems  Date Reviewed: 2020          Codes Class Noted POA    * (Principal) Closed fracture of multiple pubic rami, right, initial encounter [facility]) ICD-10-CM: A53.230D  ICD-9-CM: 808.2  2020 Yes            Review of Systems:   Pertinent items are mentioned in interval history. Vital Signs:    Last 24hrs VS reviewed since prior progress note.  Most recent are:  Visit Vitals  /84 (BP 1 Location: Left arm, BP Patient Position: At rest)   Pulse 69   Temp 98.7 °F (37.1 °C)   Resp 15   Wt 52 kg (114 lb 10.2 oz)   SpO2 94%   BMI 22.39 kg/m²       No intake or output data in the 24 hours ending 20 0925     Physical Examination:   General:  Alert, oriented, No acute distress, facial bruise on R side- old  Abd:  Soft, non-tender, non-distended  Extremities:  No cyanosis or clubbing, no significant edema  Neuro:  Grossly normal, no obvious focal neuro deficits  Psych:  no insight, not agitated. Data Review:    Review and/or order of clinical lab test  Review and/or order of tests in the radiology section of CPT  Review and/or order of tests in the medicine section of CPT  Labs:     Recent Labs     09/08/20 0443 09/07/20 2253   WBC 5.4 5.8   HGB 11.1* 11.6   HCT 35.5 36.9   * 142*     Recent Labs     09/08/20 0443 09/07/20 2051    138   K 3.4* 5.1   * 112*   CO2 24 24   BUN 9 11   CREA 0.48* 0.67   GLU 95 101*   CA 9.0 9.5     Recent Labs     09/08/20 0443 09/07/20 2051   ALT 18 24   AP 75 81   TBILI 1.1* 1.1*   TP 7.1 8.1   ALB 3.1* 3.5   GLOB 4.0 4.6*     No results for input(s): INR, PTP, APTT, INREXT, INREXT in the last 72 hours. No results for input(s): FE, TIBC, PSAT, FERR in the last 72 hours. No results found for: FOL, RBCF   No results for input(s): PH, PCO2, PO2 in the last 72 hours. No results for input(s): CPK, CKNDX, TROIQ in the last 72 hours.     No lab exists for component: CPKMB  No results found for: CHOL, CHOLX, CHLST, CHOLV, HDL, HDLP, LDL, LDLC, DLDLP, TGLX, TRIGL, TRIGP, CHHD, CHHDX  No results found for: GLUCPOC  Lab Results   Component Value Date/Time    Color DARK YELLOW 01/23/2020 02:30 PM    Appearance CLEAR 01/23/2020 02:30 PM    Specific gravity 1.027 01/23/2020 02:30 PM    Specific gravity 1.024 02/22/2017 09:20 AM    pH (UA) 5.5 01/23/2020 02:30 PM    Protein TRACE (A) 01/23/2020 02:30 PM    Glucose NEGATIVE  01/23/2020 02:30 PM    Ketone NEGATIVE  01/23/2020 02:30 PM    Bilirubin NEGATIVE  01/23/2020 02:30 PM    Urobilinogen 2.0 (H) 01/23/2020 02:30 PM    Nitrites NEGATIVE  01/23/2020 02:30 PM    Leukocyte Esterase TRACE (A) 01/23/2020 02:30 PM    Epithelial cells FEW 01/23/2020 02:30 PM    Bacteria 1+ (A) 01/23/2020 02:30 PM    WBC 0-4 01/23/2020 02:30 PM    RBC 0-5 01/23/2020 02:30 PM     Medications Reviewed:     Current Facility-Administered Medications   Medication Dose Route Frequency    levETIRAcetam (KEPPRA) tablet 750 mg 750 mg Oral BID    loratadine (CLARITIN) tablet 10 mg  10 mg Oral DAILY PRN    senna-docusate (PERICOLACE) 8.6-50 mg per tablet 1 Tab  1 Tab Oral BID    calcium-vitamin D (OS-BROOKLYN) 500 mg-200 unit tablet  1 Tab Oral TID WITH MEALS    sodium chloride (NS) flush 5-40 mL  5-40 mL IntraVENous Q8H    sodium chloride (NS) flush 5-40 mL  5-40 mL IntraVENous PRN    acetaminophen (TYLENOL) tablet 650 mg  650 mg Oral Q6H PRN    Or    acetaminophen (TYLENOL) suppository 650 mg  650 mg Rectal Q6H PRN    promethazine (PHENERGAN) tablet 12.5 mg  12.5 mg Oral Q6H PRN    Or    ondansetron (ZOFRAN) injection 4 mg  4 mg IntraVENous Q6H PRN    enoxaparin (LOVENOX) injection 40 mg  40 mg SubCUTAneous DAILY    morphine injection 2 mg  2 mg IntraVENous Q4H PRN   ______________________________________________________________________  EXPECTED LENGTH OF STAY: - - -  ACTUAL LENGTH OF STAY:          0               Martin Haddad MD

## 2020-09-09 NOTE — PROGRESS NOTES
Bedside and Verbal shift change report given to Hugo Eubanks RN (oncoming nurse) by Lopez Bah RN (offgoing nurse). Report included the following information SBAR, Kardex, Intake/Output, MAR and Accordion.

## 2020-09-10 VITALS
HEART RATE: 73 BPM | TEMPERATURE: 98.4 F | WEIGHT: 114.64 LBS | SYSTOLIC BLOOD PRESSURE: 125 MMHG | RESPIRATION RATE: 20 BRPM | DIASTOLIC BLOOD PRESSURE: 78 MMHG | OXYGEN SATURATION: 98 % | BODY MASS INDEX: 22.39 KG/M2

## 2020-09-10 PROBLEM — R56.9 SEIZURE (HCC): Status: ACTIVE | Noted: 2020-09-10

## 2020-09-10 PROBLEM — I10 HYPERTENSION: Status: ACTIVE | Noted: 2020-09-10

## 2020-09-10 PROBLEM — G80.9 CEREBRAL PALSY (HCC): Status: ACTIVE | Noted: 2020-09-10

## 2020-09-10 LAB
BACTERIA SPEC CULT: NORMAL
SERVICE CMNT-IMP: NORMAL

## 2020-09-10 PROCEDURE — 74011250637 HC RX REV CODE- 250/637: Performed by: INTERNAL MEDICINE

## 2020-09-10 PROCEDURE — 99218 HC RM OBSERVATION: CPT

## 2020-09-10 PROCEDURE — 96372 THER/PROPH/DIAG INJ SC/IM: CPT

## 2020-09-10 PROCEDURE — 77030038269 HC DRN EXT URIN PURWCK BARD -A

## 2020-09-10 PROCEDURE — 74011250636 HC RX REV CODE- 250/636: Performed by: INTERNAL MEDICINE

## 2020-09-10 RX ORDER — OXYCODONE HYDROCHLORIDE 5 MG/1
2.5 TABLET ORAL
Qty: 12 TAB | Refills: 0 | Status: SHIPPED | OUTPATIENT
Start: 2020-09-10 | End: 2020-09-13

## 2020-09-10 RX ADMIN — DOCUSATE SODIUM 50MG AND SENNOSIDES 8.6MG 1 TABLET: 8.6; 5 TABLET, FILM COATED ORAL at 10:18

## 2020-09-10 RX ADMIN — OYSTER SHELL CALCIUM WITH VITAMIN D 1 TABLET: 500; 200 TABLET, FILM COATED ORAL at 10:18

## 2020-09-10 RX ADMIN — LEVETIRACETAM 750 MG: 500 TABLET ORAL at 10:18

## 2020-09-10 RX ADMIN — ENOXAPARIN SODIUM 40 MG: 40 INJECTION SUBCUTANEOUS at 10:18

## 2020-09-10 NOTE — PROGRESS NOTES
Problem: Pain  Goal: *Control of Pain  Outcome: Progressing Towards Goal     Problem: Patient Education: Go to Patient Education Activity  Goal: Patient/Family Education  Outcome: Progressing Towards Goal     Problem: Falls - Risk of  Goal: *Absence of Falls  Description: Document Newton Palaciosfrancisco j Fall Risk and appropriate interventions in the flowsheet.   Outcome: Progressing Towards Goal  Note: Fall Risk Interventions:  Mobility Interventions: Communicate number of staff needed for ambulation/transfer, Patient to call before getting OOB, Utilize walker, cane, or other assistive device    Mentation Interventions: Door open when patient unattended    Medication Interventions: Evaluate medications/consider consulting pharmacy, Patient to call before getting OOB    Elimination Interventions: Call light in reach, Patient to call for help with toileting needs, Toileting schedule/hourly rounds    History of Falls Interventions: Bed/chair exit alarm, Door open when patient unattended, Room close to nurse's station

## 2020-09-10 NOTE — DISCHARGE SUMMARY
Discharge Summary       PATIENT ID: Manju Smiht  MRN: [id number]   YOB: 1945    DATE OF ADMISSION: 9/7/2020  5:13 PM    DATE OF DISCHARGE: 09/10/2020   PRIMARY CARE PROVIDER: None     DISCHARGING PROVIDER: Cony Cobos MD    To contact this individual call [phone] and ask the  to page. If unavailable ask to be transferred the Adult Hospitalist Department. CONSULTATIONS: IP CONSULT TO ORTHOPEDIC SURGERY    PROCEDURES/SURGERIES: * No surgery found *    ADMITTING DIAGNOSES & HOSPITAL COURSE:   Closed pubic ramus fracture - non op  Cerebral palsy   Hypertension  Seizure disorder    Per admission H and P   This is a 53-year-old woman with past medical history significant for seizure disorder, hypertension, and cerebral palsy, who was in her usual state of health until the day of her presentation at the emergency room when the patient fell at home. According to report, the patient's sister reported hearing the patient's fall. She did not witness the fall and when she went to check on the patient, found the patient lying on the floor on the left side of her body. The patient was unable to bear weight on the left leg when the sister attempts to get the patient into her wheelchair. Patient was admitted to the hospital, orthopedic surgery was consulted. They evaluated her prior hemiarthroplasty, and this was stable and in good condition. Orthopedics recommended nonsurgical intervention, medical management. She is able to bear weight as tolerated. She is wheelchair-bound at baseline, however was able to help with transfers to and from the wheelchair. PT and OT of recommended SNF, and patient is stable for discharge. DISCHARGE DIAGNOSES / PLAN:      Fall: at home. Fracture, R pubic ramus and sacrum:  - Analgesia prn, Ortho eval- conservative Mg. PT/OT- CM for dc planning. COVID negative for screening      #. HTN: chronic, stable, Home regimen, PRN BP meds. Monitor  #. Seizure disorder: stable. Home regimen. #. Disability: long term. Cerebral palsy- Supportive care. Encourage family to stay. #. Thrombocytopenia: mild. Monitor  #. HypoKalemia: Acute, replace, monitor     ADDITIONAL CARE RECOMMENDATIONS:   - Please take all medications as prescribed. Note changes as below. ** Add low dose oxycodone if having pain, has been relatively well controlled on oral tylenol alone. - Please make sure to follow up with your primary care physician within 1-2 weeks of discharge for hospital follow up. You should also follow up with orthopedics regarding your pubic ramus fracture. - Patient had UA due to \"strong odor\", which was neg for nitrates, leukocytes, and WBC. Still had urine culture sent and was given 1 dose CTX. Will DC given UA bland, and patient asymtpomatic. If having fevers, or worsening mentation could consider re-testing and treating.   - Please get up slowly from a seated or laying position, avoid falls. - Avoid tobacco, alcohol and other illicit drug use. PENDING TEST RESULTS:   At the time of discharge the following test results are still pending: Urine culture - no WBC, afebrile, and no WBC in urine. UA bland.      FOLLOW UP APPOINTMENTS:    Follow-up Information     Follow up With Specialties Details Why Contact Info    Kraig Taylor MD Orthopedic Surgery Schedule an appointment as soon as possible for a visit  [address] [facility] [address] [facility] Emergency Medicine  If symptoms worsen Nasra Moultonolucmaryann [address] [facility]    [address]  [phone]             DIET: Resume previous diet    ACTIVITY: PT/OT Eval and Treat    WOUND CARE: None    EQUIPMENT needed: Per PT/OT       DISCHARGE MEDICATIONS:  Current Discharge Medication List      START taking these medications    Details   oxyCODONE IR (Roxicodone) 5 mg immediate release tablet Take 0.5 Tabs by mouth every six (6) hours as needed for Pain for up to 3 days. Max Daily Amount: 10 mg.  Qty: 12 Tab, Refills: 0    Associated Diagnoses: Closed fracture of multiple pubic rami, right, initial encounter ([facility] [address]         CONTINUE these medications which have NOT CHANGED    Details   levETIRAcetam (KEPPRA) 750 mg tablet Take 1 Tab by mouth two (2) times a day. Qty: 60 Tab, Refills: 0      acetaminophen (TYLENOL) 325 mg tablet Take 2 Tabs by mouth every six (6) hours. Qty: 90 Tab, Refills: 0      calcium-vitamin D (OYSTER SHELL) 500 mg(1,250mg) -200 unit per tablet Take 1 Tab by mouth three (3) times daily (with meals). Qty: 60 Tab, Refills: 0      senna-docusate (PERICOLACE) 8.6-50 mg per tablet Take 1 Tab by mouth two (2) times a day. Qty: 30 Tab, Refills: 0      loratadine (CLARITIN) 10 mg tablet Take 10 mg by mouth daily as needed (Congestion, cough). polyethylene glycol (MIRALAX) 17 gram packet Take 1 Packet by mouth daily. Qty: 30 Each, Refills: 0      bisacodyL (DULCOLAX, BISACODYL,) 10 mg suppository Insert 10 mg into rectum daily as needed. docusate sodium (COLACE) 100 mg capsule Take 100 mg by mouth daily. NOTIFY YOUR PHYSICIAN FOR ANY OF THE FOLLOWING:   Fever over 101 degrees for 24 hours. Chest pain, shortness of breath, fever, chills, nausea, vomiting, diarrhea, change in mentation, falling, weakness, bleeding. Severe pain or pain not relieved by medications. Or, any other signs or symptoms that you may have questions about.     DISPOSITION:    Home With:   OT  PT  HH  RN      X Long term SNF/Inpatient Rehab    Independent/assisted living    Hospice    Other:       PATIENT CONDITION AT DISCHARGE:     Functional status   X Poor     Deconditioned     Independent      Cognition     Lucid     Forgetful    X Developmental delay with non verbal baseline (Cerebral palsy) Catheters/lines (plus indication)    [place]     PICC     PEG    X None      Code status    X Full code     DNR      PHYSICAL EXAMINATION AT DISCHARGE:  Visit Vitals  /69 (BP 1 Location: Left arm, BP Patient Position: At rest)   Pulse 73   Temp 98.1 °F (36.7 °C)   Resp 16   Wt 52 kg (114 lb 10.2 oz)   SpO2 100%   BMI 22.39 kg/m²     Gen: NAD, awake in bed, non verbal baseline,  HEENT: NC/AT, sclera anicteric, PERRL, EOMI, poor dentition   CV: RRR no m/r/g, normal S1 and S2, no pedal edema   Resp: CTA b/l no increased work of breathing, no wheezing or rhonchi,   Abd: NT/ND, normal bowel sounds, no rebound or guarding  Ext: 2+ pulses, no edema, bilateral UE contractures.    Skin: No rashes or lesions      CHRONIC MEDICAL DIAGNOSES:  Problem List as of 9/10/2020 Date Reviewed: 9/10/2020          Codes Class Noted - Resolved    Seizure ([facility] ICD-10-CM: R56.9  ICD-9-CM: 780.39  9/10/2020 - Present        Hypertension ICD-10-CM: I10  ICD-9-CM: 401.9  9/10/2020 - Present        Cerebral palsy ([facility] ICD-10-CM: G80.9  ICD-9-CM: 343.9  9/10/2020 - Present        * (Principal) Closed fracture of multiple pubic rami, right, initial encounter ([facility] ICD-10-CM: M56.878L  ICD-9-CM: 808.2  9/7/2020 - Present        [facility] ICD-10-CM: K99.685Q  ICD-9-CM: 820.8  1/23/2020 - Present              Greater than 30 minutes were spent with the patient on counseling and coordination of care    Signed:   Alexis Flaherty MD  9/10/2020  10:17 AM

## 2020-09-10 NOTE — PROGRESS NOTES
Bedside shift change report given to Armando Alarcon and Mohamud Hernandez RN (oncoming nurse) by Lesli Pavon RN (offgoing nurse). Report included the following information SBAR, Kardex, ED Summary, Procedure Summary, Intake/Output and MAR.

## 2020-09-10 NOTE — PROGRESS NOTES
Bedside and Verbal shift change report given to Zafar Lindsey and Teresa Anthony (oncoming nurse) by Garry Austin (offgoing nurse). Report included the following information SBAR, Kardex, Intake/Output, MAR and Accordion.

## 2020-09-10 NOTE — PROGRESS NOTES
ABBE: [facility] can accept patient and Alanna Santos has been approved. [facility] stretcher transport will be here at 2:15 PM. COVID test negative 9/8. A UAI and level II screening was completed in February 2020. Discharge folder located on hard chart to include ambulance form. RN to follow with discharge papers and call report to #[phone]. CM called Mathew Godoy with [address] # [phone]. He is calling the facility with the authorization information. Transition of Care Plan to SNF/Rehab    SNF/Rehab Transition:  Patient has been accepted to [facility] and meets criteria for admission. [facility] and expected to leave at 2:15 PM    Communication to Patient/Family:  Met with patient and left voicemail message for the sister and they are agreeable to the transition plan. Communication to SNF/Rehab:  Bedside RN, Moshe Jerry, has been notified to update the transition plan to the facility and call report (phone number #[phone]). Discharge information has been updated on the AVS.     Discharge instructions are available to view via [address]. Nursing Please include all hard scripts for controlled substances, med rec and dc summary, and AVS in packet. Reviewed and confirmed with facility, [facility] in admissions, can manage the patient care needs for the following:     SNF/Rehab Transition:  Patient to follow-up with Home Health:  [facility], other ,none)  PCP/Specialist: follow-up after SNF    Anne Sitter with (X) only those applicable:    Medication:  [x]  Medications will be available at the facility  []  IV Antibiotics   []  Controlled Substance - hard copy to be sent with patient   []  Weekly Labs   Documents:  [x] Hard RX  [x] MAR  [x] Kardex  [x] AVS  [x]Transfer Summary  [x]Discharge   Equipment:  []  CPAP/BiPAP  []  Wound Vacuum  []  Robledo or Urinary Device  []  PICC/Central Line  []  Nebulizer  []  Ventilator   Treatment:  []Isolation (for MRSA, VRE, etc.)  []Surgical Drain Management  []Tracheostomy Care  []Dressing Changes  []Dialysis with transportation and chair time  []PEG Care  []Oxygen  []Daily Weights for Heart Failure   Dietary:  []Any diet limitations  []Tube Feedings   []Total Parenteral Management (TPN)   Eligible for Medicaid Long Term Services and Supports  Yes:  [] Eligible for medical assistance or will become eligible within 180 days and UAI completed. [] Provider/Patient and/or support system has requested screening. [x] UAI copy provided to patient or responsible party, UAI and level II was done in February 2020  [] UAI unavailable at discharge will send once processed to [facility] provider. [] UAI unavailable at discharged mailed to patient  No:   [] Private pay and is not financially eligible for Medicaid within the next 180 days. [] Reside out-of-state.   [] A residents of a state owned/operated facility that is licensed  by [place] [address] [address] [facility] [employer] [facility] or [facility]  [] Enrollment in [facility] hospice services  [] [address] [place] [address]  [] Patient /Family declines to have screening completed or provide financial information for screening     Financial Resources:  Medicaid    [] Initiated and application pending   [] Full coverage     Advanced Care Plan:  []Surrogate Decision Maker of Care  []POA  []Communicated Code Status (DDNR\", \"Full\")    Other

## 2020-09-10 NOTE — DISCHARGE INSTRUCTIONS
Discharge Summary       PATIENT ID: Kelly Montiel  MRN: [id number]   YOB: 1945    DATE OF ADMISSION: 9/7/2020  5:13 PM    DATE OF DISCHARGE: 09/10/2020   PRIMARY CARE PROVIDER: None     DISCHARGING PROVIDER: Elvia Doty MD    To contact this individual call [phone] and ask the  to page. If unavailable ask to be transferred the Adult Hospitalist Department. CONSULTATIONS: IP CONSULT TO ORTHOPEDIC SURGERY    PROCEDURES/SURGERIES: * No surgery found *    ADMITTING DIAGNOSES & HOSPITAL COURSE:   Closed pubic ramus fracture - non op  Cerebral palsy   Hypertension  Seizure disorder    Per admission H and P   This is a 77-year-old woman with past medical history significant for seizure disorder, hypertension, and cerebral palsy, who was in her usual state of health until the day of her presentation at the emergency room when the patient fell at home. According to report, the patient's sister reported hearing the patient's fall. She did not witness the fall and when she went to check on the patient, found the patient lying on the floor on the left side of her body. The patient was unable to bear weight on the left leg when the sister attempts to get the patient into her wheelchair. Patient was admitted to the hospital, orthopedic surgery was consulted. They evaluated her prior hemiarthroplasty, and this was stable and in good condition. Orthopedics recommended nonsurgical intervention, medical management. She is able to bear weight as tolerated. She is wheelchair-bound at baseline, however was able to help with transfers to and from the wheelchair. PT and OT of recommended SNF, and patient is stable for discharge. DISCHARGE DIAGNOSES / PLAN:      Fall: at home. Fracture, R pubic ramus and sacrum:  - Analgesia prn, Ortho eval- conservative Mg. PT/OT- CM for dc planning. COVID negative for screening      #. HTN: chronic, stable, Home regimen, PRN BP meds. Monitor  #. Seizure disorder: stable. Home regimen. #. Disability: long term. Cerebral palsy- Supportive care. Encourage family to stay. #. Thrombocytopenia: mild. Monitor  #. HypoKalemia: Acute, replace, monitor     ADDITIONAL CARE RECOMMENDATIONS:   - Please take all medications as prescribed. Note changes as below. ** Add low dose oxycodone if having pain, has been relatively well controlled on oral tylenol alone. - Please make sure to follow up with your primary care physician within 1-2 weeks of discharge for hospital follow up. You should also follow up with orthopedics regarding your pubic ramus fracture. - Patient had UA due to \"strong odor\", which was neg for nitrates, leukocytes, and WBC. Still had urine culture sent and was given 1 dose CTX. Will DC given UA bland, and patient asymtpomatic. If having fevers, or worsening mentation could consider re-testing and treating.   - Please get up slowly from a seated or laying position, avoid falls. - Avoid tobacco, alcohol and other illicit drug use. PENDING TEST RESULTS:   At the time of discharge the following test results are still pending: Urine culture - no WBC, afebrile, and no WBC in urine. UA bland.      FOLLOW UP APPOINTMENTS:    Follow-up Information     Follow up With Specialties Details Why Contact Info    Leidy Rubio MD Orthopedic Surgery Schedule an appointment as soon as possible for a visit  [place] [id number]       [facility] [address] [facility] Emergency Medicine  If symptoms worsen Nasra Colon [address]  [phone]             DIET: Resume previous diet    ACTIVITY: PT/OT Eval and Treat    WOUND CARE: None    EQUIPMENT needed: Per PT/OT       DISCHARGE MEDICATIONS:  Current Discharge Medication List      START taking these medications    Details   oxyCODONE IR (Roxicodone) 5 mg immediate release tablet Take 0.5 Tabs by mouth every six (6) hours as needed for Pain for up to 3 days. Max Daily Amount: 10 mg.  Qty: 12 Tab, Refills: 0    Associated Diagnoses: Closed fracture of multiple pubic rami, right, initial encounter ([facility]         CONTINUE these medications which have NOT CHANGED    Details   levETIRAcetam (KEPPRA) 750 mg tablet Take 1 Tab by mouth two (2) times a day. Qty: 60 Tab, Refills: 0      acetaminophen (TYLENOL) 325 mg tablet Take 2 Tabs by mouth every six (6) hours. Qty: 90 Tab, Refills: 0      calcium-vitamin D (OYSTER SHELL) 500 mg(1,250mg) -200 unit per tablet Take 1 Tab by mouth three (3) times daily (with meals). Qty: 60 Tab, Refills: 0      senna-docusate (PERICOLACE) 8.6-50 mg per tablet Take 1 Tab by mouth two (2) times a day. Qty: 30 Tab, Refills: 0      loratadine (CLARITIN) 10 mg tablet Take 10 mg by mouth daily as needed (Congestion, cough). polyethylene glycol (MIRALAX) 17 gram packet Take 1 Packet by mouth daily. Qty: 30 Each, Refills: 0      bisacodyL (DULCOLAX, BISACODYL,) 10 mg suppository Insert 10 mg into rectum daily as needed. docusate sodium (COLACE) 100 mg capsule Take 100 mg by mouth daily. NOTIFY YOUR PHYSICIAN FOR ANY OF THE FOLLOWING:   Fever over 101 degrees for 24 hours. Chest pain, shortness of breath, fever, chills, nausea, vomiting, diarrhea, change in mentation, falling, weakness, bleeding. Severe pain or pain not relieved by medications. Or, any other signs or symptoms that you may have questions about.     DISPOSITION:    Home With:   OT  PT  HH  RN      X Long term SNF/Inpatient Rehab    Independent/assisted living    Hospice    Other:       PATIENT CONDITION AT DISCHARGE:     Functional status   X Poor     Deconditioned     Independent      Cognition     Lucid     Forgetful    X Developmental delay with non verbal baseline (Cerebral palsy) Catheters/lines (plus indication)    Robledo     PICC     PEG    X None      Code status    X Full code     DNR      PHYSICAL EXAMINATION AT DISCHARGE:  Visit Vitals  /69 (BP 1 Location: Left arm, BP Patient Position: At rest)   Pulse 73   Temp 98.1 °F (36.7 °C)   Resp 16   Wt 52 kg (114 lb 10.2 oz)   SpO2 100%   BMI 22.39 kg/m²     Gen: NAD, awake in bed, non verbal baseline,  HEENT: NC/AT, sclera anicteric, PERRL, EOMI, poor dentition   CV: RRR no m/r/g, normal S1 and S2, no pedal edema   Resp: CTA b/l no increased work of breathing, no wheezing or rhonchi,   Abd: NT/ND, normal bowel sounds, no rebound or guarding  Ext: 2+ pulses, no edema, bilateral UE contractures.    Skin: No rashes or lesions      CHRONIC MEDICAL DIAGNOSES:  Problem List as of 9/10/2020 Date Reviewed: 9/10/2020          Codes Class Noted - Resolved    Seizure ([facility] ICD-10-CM: R56.9  ICD-9-CM: 780.39  9/10/2020 - Present        Hypertension ICD-10-CM: I10  ICD-9-CM: 401.9  9/10/2020 - Present        Cerebral palsy ([facility] ICD-10-CM: G80.9  ICD-9-CM: 343.9  9/10/2020 - Present        * (Principal) Closed fracture of multiple pubic rami, right, initial encounter [facility]) ICD-10-CM: Q53.701N  ICD-9-CM: 808.2  9/7/2020 - Present        Hip fracture ([facility] ICD-10-CM: P48.446I  ICD-9-CM: 820.8  1/23/2020 - Present                Signed:   Alexis Flaherty MD  9/10/2020  10:17 AM

## 2020-09-10 NOTE — PROGRESS NOTES
Problem: Pain  Goal: *Control of Pain  Outcome: Progressing Towards Goal  RN assessing patient's pain levels every 4 hours and reassessing within an hour of intervention. Problem: Falls - Risk of  Goal: *Absence of Falls  Description: Document Hallie Alvarez Fall Risk and appropriate interventions in the flowsheet.   Outcome: Progressing Towards Goal  Note: Fall Risk Interventions:  Mobility Interventions: PT Consult for mobility concerns    Mentation Interventions: Adequate sleep, hydration, pain control, Evaluate medications/consider consulting pharmacy    Medication Interventions: Evaluate medications/consider consulting pharmacy    Elimination Interventions: Call light in reach, Patient to call for help with toileting needs    History of Falls Interventions: Evaluate medications/consider consulting pharmacy

## 2022-10-27 ENCOUNTER — OFFICE VISIT (OUTPATIENT)
Dept: INTERNAL MEDICINE CLINIC | Age: 77
End: 2022-10-27
Payer: MEDICARE

## 2022-10-27 DIAGNOSIS — M24.541 CONTRACTURE OF JOINT OF RIGHT HAND: ICD-10-CM

## 2022-10-27 DIAGNOSIS — G80.9 CEREBRAL PALSY, UNSPECIFIED TYPE (HCC): ICD-10-CM

## 2022-10-27 DIAGNOSIS — I10 PRIMARY HYPERTENSION: ICD-10-CM

## 2022-10-27 DIAGNOSIS — E55.9 VITAMIN D DEFICIENCY: ICD-10-CM

## 2022-10-27 DIAGNOSIS — F03.C0 SEVERE DEMENTIA WITHOUT BEHAVIORAL DISTURBANCE, PSYCHOTIC DISTURBANCE, MOOD DISTURBANCE, OR ANXIETY, UNSPECIFIED DEMENTIA TYPE: Primary | ICD-10-CM

## 2022-10-27 DIAGNOSIS — G93.1 ANOXIC BRAIN DAMAGE (HCC): ICD-10-CM

## 2022-10-27 DIAGNOSIS — R56.9 SEIZURE (HCC): ICD-10-CM

## 2022-10-31 ENCOUNTER — OFFICE VISIT (OUTPATIENT)
Dept: INTERNAL MEDICINE CLINIC | Age: 77
End: 2022-10-31
Payer: MEDICARE

## 2022-10-31 DIAGNOSIS — G80.9 CEREBRAL PALSY, UNSPECIFIED TYPE (HCC): Primary | ICD-10-CM

## 2022-10-31 DIAGNOSIS — K59.00 CONSTIPATION, UNSPECIFIED CONSTIPATION TYPE: ICD-10-CM

## 2022-10-31 DIAGNOSIS — R56.9 SEIZURE (HCC): ICD-10-CM

## 2022-10-31 PROCEDURE — 1101F PT FALLS ASSESS-DOCD LE1/YR: CPT | Performed by: NURSE PRACTITIONER

## 2022-10-31 PROCEDURE — 99308 SBSQ NF CARE LOW MDM 20: CPT | Performed by: NURSE PRACTITIONER

## 2022-10-31 PROCEDURE — G8432 DEP SCR NOT DOC, RNG: HCPCS | Performed by: NURSE PRACTITIONER

## 2022-10-31 PROCEDURE — G8536 NO DOC ELDER MAL SCRN: HCPCS | Performed by: NURSE PRACTITIONER

## 2022-10-31 NOTE — PROGRESS NOTES
THIS IS NOT A COMPLETE MEDICAL RECORD ON THIS PATIENT. THIS IS A PATIENT AT [facility]. PLEASE CONTACT THE FACILITY LISTED FOR MORE INFORMATION ON THIS PATIENT. THE COMPLETE RECORD RESIDES WITH THIS LONG TERM CARE FACILITY. HISTORY OF PRESENT ILLNESS  Jose Miguel Luke is a 78 y.o. female. This patient is currently under the care of Dr. Deisi Mejia at [facility]. The patient's past medical history includes cerebral palsy, allergic rhinitis, hyperlipidemia, hypertension, mental retardation, and seizures. The patient is seen today for follow-up. Nursing does not report any concerns or changes in condition at this time. Blood Pressure: 126 /  74 mmHg   Temperature: 98.0 °F   Pulse: 60 bpm  Respirations: 18 Breaths/min   O2 Saturation: 95.0     HPI    Review of Systems   Unable to perform ROS: Mental acuity     Physical Exam  Constitutional:       General: She is not in acute distress. HENT:      Head: Atraumatic. Nose: No congestion. Eyes:      Conjunctiva/sclera: Conjunctivae normal.   Cardiovascular:      Rate and Rhythm: Normal rate and regular rhythm. Pulmonary:      Effort: Pulmonary effort is normal. No respiratory distress. Breath sounds: Normal breath sounds. No wheezing or rales. Abdominal:      General: Bowel sounds are normal. There is no distension. Palpations: Abdomen is soft. Tenderness: There is no abdominal tenderness. Musculoskeletal:      Right lower leg: No edema. Left lower leg: No edema. Skin:     General: Skin is warm and dry. Neurological:      Mental Status: She is alert. Comments: Non-verbal at time of visit   Psychiatric:      Comments: Smiling       ASSESSMENT and PLAN    ICD-10-CM ICD-9-CM    1. Cerebral palsy, unspecified type ([facility]  G80.9 343.9 Supportive care.   On,  Calcium 600+D Plus Minerals Oral Tablet 600-400 MG-UNIT (Calcium Carbonate-Vitamin D w/ Minerals)   Give 1 tablet by mouth two times a day for Supplement 2. Seizure ([facility] [facility]  R56.9 780.39 On,  levETIRAcetam Oral Tablet 750 MG (Levetiracetam)   Give 1 tablet by mouth two times a day       3. Constipation, unspecified constipation type  K59.00 564.00 On,  GaviLAX Powder (Polyethylene Glycol 3350)   Give 17 gram by mouth every 24 hours as needed           Reviewed medications and side effects in detail. Nursing to continue to monitor closely and notify MD/NP of any change in condition.

## 2022-12-31 PROBLEM — M24.541 CONTRACTURE OF JOINT OF RIGHT HAND: Status: ACTIVE | Noted: 2022-12-31

## 2022-12-31 PROBLEM — E55.9 VITAMIN D DEFICIENCY: Status: ACTIVE | Noted: 2022-12-31

## 2022-12-31 PROBLEM — F03.C0 SEVERE DEMENTIA WITHOUT BEHAVIORAL DISTURBANCE, PSYCHOTIC DISTURBANCE, MOOD DISTURBANCE, OR ANXIETY, UNSPECIFIED DEMENTIA TYPE: Status: ACTIVE | Noted: 2022-12-31

## 2022-12-31 NOTE — PROGRESS NOTES
THIS IS NOT A COMPLETE MEDICAL RECORD ON THIS PATIENT. THIS IS A PATIENT AT [facility]  PLEASE CONTACT THE FACILITY LISTED BELOW FOR MORE INFORMATION ON THIS PATIENT. THE COMPLETE RECORD RESIDES WITH THIS LONG TERM CARE FACILITY    Robert Jorgensen is a 53-year-old lady who was admitted to [facility] for respite. Followed by [facility]. She is a poor historian with dementia. I reviewed records that has come in with patient. PMH includes dementia, seizures, DJD, cerebral palsy, HTN, vitamin D deficiency, Hx of atoxic brain damage, hand contractures, history of right hip fracture    NKA. No tobacco or alcohol use. NH med list reviewed including Keppra, MiraLAX, calcium plus D    Depends on staff for most ADLs. Staff do not report any acute issues. Exam: Vital signs are stable. Afebrile. Frail lady. In bed. NAD. Not saying much. HEENT with bitemporal wasting. Neck is supple without JVD or bruits  Heart is regular with distant sounds  Lungs with diminished sounds but mostly clear  Abdomen soft and flat. Nontender  Extremities no significant edema. DJD changes with decreased ROM. Bilateral hand contractures. Neuro: Generalized weakness with muscle atrophy  Skin is intact. Warm and dry. Impression:  Dementia  History of atoxic brain injury  Seizures  Cerebral palsy  HTN  DJD with hand contractures  History of right hip fracture  Bedbound    Plan:  Continue current meds  Fall precautions  Skin care  Overall seems stable but gradual decline is expected  Full code    I personally spent 45 minutes providing the above care inclusive of: preparation, chart review, charting, examining and counseling patient, and coordinating care. Over 50% of this time was spent in counseling and coordination of care.     Lata Lei D.O.

## 2023-01-16 ENCOUNTER — OFFICE VISIT (OUTPATIENT)
Dept: INTERNAL MEDICINE CLINIC | Age: 78
End: 2023-01-16
Payer: MEDICARE

## 2023-01-16 DIAGNOSIS — F72 SEVERE INTELLECTUAL DISABILITY: ICD-10-CM

## 2023-01-16 DIAGNOSIS — R56.9 SEIZURE (HCC): ICD-10-CM

## 2023-01-16 DIAGNOSIS — E55.9 VITAMIN D DEFICIENCY: ICD-10-CM

## 2023-01-16 DIAGNOSIS — G80.9 CEREBRAL PALSY, UNSPECIFIED TYPE (HCC): Primary | ICD-10-CM

## 2023-01-16 DIAGNOSIS — G93.1 ANOXIC BRAIN DAMAGE (HCC): ICD-10-CM

## 2023-01-16 PROCEDURE — 99306 1ST NF CARE HIGH MDM 50: CPT | Performed by: INTERNAL MEDICINE

## 2023-01-16 PROCEDURE — G8432 DEP SCR NOT DOC, RNG: HCPCS | Performed by: INTERNAL MEDICINE

## 2023-01-16 PROCEDURE — 1101F PT FALLS ASSESS-DOCD LE1/YR: CPT | Performed by: INTERNAL MEDICINE

## 2023-01-16 PROCEDURE — G8536 NO DOC ELDER MAL SCRN: HCPCS | Performed by: INTERNAL MEDICINE

## 2023-01-20 PROBLEM — F72 SEVERE INTELLECTUAL DISABILITY: Status: ACTIVE | Noted: 2023-01-20

## 2023-01-20 PROBLEM — G93.1 ANOXIC BRAIN DAMAGE (HCC): Status: ACTIVE | Noted: 2023-01-20

## 2023-01-20 NOTE — PROGRESS NOTES
THIS IS NOT A COMPLETE MEDICAL RECORD ON THIS PATIENT. THIS IS A PATIENT AT [facility]  PLEASE CONTACT THE FACILITY LISTED BELOW FOR MORE INFORMATION ON THIS PATIENT. THE COMPLETE RECORD RESIDES WITH THIS LONG TERM CARE FACILITY    Nursing home H&P    Karina Nation is a 72-year-old lady who was admitted to [facility] rehab for week respite. She is followed by [facility].  I reviewed the records that she has come in with also reviewed EMR records. She has significant intellectual disability mostly nonverbal.  Not compliant with exam either. Staff do not report any significant acute issues at this point. PMH: Cerebral palsy, intellectual disability, history of cerebral anoxic injury, gait instability, urine incontinence, DJD, vitamin D deficiency, seizure disorder  Allergies: NKA  Medications: See NH list including Keppra, Tylenol, calcium plus D, MiraLAX, Kaur-Colace, Claritin  SH: Lives with family. No tobacco or alcohol use. Labs: Most recently done in 2020 and look stable. Exam: Vital signs stable. Afebrile. Lying in bed. NAD. Nonverbal.  Easily agitated when I touch her. HEENT: Oral mucosa seems moist  Neck: Supple  Heart: Regular  Lungs: Clear  Abdomen: Soft and flat  Extremities: No significant edema  Neuro: Moves all extremities with some rigidity  Skin: Intact. Warm and dry. Impression:  Cerebral palsy  Intellectual disability, severe  History of cerebral anoxic injury  Gait instability  Urine incontinence  Vitamin D deficiency  Seizure disorder    Plan:  Continue current meds  Fall precautions  Overall seems stable    I personally spent 45 minutes providing the above care inclusive of: preparation, chart review, charting, examining and counseling patient, and coordinating care. Over 50% of this time was spent in counseling and coordination of care.     Donavan Negro D.O.

## (undated) DEVICE — SUTURE VCRL SZ 2 L54IN ABSRB UD L65MM TP-1 1/2 CIR J880T

## (undated) DEVICE — SOLUTION IRRIG 3000ML 0.9% SOD CHL FLX CONT 0797208] ICU MEDICAL INC]

## (undated) DEVICE — SUTURE ABSORBABLE BRAIDED 2-0 CT-1 27 IN UD VICRYL J259H

## (undated) DEVICE — Device

## (undated) DEVICE — STERILE POLYISOPRENE POWDER-FREE SURGICAL GLOVES WITH EMOLLIENT COATING: Brand: PROTEXIS

## (undated) DEVICE — SCRUB DRY SURG EZ SCRUB BRUSH PREOPERATIVE GRN

## (undated) DEVICE — SOLUTION IRRIG 1000ML H2O STRL BLT

## (undated) DEVICE — PREP SKN PREVAIL 40ML APPL --

## (undated) DEVICE — SYR 20ML LL STRL LF --

## (undated) DEVICE — SUTURE VCRL SZ 2-0 L27IN ABSRB UD L26MM SH 1/2 CIR J417H

## (undated) DEVICE — DRAPE XR C ARM 41X74IN LF --

## (undated) DEVICE — BLADE SAW W073XL276IN THK0031IN CUT THK0036IN REPL SAG

## (undated) DEVICE — HANDPIECE SET WITH BONE CLEANING TIP AND SUCTION TUBE: Brand: INTERPULSE

## (undated) DEVICE — SOLUTION IV 50ML 0.9% SOD CHL

## (undated) DEVICE — INFECTION CONTROL KIT SYS

## (undated) DEVICE — 4-PORT MANIFOLD: Brand: NEPTUNE 2

## (undated) DEVICE — PADDING CAST SPEC 6INX4YD COT --

## (undated) DEVICE — STERILE POLYISOPRENE POWDER-FREE SURGICAL GLOVES: Brand: PROTEXIS

## (undated) DEVICE — 6619 2 PTNT ISO SYS INCISE AREA&LT;(&GT;&&LT;)&GT;P: Brand: STERI-DRAPE™ IOBAN™ 2

## (undated) DEVICE — SUTURE MCRYL SZ 4 0 L18IN ABSRB VLT PS 1 L24MM 3 8 CIR REV Y682H

## (undated) DEVICE — DRAPE,U/ SHT,SPLIT,PLAS,STERIL: Brand: MEDLINE

## (undated) DEVICE — T4 HOOD

## (undated) DEVICE — SUTURE MCRYL SZ 2-0 L36IN ABSRB UD L36MM CT-1 1/2 CIR Y945H

## (undated) DEVICE — REM POLYHESIVE ADULT PATIENT RETURN ELECTRODE: Brand: VALLEYLAB

## (undated) DEVICE — NEEDLE HYPO 18GA L1.5IN PNK S STL HUB POLYPR SHLD REG BVL